# Patient Record
Sex: MALE | Race: WHITE | NOT HISPANIC OR LATINO | URBAN - METROPOLITAN AREA
[De-identification: names, ages, dates, MRNs, and addresses within clinical notes are randomized per-mention and may not be internally consistent; named-entity substitution may affect disease eponyms.]

---

## 2017-07-26 ENCOUNTER — OUTPATIENT (OUTPATIENT)
Dept: OUTPATIENT SERVICES | Facility: HOSPITAL | Age: 77
LOS: 1 days | Discharge: HOME | End: 2017-07-26
Payer: COMMERCIAL

## 2017-07-26 VITALS
DIASTOLIC BLOOD PRESSURE: 72 MMHG | HEIGHT: 67 IN | HEART RATE: 91 BPM | TEMPERATURE: 98 F | WEIGHT: 169.98 LBS | OXYGEN SATURATION: 100 % | SYSTOLIC BLOOD PRESSURE: 145 MMHG | RESPIRATION RATE: 14 BRPM

## 2017-07-26 DIAGNOSIS — Z96.7 PRESENCE OF OTHER BONE AND TENDON IMPLANTS: Chronic | ICD-10-CM

## 2017-07-26 DIAGNOSIS — Z90.89 ACQUIRED ABSENCE OF OTHER ORGANS: Chronic | ICD-10-CM

## 2017-07-26 DIAGNOSIS — Z98.890 OTHER SPECIFIED POSTPROCEDURAL STATES: Chronic | ICD-10-CM

## 2017-07-26 DIAGNOSIS — Z12.11 ENCOUNTER FOR SCREENING FOR MALIGNANT NEOPLASM OF COLON: ICD-10-CM

## 2017-07-26 DIAGNOSIS — Z98.49 CATARACT EXTRACTION STATUS, UNSPECIFIED EYE: Chronic | ICD-10-CM

## 2017-07-26 LAB
ANION GAP SERPL CALC-SCNC: 8 MMOL/L — SIGNIFICANT CHANGE UP (ref 5–17)
APTT BLD: 28.8 SEC — SIGNIFICANT CHANGE UP (ref 27.5–37.4)
BASOPHILS # BLD AUTO: 0.1 K/UL — SIGNIFICANT CHANGE UP (ref 0–0.2)
BASOPHILS NFR BLD AUTO: 1.1 % — SIGNIFICANT CHANGE UP (ref 0–2)
BUN SERPL-MCNC: 11 MG/DL — SIGNIFICANT CHANGE UP (ref 7–23)
CALCIUM SERPL-MCNC: 9.4 MG/DL — SIGNIFICANT CHANGE UP (ref 8.5–10.1)
CHLORIDE SERPL-SCNC: 103 MMOL/L — SIGNIFICANT CHANGE UP (ref 96–108)
CO2 SERPL-SCNC: 27 MMOL/L — SIGNIFICANT CHANGE UP (ref 22–31)
CREAT SERPL-MCNC: 0.8 MG/DL — SIGNIFICANT CHANGE UP (ref 0.5–1.3)
EOSINOPHIL # BLD AUTO: 0.2 K/UL — SIGNIFICANT CHANGE UP (ref 0–0.5)
EOSINOPHIL NFR BLD AUTO: 4.4 % — SIGNIFICANT CHANGE UP (ref 0–6)
GLUCOSE SERPL-MCNC: 114 MG/DL — HIGH (ref 70–99)
HCT VFR BLD CALC: 43.5 % — SIGNIFICANT CHANGE UP (ref 39–50)
HGB BLD-MCNC: 15 G/DL — SIGNIFICANT CHANGE UP (ref 13–17)
INR BLD: 1.18 RATIO — HIGH (ref 0.88–1.16)
LYMPHOCYTES # BLD AUTO: 1.6 K/UL — SIGNIFICANT CHANGE UP (ref 1–3.3)
LYMPHOCYTES # BLD AUTO: 28.1 % — SIGNIFICANT CHANGE UP (ref 13–44)
MCHC RBC-ENTMCNC: 34.4 PG — HIGH (ref 27–34)
MCHC RBC-ENTMCNC: 34.6 GM/DL — SIGNIFICANT CHANGE UP (ref 32–36)
MCV RBC AUTO: 99.4 FL — SIGNIFICANT CHANGE UP (ref 80–100)
MONOCYTES # BLD AUTO: 0.6 K/UL — SIGNIFICANT CHANGE UP (ref 0–0.9)
MONOCYTES NFR BLD AUTO: 10.8 % — SIGNIFICANT CHANGE UP (ref 2–14)
NEUTROPHILS # BLD AUTO: 3.1 K/UL — SIGNIFICANT CHANGE UP (ref 1.8–7.4)
NEUTROPHILS NFR BLD AUTO: 55.5 % — SIGNIFICANT CHANGE UP (ref 43–77)
PLATELET # BLD AUTO: 293 K/UL — SIGNIFICANT CHANGE UP (ref 150–400)
POTASSIUM SERPL-MCNC: 4.2 MMOL/L — SIGNIFICANT CHANGE UP (ref 3.5–5.3)
POTASSIUM SERPL-SCNC: 4.2 MMOL/L — SIGNIFICANT CHANGE UP (ref 3.5–5.3)
PROTHROM AB SERPL-ACNC: 12.8 SEC — HIGH (ref 9.8–12.7)
RBC # BLD: 4.37 M/UL — SIGNIFICANT CHANGE UP (ref 4.2–5.8)
RBC # FLD: 11.7 % — SIGNIFICANT CHANGE UP (ref 10.3–14.5)
SODIUM SERPL-SCNC: 138 MMOL/L — SIGNIFICANT CHANGE UP (ref 135–145)
WBC # BLD: 5.6 K/UL — SIGNIFICANT CHANGE UP (ref 3.8–10.5)
WBC # FLD AUTO: 5.6 K/UL — SIGNIFICANT CHANGE UP (ref 3.8–10.5)

## 2017-07-26 PROCEDURE — 93010 ELECTROCARDIOGRAM REPORT: CPT

## 2017-07-26 NOTE — H&P PST ADULT - PMH
Arrhythmia    Diverticulosis    Essential hypertension    Inverted T wave    Pure hypercholesterolemia

## 2017-07-26 NOTE — H&P PST ADULT - PSH
History of flexible sigmoidoscopy    History of right inguinal hernia repair    S/P cataract surgery  b/l 2010  S/P ORIF (open reduction internal fixation) fracture  left 1995  S/P tonsillectomy  as a child

## 2017-07-26 NOTE — H&P PST ADULT - ASSESSMENT
75 yo male scheduled for colonoscopy with Dr. Salvador on 8/2/17    1. Labs as per surgeon  2. EKG  3. Pre-procedure instructions as per surgeon

## 2017-07-26 NOTE — H&P PST ADULT - HISTORY OF PRESENT ILLNESS
75 yo male presents to [facility] prior to proposed procedure. Reports h/o diverticulosis.  Had sigmoidoscopy 3 years ago & is coming in for routine colonoscopy screening. Denies abddominal pain or blood in stool.  Now for said procedure. 75 yo male presents to [facility] prior to proposed procedure. Reports h/o diverticulosis.  Had sigmoidoscopy 3 years ago & is coming in for routine colonoscopy screening. Denies abdominal pain or blood in stool.  Now for said procedure.

## 2017-08-02 ENCOUNTER — RESULT REVIEW (OUTPATIENT)
Age: 77
End: 2017-08-02

## 2017-08-02 ENCOUNTER — OUTPATIENT (OUTPATIENT)
Dept: OUTPATIENT SERVICES | Facility: HOSPITAL | Age: 77
LOS: 1 days | Discharge: HOME | End: 2017-08-02
Payer: COMMERCIAL

## 2017-08-02 VITALS
WEIGHT: 169.98 LBS | OXYGEN SATURATION: 100 % | HEART RATE: 91 BPM | RESPIRATION RATE: 29 BRPM | HEIGHT: 67 IN | DIASTOLIC BLOOD PRESSURE: 87 MMHG | TEMPERATURE: 99 F | SYSTOLIC BLOOD PRESSURE: 168 MMHG

## 2017-08-02 DIAGNOSIS — Z98.890 OTHER SPECIFIED POSTPROCEDURAL STATES: Chronic | ICD-10-CM

## 2017-08-02 DIAGNOSIS — Z98.49 CATARACT EXTRACTION STATUS, UNSPECIFIED EYE: Chronic | ICD-10-CM

## 2017-08-02 DIAGNOSIS — Z96.7 PRESENCE OF OTHER BONE AND TENDON IMPLANTS: Chronic | ICD-10-CM

## 2017-08-02 DIAGNOSIS — Z90.89 ACQUIRED ABSENCE OF OTHER ORGANS: Chronic | ICD-10-CM

## 2017-08-02 PROCEDURE — 88305 TISSUE EXAM BY PATHOLOGIST: CPT | Mod: 26

## 2017-08-02 RX ORDER — SODIUM CHLORIDE 9 MG/ML
1000 INJECTION INTRAMUSCULAR; INTRAVENOUS; SUBCUTANEOUS
Qty: 0 | Refills: 0 | Status: DISCONTINUED | OUTPATIENT
Start: 2017-08-02 | End: 2017-08-17

## 2017-08-02 RX ADMIN — SODIUM CHLORIDE 75 MILLILITER(S): 9 INJECTION INTRAMUSCULAR; INTRAVENOUS; SUBCUTANEOUS at 08:37

## 2017-08-03 LAB — SURGICAL PATHOLOGY FINAL REPORT - CH: SIGNIFICANT CHANGE UP

## 2017-08-06 DIAGNOSIS — E78.5 HYPERLIPIDEMIA, UNSPECIFIED: ICD-10-CM

## 2017-08-06 DIAGNOSIS — Z87.891 PERSONAL HISTORY OF NICOTINE DEPENDENCE: ICD-10-CM

## 2017-08-06 DIAGNOSIS — Z12.11 ENCOUNTER FOR SCREENING FOR MALIGNANT NEOPLASM OF COLON: ICD-10-CM

## 2017-08-06 DIAGNOSIS — K57.30 DIVERTICULOSIS OF LARGE INTESTINE WITHOUT PERFORATION OR ABSCESS WITHOUT BLEEDING: ICD-10-CM

## 2017-08-06 DIAGNOSIS — I10 ESSENTIAL (PRIMARY) HYPERTENSION: ICD-10-CM

## 2017-08-06 DIAGNOSIS — K64.8 OTHER HEMORRHOIDS: ICD-10-CM

## 2017-08-06 DIAGNOSIS — D12.0 BENIGN NEOPLASM OF CECUM: ICD-10-CM

## 2019-11-19 ENCOUNTER — APPOINTMENT (OUTPATIENT)
Dept: ULTRASOUND IMAGING | Facility: CLINIC | Age: 79
End: 2019-11-19
Payer: MEDICARE

## 2019-11-19 ENCOUNTER — TRANSCRIPTION ENCOUNTER (OUTPATIENT)
Age: 79
End: 2019-11-19

## 2019-11-19 ENCOUNTER — OUTPATIENT (OUTPATIENT)
Dept: OUTPATIENT SERVICES | Facility: HOSPITAL | Age: 79
LOS: 1 days | End: 2019-11-19
Payer: MEDICARE

## 2019-11-19 DIAGNOSIS — Z98.890 OTHER SPECIFIED POSTPROCEDURAL STATES: Chronic | ICD-10-CM

## 2019-11-19 DIAGNOSIS — Z90.89 ACQUIRED ABSENCE OF OTHER ORGANS: Chronic | ICD-10-CM

## 2019-11-19 DIAGNOSIS — Z98.49 CATARACT EXTRACTION STATUS, UNSPECIFIED EYE: Chronic | ICD-10-CM

## 2019-11-19 DIAGNOSIS — Z00.8 ENCOUNTER FOR OTHER GENERAL EXAMINATION: ICD-10-CM

## 2019-11-19 DIAGNOSIS — Z96.7 PRESENCE OF OTHER BONE AND TENDON IMPLANTS: Chronic | ICD-10-CM

## 2019-11-19 PROBLEM — I49.9 CARDIAC ARRHYTHMIA, UNSPECIFIED: Chronic | Status: ACTIVE | Noted: 2017-07-26

## 2019-11-19 PROBLEM — I10 ESSENTIAL (PRIMARY) HYPERTENSION: Chronic | Status: ACTIVE | Noted: 2017-07-26

## 2019-11-19 PROBLEM — R94.31 ABNORMAL ELECTROCARDIOGRAM [ECG] [EKG]: Chronic | Status: ACTIVE | Noted: 2017-07-26

## 2019-11-19 PROBLEM — E78.00 PURE HYPERCHOLESTEROLEMIA, UNSPECIFIED: Chronic | Status: ACTIVE | Noted: 2017-07-26

## 2019-11-19 PROBLEM — K57.90 DIVERTICULOSIS OF INTESTINE, PART UNSPECIFIED, WITHOUT PERFORATION OR ABSCESS WITHOUT BLEEDING: Chronic | Status: ACTIVE | Noted: 2017-07-26

## 2019-11-19 PROCEDURE — 93880 EXTRACRANIAL BILAT STUDY: CPT

## 2019-11-19 PROCEDURE — 93880 EXTRACRANIAL BILAT STUDY: CPT | Mod: 26

## 2020-02-27 ENCOUNTER — OUTPATIENT (OUTPATIENT)
Dept: OUTPATIENT SERVICES | Facility: HOSPITAL | Age: 80
LOS: 1 days | End: 2020-02-27
Payer: MEDICARE

## 2020-02-27 ENCOUNTER — APPOINTMENT (OUTPATIENT)
Dept: RADIOLOGY | Facility: CLINIC | Age: 80
End: 2020-02-27

## 2020-02-27 DIAGNOSIS — Z98.890 OTHER SPECIFIED POSTPROCEDURAL STATES: Chronic | ICD-10-CM

## 2020-02-27 DIAGNOSIS — Z96.7 PRESENCE OF OTHER BONE AND TENDON IMPLANTS: Chronic | ICD-10-CM

## 2020-02-27 DIAGNOSIS — Z00.8 ENCOUNTER FOR OTHER GENERAL EXAMINATION: ICD-10-CM

## 2020-02-27 DIAGNOSIS — Z98.49 CATARACT EXTRACTION STATUS, UNSPECIFIED EYE: Chronic | ICD-10-CM

## 2020-02-27 DIAGNOSIS — Z90.89 ACQUIRED ABSENCE OF OTHER ORGANS: Chronic | ICD-10-CM

## 2020-02-27 PROCEDURE — 73030 X-RAY EXAM OF SHOULDER: CPT | Mod: 26,RT

## 2020-02-27 PROCEDURE — 73030 X-RAY EXAM OF SHOULDER: CPT

## 2020-03-17 ENCOUNTER — OUTPATIENT (OUTPATIENT)
Dept: OUTPATIENT SERVICES | Facility: HOSPITAL | Age: 80
LOS: 1 days | End: 2020-03-17
Payer: MEDICARE

## 2020-03-17 ENCOUNTER — APPOINTMENT (OUTPATIENT)
Dept: MRI IMAGING | Facility: CLINIC | Age: 80
End: 2020-03-17
Payer: MEDICARE

## 2020-03-17 DIAGNOSIS — Z98.49 CATARACT EXTRACTION STATUS, UNSPECIFIED EYE: Chronic | ICD-10-CM

## 2020-03-17 DIAGNOSIS — Z96.7 PRESENCE OF OTHER BONE AND TENDON IMPLANTS: Chronic | ICD-10-CM

## 2020-03-17 DIAGNOSIS — Z98.890 OTHER SPECIFIED POSTPROCEDURAL STATES: Chronic | ICD-10-CM

## 2020-03-17 DIAGNOSIS — Z00.8 ENCOUNTER FOR OTHER GENERAL EXAMINATION: ICD-10-CM

## 2020-03-17 DIAGNOSIS — Z90.89 ACQUIRED ABSENCE OF OTHER ORGANS: Chronic | ICD-10-CM

## 2020-03-17 PROCEDURE — 73221 MRI JOINT UPR EXTREM W/O DYE: CPT

## 2020-03-17 PROCEDURE — 73221 MRI JOINT UPR EXTREM W/O DYE: CPT | Mod: 26,RT

## 2020-03-23 ENCOUNTER — APPOINTMENT (OUTPATIENT)
Dept: ORTHOPEDIC SURGERY | Facility: CLINIC | Age: 80
End: 2020-03-23

## 2020-04-15 ENCOUNTER — APPOINTMENT (OUTPATIENT)
Dept: ORTHOPEDIC SURGERY | Facility: CLINIC | Age: 80
End: 2020-04-15
Payer: MEDICARE

## 2020-04-15 DIAGNOSIS — M25.511 PAIN IN RIGHT SHOULDER: ICD-10-CM

## 2020-04-15 DIAGNOSIS — M75.101 UNSPECIFIED ROTATOR CUFF TEAR OR RUPTURE OF RIGHT SHOULDER, NOT SPECIFIED AS TRAUMATIC: ICD-10-CM

## 2020-04-15 DIAGNOSIS — G89.29 PAIN IN RIGHT SHOULDER: ICD-10-CM

## 2020-04-15 PROCEDURE — 99204 OFFICE O/P NEW MOD 45 MIN: CPT | Mod: 25

## 2020-04-15 PROCEDURE — 20610 DRAIN/INJ JOINT/BURSA W/O US: CPT | Mod: RT

## 2020-04-15 NOTE — PROCEDURE
[de-identified] : Laterality: Right shoulder Glenohumeral Joint Injection:\par \par The risks and benefits of the injection were reviewed with the patient today in office and all their questions were answered.  The injection site was the posterolateral corner of the right shoulder.  Prior to giving the injection the injection site was prepped with Chloraprep and a sterile field was created.  Sterile technique was carried out throughout the procedure.  After verbal consent from the patient we went ahead and injected the Right shoulder today placed in the Glenohumeral Joint with 1 ml 40 mg Depo-Medrol, 5 ml 1% lidocaine and 4 ml of .50% Bupivacaine for a total of 10 ml with a 22 lucia 1.5" needle.  The medication was placed into the shoulder joint without complication.  Post injection the patient reported no pain and normal motion of the shoulder.  The patient denied numbness and tingling going down their arm.  There were no complications during the procedure.\par \par

## 2020-04-15 NOTE — PHYSICAL EXAM
[de-identified] : Laterality: Right shoulder\par \par General: Alert and oriented x3.  In no acute distress.  Pleasant in nature with a normal affect.  No apparent respiratory distress. \par Erythema, Warmth, Rubor: Negative\par Swelling: Negative\par \par ROM:\par FF: 150° degrees\par ER: 70° degrees\par Abduction: 120° degrees\par IR: Normal\par IR behind back: L5\par \par Special Test:\par Empty Can Sign: Positive\par [place]'s Sign: Positive\par Mederos/Neer Sign: Negative\par Speed's Sign: Positive\par Yergason's Sign: Negative\par Apprehension/Relocation: Negative\par Sulcus Sign: Negative\par Cross Arm Adduction/Pain over AC-J: Negative\par Belly Press/Lift Off Behind Back: Negative\par \par Neurovascularly intact motor and sensory\par  [de-identified] : MRI of the right shoulder taken on 3/17/2020 and reviewed with the patient: Full thickness full with retracted supraspinatus tear mild supraspinatus muscle atrophy.  Severe tendinosis with partial thickness interstitial tearing of the anterior fibers of infraspinatus. Partial tearing of the cranial fibers of subscapularis. Partial thickness tearing of the intra-articular biceps tendon. Mild glenohumeral arthrosis. Small moderate glenoid humeral joint effusion decompressing into the subacromial/subdeltoid bursa. Nonspecific moderate teres minor atrophy.

## 2020-04-15 NOTE — DISCUSSION/SUMMARY
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 Continue with home exercise program/stretching program he does shoulder motion intact.\par #2. Avoid heavy lifting overhead.\par #3. Meloxicam 15 mg prescribed. Use as directed with food for pain.\par #4. All of his questions were answered. Surgical intervention was discussed briefly. At this point in time we are going to continue with conservative management and injection that was given today should help him.\par #5. Followup in 2-3 months as needed.

## 2020-04-15 NOTE — HISTORY OF PRESENT ILLNESS
[de-identified] : Branden is a 79-year-old male who presents with chronic right shoulder pain. He does have MRI which does show pathology of the rotator cuff as well as arthritis. His pain scale in office is a 4/10. He does have pain with trying to use the shoulder as well as night pain. He denies numbness and tingling going down his right upper cavity. He has no other complaints in office.

## 2020-05-13 ENCOUNTER — APPOINTMENT (OUTPATIENT)
Dept: ORTHOPEDIC SURGERY | Facility: CLINIC | Age: 80
End: 2020-05-13
Payer: MEDICARE

## 2020-05-13 DIAGNOSIS — M12.811 OTHER SPECIFIC ARTHROPATHIES, NOT ELSEWHERE CLASSIFIED, RIGHT SHOULDER: ICD-10-CM

## 2020-05-13 PROCEDURE — 99212 OFFICE O/P EST SF 10 MIN: CPT

## 2020-05-13 NOTE — PHYSICAL EXAM
[de-identified] : Laterality: Right shoulder\par \par General: Alert and oriented x3.  In no acute distress.  Pleasant in nature with a normal affect.  No apparent respiratory distress. \par Erythema, Warmth, Rubor: Negative\par Swelling: Negative\par \par ROM:\par FF: 150° degrees\par ER: 70° degrees\par Abduction: 120° degrees\par IR: Normal\par IR behind back: L5\par \par Special Test:\par Empty Can Sign: Positive\par [place]'s Sign: Positive\par Mederos/Neer Sign: Negative\par Speed's Sign: Positive\par Yergason's Sign: Negative\par Apprehension/Relocation: Negative\par Sulcus Sign: Negative\par Cross Arm Adduction/Pain over AC-J: Negative\par Belly Press/Lift Off Behind Back: Negative\par \par Neurovascularly intact motor and sensory\par

## 2020-05-13 NOTE — HISTORY OF PRESENT ILLNESS
[de-identified] : 04/15/2020: Branden is a 79-year-old male who presents with chronic right shoulder pain. He does have MRI which does show pathology of the rotator cuff as well as arthritis. His pain scale in office is a 4/10. He does have pain with trying to use the shoulder as well as night pain. He denies numbness and tingling going down his right upper cavity. He has no other complaints in office.\par \par 5/13/20: patient returns today for followup of his right shoulder pain and weakness.He did have some improvement with the cortisone injection and currentlyhis pain is tolerable. His main complaint is activity related weakness and dysfunction of the armspecifically with internal rotation.

## 2020-05-13 NOTE — REASON FOR VISIT
[Follow-Up Visit] : a follow-up visit for [Initial Visit] : an initial visit for [FreeTextEntry2] : Chronic right shoulder pain

## 2020-05-13 NOTE — ASSESSMENT
[FreeTextEntry1] : 79-year-old male with right shoulderpain and weakness secondary to rotator cuff arthropathy.Overall he is functioning well and has minimal pain. I recommend continued physical therapy for focused strengthening exercises and activity modification. He will followup as needed for a cortisone injection if his pain increases.

## 2020-06-08 ENCOUNTER — RX RENEWAL (OUTPATIENT)
Age: 80
End: 2020-06-08

## 2020-12-08 ENCOUNTER — TRANSCRIPTION ENCOUNTER (OUTPATIENT)
Age: 80
End: 2020-12-08

## 2021-04-10 ENCOUNTER — TRANSCRIPTION ENCOUNTER (OUTPATIENT)
Age: 81
End: 2021-04-10

## 2021-05-19 ENCOUNTER — RX RENEWAL (OUTPATIENT)
Age: 81
End: 2021-05-19

## 2021-05-20 DIAGNOSIS — R94.31 ABNORMAL ELECTROCARDIOGRAM [ECG] [EKG]: ICD-10-CM

## 2021-05-20 DIAGNOSIS — Z86.19 PERSONAL HISTORY OF OTHER INFECTIOUS AND PARASITIC DISEASES: ICD-10-CM

## 2021-05-20 DIAGNOSIS — Z80.9 FAMILY HISTORY OF MALIGNANT NEOPLASM, UNSPECIFIED: ICD-10-CM

## 2021-05-20 DIAGNOSIS — Z12.11 ENCOUNTER FOR SCREENING FOR MALIGNANT NEOPLASM OF COLON: ICD-10-CM

## 2021-05-20 DIAGNOSIS — Z78.9 OTHER SPECIFIED HEALTH STATUS: ICD-10-CM

## 2021-05-20 DIAGNOSIS — I49.9 CARDIAC ARRHYTHMIA, UNSPECIFIED: ICD-10-CM

## 2021-05-24 ENCOUNTER — NON-APPOINTMENT (OUTPATIENT)
Age: 81
End: 2021-05-24

## 2021-05-24 DIAGNOSIS — F51.09 OTHER INSOMNIA NOT DUE TO A SUBSTANCE OR KNOWN PHYSIOLOGICAL CONDITION: ICD-10-CM

## 2021-05-24 DIAGNOSIS — R73.01 IMPAIRED FASTING GLUCOSE: ICD-10-CM

## 2021-05-24 DIAGNOSIS — Z86.79 PERSONAL HISTORY OF OTHER DISEASES OF THE CIRCULATORY SYSTEM: ICD-10-CM

## 2021-05-24 DIAGNOSIS — Z87.898 PERSONAL HISTORY OF OTHER SPECIFIED CONDITIONS: ICD-10-CM

## 2021-07-08 ENCOUNTER — APPOINTMENT (OUTPATIENT)
Dept: FAMILY MEDICINE | Facility: CLINIC | Age: 81
End: 2021-07-08

## 2021-08-16 ENCOUNTER — RX RENEWAL (OUTPATIENT)
Age: 81
End: 2021-08-16

## 2021-09-01 ENCOUNTER — RX RENEWAL (OUTPATIENT)
Age: 81
End: 2021-09-01

## 2021-09-24 ENCOUNTER — RX RENEWAL (OUTPATIENT)
Age: 81
End: 2021-09-24

## 2021-12-23 ENCOUNTER — RX RENEWAL (OUTPATIENT)
Age: 81
End: 2021-12-23

## 2022-01-07 PROBLEM — Z86.39 HISTORY OF HYPERLIPIDEMIA: Status: RESOLVED | Noted: 2021-05-20 | Resolved: 2022-01-07

## 2022-01-11 ENCOUNTER — APPOINTMENT (OUTPATIENT)
Dept: FAMILY MEDICINE | Facility: CLINIC | Age: 82
End: 2022-01-11
Payer: MEDICARE

## 2022-01-11 ENCOUNTER — NON-APPOINTMENT (OUTPATIENT)
Age: 82
End: 2022-01-11

## 2022-01-11 VITALS
HEIGHT: 67 IN | OXYGEN SATURATION: 97 % | HEART RATE: 73 BPM | SYSTOLIC BLOOD PRESSURE: 142 MMHG | WEIGHT: 177 LBS | BODY MASS INDEX: 27.78 KG/M2 | DIASTOLIC BLOOD PRESSURE: 70 MMHG | TEMPERATURE: 96 F

## 2022-01-11 DIAGNOSIS — Z23 ENCOUNTER FOR IMMUNIZATION: ICD-10-CM

## 2022-01-11 DIAGNOSIS — Z86.39 PERSONAL HISTORY OF OTHER ENDOCRINE, NUTRITIONAL AND METABOLIC DISEASE: ICD-10-CM

## 2022-01-11 PROCEDURE — G0008: CPT

## 2022-01-11 PROCEDURE — 90686 IIV4 VACC NO PRSV 0.5 ML IM: CPT

## 2022-01-11 PROCEDURE — G0438: CPT

## 2022-01-11 PROCEDURE — 93000 ELECTROCARDIOGRAM COMPLETE: CPT

## 2022-01-11 PROCEDURE — 36415 COLL VENOUS BLD VENIPUNCTURE: CPT

## 2022-01-11 NOTE — HISTORY OF PRESENT ILLNESS
[FreeTextEntry1] : OSKAR IBARRA is a 81 year old male here for a physical exam.  [de-identified] : His last PE was 1/2021\par His last tetanus shot was 3/2015\par He has had Prevnar and Pneumovax \par He has had Shingrix \par He has had the COVID vaccine\par His last dentist visit was less than 6 months ago \par His last eye doctor appointment was less than one year ago \par His last dermatologist visit was a few years ago\par His diet is healthy overall\par Exercise: walking and weights\par His last colonoscopy was 8/2/17

## 2022-01-11 NOTE — PLAN
[FreeTextEntry1] : Reviewed age-appropriate preventive screening tests with patient. He is due for a flu shot. Preventive testing is no longer indicated based upon his age. \par \par Discussed clean eating (e.g. Mediterranean style diet) and recommendations for regular exercise/staying as physically active as possible.\par \par Reviewed importance of good self care (e.g. meditation, yoga, adequate rest, regular exercise, magnesium, clean eating, etc.).

## 2022-01-11 NOTE — ASSESSMENT
[FreeTextEntry1] : OSKAR IBARRA is a 81 year old male here for a physical exam. He is here to follow up on the above listed conditions. He has been compliant with medications, diet, and exercise. He is here today to repeat his labs. He requests a HgbA1c done with his labs.\par \par His insomnia has been worse recently. He will try increasing his Trazodone dose from 150 to 200 mg. If this is effective he will call for a new prescription. \par \par Today's EKG is unchanged from previous.

## 2022-01-12 ENCOUNTER — NON-APPOINTMENT (OUTPATIENT)
Age: 82
End: 2022-01-12

## 2022-01-12 LAB
ALBUMIN SERPL ELPH-MCNC: 4.4 G/DL
ALP BLD-CCNC: 37 U/L
ALT SERPL-CCNC: 22 U/L
ANION GAP SERPL CALC-SCNC: 12 MMOL/L
AST SERPL-CCNC: 19 U/L
BILIRUB SERPL-MCNC: 0.7 MG/DL
BUN SERPL-MCNC: 16 MG/DL
CALCIUM SERPL-MCNC: 9.6 MG/DL
CHLORIDE SERPL-SCNC: 104 MMOL/L
CHOLEST SERPL-MCNC: 147 MG/DL
CO2 SERPL-SCNC: 23 MMOL/L
CREAT SERPL-MCNC: 0.89 MG/DL
ESTIMATED AVERAGE GLUCOSE: 108 MG/DL
GLUCOSE SERPL-MCNC: 99 MG/DL
HBA1C MFR BLD HPLC: 5.4 %
HDLC SERPL-MCNC: 67 MG/DL
LDLC SERPL CALC-MCNC: 68 MG/DL
NONHDLC SERPL-MCNC: 81 MG/DL
POTASSIUM SERPL-SCNC: 4.5 MMOL/L
PROT SERPL-MCNC: 6.7 G/DL
PSA SERPL-MCNC: 3.87 NG/ML
SODIUM SERPL-SCNC: 139 MMOL/L
TRIGL SERPL-MCNC: 66 MG/DL

## 2022-05-27 ENCOUNTER — RX RENEWAL (OUTPATIENT)
Age: 82
End: 2022-05-27

## 2022-08-30 ENCOUNTER — APPOINTMENT (OUTPATIENT)
Dept: DERMATOLOGY | Facility: CLINIC | Age: 82
End: 2022-08-30

## 2022-08-30 DIAGNOSIS — D22.9 MELANOCYTIC NEVI, UNSPECIFIED: ICD-10-CM

## 2022-08-30 PROCEDURE — 99203 OFFICE O/P NEW LOW 30 MIN: CPT | Mod: 25

## 2022-08-30 PROCEDURE — 17110 DESTRUCTION B9 LES UP TO 14: CPT

## 2022-08-30 RX ORDER — TRIAMCINOLONE ACETONIDE 1 MG/G
0.1 OINTMENT TOPICAL TWICE DAILY
Qty: 1 | Refills: 1 | Status: ACTIVE | COMMUNITY
Start: 2022-08-30 | End: 1900-01-01

## 2022-08-30 NOTE — ASSESSMENT
[FreeTextEntry1] : 1) benign findings as above- education\par \par 2) rash/ISK\par TAC BID PRn; SED\par The specified lesions were treated with liquid nitrogen cryotherapy.  Discussed risks including pain, blistering, crusting, discoloration, recurrence.\par

## 2022-08-30 NOTE — PHYSICAL EXAM
[FreeTextEntry3] : AAOx3, pleasant, NAD, no visual lymphadenopathy\par hair, scalp, face, nose, eyelids, ears, lips, oropharynx, neck, chest, abdomen, back, right arm, left arm, nails, and hands examined with all normal findings,\par pertinent findings include:\par \par red plaques on left lower leg x2\par + inflamed, waxy, keratotic papule x2

## 2022-08-30 NOTE — HISTORY OF PRESENT ILLNESS
[FreeTextEntry1] : rash and growths [de-identified] : 81 year old male here with rash and growths. itchy on left lower leg.\par

## 2023-01-10 ENCOUNTER — APPOINTMENT (OUTPATIENT)
Dept: FAMILY MEDICINE | Facility: CLINIC | Age: 83
End: 2023-01-10
Payer: MEDICARE

## 2023-01-10 ENCOUNTER — NON-APPOINTMENT (OUTPATIENT)
Age: 83
End: 2023-01-10

## 2023-01-10 ENCOUNTER — APPOINTMENT (OUTPATIENT)
Dept: DERMATOLOGY | Facility: CLINIC | Age: 83
End: 2023-01-10
Payer: MEDICARE

## 2023-01-10 VITALS — SYSTOLIC BLOOD PRESSURE: 138 MMHG | DIASTOLIC BLOOD PRESSURE: 78 MMHG

## 2023-01-10 VITALS
OXYGEN SATURATION: 98 % | BODY MASS INDEX: 27.78 KG/M2 | HEART RATE: 82 BPM | DIASTOLIC BLOOD PRESSURE: 84 MMHG | SYSTOLIC BLOOD PRESSURE: 162 MMHG | HEIGHT: 67 IN | TEMPERATURE: 97 F | WEIGHT: 177 LBS

## 2023-01-10 DIAGNOSIS — R21 RASH AND OTHER NONSPECIFIC SKIN ERUPTION: ICD-10-CM

## 2023-01-10 DIAGNOSIS — L81.4 OTHER MELANIN HYPERPIGMENTATION: ICD-10-CM

## 2023-01-10 DIAGNOSIS — L82.0 INFLAMED SEBORRHEIC KERATOSIS: ICD-10-CM

## 2023-01-10 PROCEDURE — 99213 OFFICE O/P EST LOW 20 MIN: CPT | Mod: 25

## 2023-01-10 PROCEDURE — 17110 DESTRUCTION B9 LES UP TO 14: CPT

## 2023-01-10 PROCEDURE — 93000 ELECTROCARDIOGRAM COMPLETE: CPT

## 2023-01-10 PROCEDURE — G0439: CPT

## 2023-01-10 RX ORDER — MELOXICAM 15 MG/1
15 TABLET ORAL DAILY
Qty: 30 | Refills: 1 | Status: DISCONTINUED | COMMUNITY
Start: 2020-04-15 | End: 2023-01-10

## 2023-01-10 RX ORDER — HYDROCHLOROTHIAZIDE 12.5 MG/1
12.5 CAPSULE ORAL
Qty: 90 | Refills: 0 | Status: DISCONTINUED | COMMUNITY
Start: 2019-11-19 | End: 2023-01-10

## 2023-01-10 NOTE — PLAN
[FreeTextEntry1] : Continue all medications as prescribed. Check labs as above. Will adjust any medications based upon lab results. \par \par Reviewed age-appropriate preventive screening tests with patient. \par \par Discussed clean eating (e.g. Mediterranean style diet) and recommendations for regular exercise/staying as physically active as possible. \par \par Reviewed importance of good self care (e.g. meditation, yoga, adequate rest, regular exercise, magnesium, clean eating, etc.). \par \par Follow up for next physical in one year.\par

## 2023-01-10 NOTE — PHYSICAL EXAM
[FreeTextEntry3] : AAOx3, pleasant, NAD, no visual lymphadenopathy\par hair, scalp, face, nose, eyelids, ears, lips, oropharynx, neck, chest, abdomen, back, right arm, left arm, nails, and hands examined with all normal findings,\par pertinent findings include:\par \par red plaques on left lower leg x1\par + inflamed, waxy, keratotic papule x1

## 2023-01-10 NOTE — HISTORY OF PRESENT ILLNESS
[FreeTextEntry1] : rash and growths [de-identified] : 81 year old male here with rash and growths. itchy on left lower leg. used TAC. some response to LN2 last visit.\par

## 2023-01-10 NOTE — HISTORY OF PRESENT ILLNESS
[FreeTextEntry1] : OSKAR IBARRA is a 82 year old male here for a physical exam.  [de-identified] : His last physical exam was last year \par \par Vaccines: \par Tetanus is up to date; last 3/2015\par Pneumococcal vaccination is up to date \par Shingrix is up to date \par COVID vaccine is up to date \par \par His last dentist visit was less than one year ago \par His last eye doctor appointment was less than one year ago \par His last dermatologist visit was less than one year ago (Dr. Simon)\par \par Colon cancer screening is no longer indicated for his age; last colonoscopy 8/2/2017\par \par His diet is healthy overall \par Exercise: walking and weights

## 2023-01-10 NOTE — HEALTH RISK ASSESSMENT
[No falls in past year] : Patient reported no falls in the past year [0] : 2) Feeling down, depressed, or hopeless: Not at all (0) [PHQ-2 Negative - No further assessment needed] : PHQ-2 Negative - No further assessment needed [[id number]] : 0

## 2023-01-10 NOTE — ASSESSMENT
[FreeTextEntry1] : OSKAR IBARRA is a 82 year old male here for a physical exam.\par \par Patient has a history of hypertension, hypercholesterolemia, and insomnia.\par \par His EKG is abnormal, showing nonspecific ST depression and negative T waves. He had a similar EKG last year but this year is slightly different. I recommended he follow up with cardiology for further evaluation.\par \par He has a rash on his left leg, diagnosed as an inflamed seborrheic keratosis by Dr. Simon. This is still bothering him so he plans to return to Dermatology.\par

## 2023-01-11 ENCOUNTER — TRANSCRIPTION ENCOUNTER (OUTPATIENT)
Age: 83
End: 2023-01-11

## 2023-01-11 LAB
ALBUMIN SERPL ELPH-MCNC: 4.4 G/DL
ALP BLD-CCNC: 43 U/L
ALT SERPL-CCNC: 25 U/L
ANION GAP SERPL CALC-SCNC: 12 MMOL/L
AST SERPL-CCNC: 18 U/L
BILIRUB SERPL-MCNC: 0.5 MG/DL
BUN SERPL-MCNC: 16 MG/DL
CALCIUM SERPL-MCNC: 9.5 MG/DL
CHLORIDE SERPL-SCNC: 104 MMOL/L
CHOLEST SERPL-MCNC: 146 MG/DL
CO2 SERPL-SCNC: 25 MMOL/L
CREAT SERPL-MCNC: 0.79 MG/DL
EGFR: 89 ML/MIN/1.73M2
GLUCOSE SERPL-MCNC: 102 MG/DL
HDLC SERPL-MCNC: 65 MG/DL
LDLC SERPL CALC-MCNC: 67 MG/DL
NONHDLC SERPL-MCNC: 81 MG/DL
POTASSIUM SERPL-SCNC: 4.7 MMOL/L
PROT SERPL-MCNC: 6.8 G/DL
PSA SERPL-MCNC: 3.53 NG/ML
SODIUM SERPL-SCNC: 140 MMOL/L
TRIGL SERPL-MCNC: 70 MG/DL

## 2023-01-25 ENCOUNTER — APPOINTMENT (OUTPATIENT)
Dept: CARDIOLOGY | Facility: CLINIC | Age: 83
End: 2023-01-25
Payer: MEDICARE

## 2023-01-25 ENCOUNTER — NON-APPOINTMENT (OUTPATIENT)
Age: 83
End: 2023-01-25

## 2023-01-25 VITALS
WEIGHT: 186 LBS | SYSTOLIC BLOOD PRESSURE: 138 MMHG | BODY MASS INDEX: 29.19 KG/M2 | HEART RATE: 75 BPM | OXYGEN SATURATION: 98 % | DIASTOLIC BLOOD PRESSURE: 68 MMHG | HEIGHT: 67 IN

## 2023-01-25 DIAGNOSIS — R94.31 ABNORMAL ELECTROCARDIOGRAM [ECG] [EKG]: ICD-10-CM

## 2023-01-25 PROCEDURE — 99205 OFFICE O/P NEW HI 60 MIN: CPT | Mod: 25

## 2023-01-25 PROCEDURE — 93000 ELECTROCARDIOGRAM COMPLETE: CPT

## 2023-02-03 ENCOUNTER — APPOINTMENT (OUTPATIENT)
Dept: CARDIOLOGY | Facility: CLINIC | Age: 83
End: 2023-02-03
Payer: MEDICARE

## 2023-02-03 PROCEDURE — 93306 TTE W/DOPPLER COMPLETE: CPT

## 2023-02-03 NOTE — ASSESSMENT
[FreeTextEntry1] : A/P:\par \par *abnormal ECG\par -exercise nuclear stress test\par -echo\par \par Return after testing to review results.

## 2023-02-03 NOTE — HISTORY OF PRESENT ILLNESS
[FreeTextEntry1] : Referred for T wave inversions on ECG\par slightly worse than prior ECG but pt has been\par told he had T wave inversion 40 yrs.\par Stress test 35-40 yrs ago not sure.\par Reports h/o arrhythmia, no treatment for this.\par \par no cardiac syptoms\par 1 ppd x 12 yrs, ETOH casual 1 0.5 bottle wine w/ dinner, no DUIs,\par  [facility]\par Exercise - treadmill for 20 minutes brisk walking, not jogs.\par no longer uses weights.\par \par *ECG NSR, rsR' pattern T wave invresions V3-V6

## 2023-02-09 ENCOUNTER — RESULT REVIEW (OUTPATIENT)
Age: 83
End: 2023-02-09

## 2023-02-09 ENCOUNTER — OUTPATIENT (OUTPATIENT)
Dept: OUTPATIENT SERVICES | Facility: HOSPITAL | Age: 83
LOS: 1 days | End: 2023-02-09
Payer: MEDICARE

## 2023-02-09 DIAGNOSIS — Z98.890 OTHER SPECIFIED POSTPROCEDURAL STATES: Chronic | ICD-10-CM

## 2023-02-09 DIAGNOSIS — Z98.49 CATARACT EXTRACTION STATUS, UNSPECIFIED EYE: Chronic | ICD-10-CM

## 2023-02-09 DIAGNOSIS — Z96.7 PRESENCE OF OTHER BONE AND TENDON IMPLANTS: Chronic | ICD-10-CM

## 2023-02-09 DIAGNOSIS — R94.31 ABNORMAL ELECTROCARDIOGRAM [ECG] [EKG]: ICD-10-CM

## 2023-02-09 DIAGNOSIS — Z90.89 ACQUIRED ABSENCE OF OTHER ORGANS: Chronic | ICD-10-CM

## 2023-02-09 PROCEDURE — 93017 CV STRESS TEST TRACING ONLY: CPT

## 2023-02-09 PROCEDURE — 78452 HT MUSCLE IMAGE SPECT MULT: CPT | Mod: 26

## 2023-02-09 PROCEDURE — A9500: CPT

## 2023-02-09 PROCEDURE — 93016 CV STRESS TEST SUPVJ ONLY: CPT

## 2023-02-09 PROCEDURE — 93018 CV STRESS TEST I&R ONLY: CPT

## 2023-02-09 PROCEDURE — 78452 HT MUSCLE IMAGE SPECT MULT: CPT

## 2023-02-09 RX ORDER — LISINOPRIL 2.5 MG/1
1 TABLET ORAL
Qty: 0 | Refills: 0 | DISCHARGE

## 2023-02-09 NOTE — CHART NOTE - NSCHARTNOTEFT_GEN_A_CORE
NUCLEAR EXERCISE STRESS TEST REPORT    OSKAR IBARRA 82yM  MRN: [id number]  : [birth year]  Admit: 23  EKG: baseline EKG with TWI in inferolateral leads.  Exercise stress test completed as pt became fatigued and achieved  target HR during stage 4. Radiopharmeceutical injected by technologist  A 12 lead ECG was recorded & BP was recorded throughout the test.    CONCLUSION:  1 mm vertical depression in lead II and V6 in recovery   See myocardial perfusion scan report.

## 2023-02-10 DIAGNOSIS — R94.31 ABNORMAL ELECTROCARDIOGRAM [ECG] [EKG]: ICD-10-CM

## 2023-12-28 DIAGNOSIS — Z87.891 PERSONAL HISTORY OF NICOTINE DEPENDENCE: ICD-10-CM

## 2023-12-29 ENCOUNTER — APPOINTMENT (OUTPATIENT)
Dept: FAMILY MEDICINE | Facility: CLINIC | Age: 83
End: 2023-12-29
Payer: MEDICARE

## 2023-12-29 VITALS
DIASTOLIC BLOOD PRESSURE: 70 MMHG | OXYGEN SATURATION: 96 % | WEIGHT: 185 LBS | TEMPERATURE: 97 F | SYSTOLIC BLOOD PRESSURE: 138 MMHG | BODY MASS INDEX: 29.03 KG/M2 | HEART RATE: 88 BPM | HEIGHT: 67 IN

## 2023-12-29 DIAGNOSIS — J06.9 ACUTE UPPER RESPIRATORY INFECTION, UNSPECIFIED: ICD-10-CM

## 2023-12-29 PROCEDURE — 99213 OFFICE O/P EST LOW 20 MIN: CPT

## 2023-12-29 NOTE — PLAN
[FreeTextEntry1] : The following OTC medications are recommended to treat URI/cold symptoms. Patient may use any or all of the following, as clinically indicated by symptoms, as long as not contraindicated by allergy or other medical conditions.  Neti-pot/nasal saline spray-  tsp salt dissolved in warm water in Neti Pot [place] (pelargonium)- treats cough/cold symptoms Sambucol (black elderberry syrup)- boosts immune system Nasal Steroid Spray (e.g. Fluticasone)- decreases nasal/sinus congestion; okay to use for weeks or months at a time Nasal decongestant spray (e.g. Afrin)- decreases nasal/sinus congestion but can only use short term (ie a few days at most) or else have side effects Decongestant (e.g. Sudafed)- decreases nasal/sinus congestion Antihistamine (e.g. Benadryl, Claritin etc.)- decreases runny nose and post nasal drip- NOT for use during acute sinus infections Mucolytic (e.g. Mucinex)- thins mucous to help it drain more easily Tylenol- for fever, pain, headache, aches etc. Ibuprofen (e.g. Advil, Motrin) or Naproxen (e.g. Aleve)- for fever, pain, headache, aches Fluids- thins mucous, keeps you hydrated Zinc- reduces duration and severity of cold symptoms Honey- 1 tsp. to 1 Tbsp. straight off spoon or mixed with tea or hot water- soothes sore throat and quiets cough  BP goal is <=135/85 on average on home checks. Advised to let us know if BPs are above goal on home checks.   Lifestyle measures to optimize BP reviewed, including regular exercise, adequate sleep, Mediterranean or DASH style clean eating, mindfulness/meditation, magnesium 100-400 mg supplementation, avoid NSAIDS, stress management techniques etc.

## 2023-12-29 NOTE — HEALTH RISK ASSESSMENT
[No falls in past year] : Patient reported no falls in the past year [0] : 2) Feeling down, depressed, or hopeless: Not at all (0) [PHQ-2 Negative - No further assessment needed] : PHQ-2 Negative - No further assessment needed [Former] : Former [5-9] : 5-9 [> 15 Years] : > 15 Years [[id number]] : 0

## 2023-12-29 NOTE — ASSESSMENT
[FreeTextEntry1] : He has a history of hypertension, hypercholesterolemia, and insomnia. He presents with URI symptoms. His exam is unremarkable. He admits that he is feeling better today. His blood pressure and pulse are still borderline high so he is not 100% better but he does not need any medication for his URI.  He is already scheduled for his annual physical in 2 weeks.

## 2023-12-29 NOTE — HISTORY OF PRESENT ILLNESS
[FreeTextEntry8] : Patient presents with elevated blood pressure and chest congestion. He also complains of a cough which started last weekend. His blood pressure has been in the 140s-160s systolic. His pulse has been elevated as well. It was in the 90s-100s but it back to the 70s. He had a flu shot, RSV, and COVID booster. He tested negative for COVID 3 days ago. He has been exposed to COVID from family members. He denies a fever. He felt worse yesterday and feels a little better today. He is taking Robitussin for his cough.

## 2024-01-05 ENCOUNTER — RESULT CHARGE (OUTPATIENT)
Age: 84
End: 2024-01-05

## 2024-01-11 ENCOUNTER — APPOINTMENT (OUTPATIENT)
Dept: FAMILY MEDICINE | Facility: CLINIC | Age: 84
End: 2024-01-11
Payer: MEDICARE

## 2024-01-11 ENCOUNTER — NON-APPOINTMENT (OUTPATIENT)
Age: 84
End: 2024-01-11

## 2024-01-11 VITALS
HEIGHT: 67 IN | TEMPERATURE: 97 F | OXYGEN SATURATION: 95 % | HEART RATE: 79 BPM | WEIGHT: 186 LBS | SYSTOLIC BLOOD PRESSURE: 128 MMHG | DIASTOLIC BLOOD PRESSURE: 72 MMHG | BODY MASS INDEX: 29.19 KG/M2

## 2024-01-11 DIAGNOSIS — Z00.00 ENCOUNTER FOR GENERAL ADULT MEDICAL EXAMINATION W/OUT ABNORMAL FINDINGS: ICD-10-CM

## 2024-01-11 PROCEDURE — 36415 COLL VENOUS BLD VENIPUNCTURE: CPT

## 2024-01-11 PROCEDURE — G0439: CPT

## 2024-01-11 PROCEDURE — 93000 ELECTROCARDIOGRAM COMPLETE: CPT

## 2024-01-11 RX ORDER — TRAZODONE HYDROCHLORIDE 100 MG/1
100 TABLET ORAL
Qty: 180 | Refills: 3 | Status: ACTIVE | COMMUNITY
Start: 2020-02-27 | End: 1900-01-01

## 2024-01-11 RX ORDER — LISINOPRIL 40 MG/1
40 TABLET ORAL
Qty: 90 | Refills: 3 | Status: ACTIVE | COMMUNITY
Start: 2019-10-11 | End: 1900-01-01

## 2024-01-11 RX ORDER — ATORVASTATIN CALCIUM 20 MG/1
20 TABLET, FILM COATED ORAL
Qty: 90 | Refills: 3 | Status: ACTIVE | COMMUNITY
Start: 2019-07-19 | End: 1900-01-01

## 2024-01-11 RX ORDER — METOPROLOL SUCCINATE 25 MG/1
25 TABLET, EXTENDED RELEASE ORAL
Qty: 90 | Refills: 3 | Status: ACTIVE | COMMUNITY
Start: 2020-02-27 | End: 1900-01-01

## 2024-01-11 NOTE — HISTORY OF PRESENT ILLNESS
[FreeTextEntry1] : OSKAR IBARRA is a 83 year old male here for a physical exam. [de-identified] : His last physical exam was last year  Vaccines: Tetanus is up to date; last 3/2015 Pneumococcal vaccination is up to date Shingrix is up to date  His last dentist visit was less than one year ago His last eye doctor appointment was less than one year ago His last dermatologist visit was less than one year ago, Dr. Simon  Colon cancer screening is no longer indicated for his age; last colonoscopy 8/2/2017  His diet is healthy overall Exercise: walking

## 2024-01-11 NOTE — PHYSICAL EXAM
[No Acute Distress] : no acute distress [Well Nourished] : well nourished [Well Developed] : well developed [Well-Appearing] : well-appearing [Normal Sclera/Conjunctiva] : normal sclera/conjunctiva [PERRL] : pupils equal round and reactive to light [EOMI] : extraocular movements intact [Normal Outer Ear/Nose] : the outer ears and nose were normal in appearance [Normal Oropharynx] : the oropharynx was normal [No JVD] : no jugular venous distention [No Lymphadenopathy] : no lymphadenopathy [Supple] : supple [Thyroid Normal, No Nodules] : the thyroid was normal and there were no nodules present [No Respiratory Distress] : no respiratory distress  [No Accessory Muscle Use] : no accessory muscle use [Clear to Auscultation] : lungs were clear to auscultation bilaterally [Normal Rate] : normal rate  [Regular Rhythm] : with a regular rhythm [Normal S1, S2] : normal S1 and S2 [No Murmur] : no murmur heard [No Carotid Bruits] : no carotid bruits [No Abdominal Bruit] : a ~M bruit was not heard ~T in the abdomen [No Varicosities] : no varicosities [Pedal Pulses Present] : the pedal pulses are present [No Edema] : there was no peripheral edema [No Palpable Aorta] : no palpable aorta [No Extremity Clubbing/Cyanosis] : no extremity clubbing/cyanosis [Soft] : abdomen soft [Non Tender] : non-tender [Non-distended] : non-distended [No Masses] : no abdominal mass palpated [No HSM] : no HSM [Normal Bowel Sounds] : normal bowel sounds [No CVA Tenderness] : no CVA  tenderness [No Spinal Tenderness] : no spinal tenderness [Grossly Normal Strength/Tone] : grossly normal strength/tone [No Joint Swelling] : no joint swelling [No Rash] : no rash [Coordination Grossly Intact] : coordination grossly intact [No Focal Deficits] : no focal deficits [Normal Gait] : normal gait [Deep Tendon Reflexes (DTR)] : deep tendon reflexes were 2+ and symmetric [Normal Affect] : the affect was normal [Normal Insight/Judgement] : insight and judgment were intact

## 2024-01-11 NOTE — ASSESSMENT
[FreeTextEntry1] : OSKAR IBARRA is a 83 year old male here for a physical exam.  He has a history of hypertension, hypercholesterolemia, and insomnia.  He had a visit last week for elevated blood pressure and URI symptoms. His blood pressure was borderline high but no changes were made to his medication regimen. His BP is improved today.  His EKG is abnormal but unchanged from previous. He went to cardiology last year and had an Echo and a nuclear stress test which were both normal.  He is taking Trazodone for insomnia but would like to try Ambien again. He states he would not use the regularly and requests only 30 pills. He understands the risks of this medication in the elderly.

## 2024-01-11 NOTE — PLAN
[FreeTextEntry1] : Continue all medications as prescribed. Check labs as above. Will adjust any medications based upon lab results.  Reviewed age-appropriate preventive screening tests with patient.  Discussed clean eating (eg Mediterranean style eating plan) and regular exercise/staying as physically active as possible.  Include balance exercises and strength training and core strengthening exercises for bone health and to decrease risk for falls.  Reviewed importance of good self care (e.g. meditation, yoga, adequate rest, regular exercise, magnesium, clean eating, etc.).  Follow up for next physical in one year.

## 2024-01-11 NOTE — HEALTH RISK ASSESSMENT
[No falls in past year] : Patient reported no falls in the past year [0] : 2) Feeling down, depressed, or hopeless: Not at all (0) [PHQ-2 Negative - No further assessment needed] : PHQ-2 Negative - No further assessment needed [Former] : Former [5-9] : 5-9 [> 15 Years] : > 15 Years [[id number]] : 0

## 2024-01-12 ENCOUNTER — TRANSCRIPTION ENCOUNTER (OUTPATIENT)
Age: 84
End: 2024-01-12

## 2024-01-12 LAB
ALBUMIN SERPL ELPH-MCNC: 4.3 G/DL
ALP BLD-CCNC: 64 U/L
ALT SERPL-CCNC: 27 U/L
ANION GAP SERPL CALC-SCNC: 15 MMOL/L
AST SERPL-CCNC: 26 U/L
BASOPHILS # BLD AUTO: 0.04 K/UL
BASOPHILS NFR BLD AUTO: 0.6 %
BILIRUB SERPL-MCNC: 0.3 MG/DL
BUN SERPL-MCNC: 33 MG/DL
CALCIUM SERPL-MCNC: 8.7 MG/DL
CHLORIDE SERPL-SCNC: 103 MMOL/L
CHOLEST SERPL-MCNC: 157 MG/DL
CO2 SERPL-SCNC: 21 MMOL/L
CREAT SERPL-MCNC: 1.28 MG/DL
EGFR: 56 ML/MIN/1.73M2
EOSINOPHIL # BLD AUTO: 0.55 K/UL
EOSINOPHIL NFR BLD AUTO: 8.8 %
GLUCOSE SERPL-MCNC: 83 MG/DL
HCT VFR BLD CALC: 40.3 %
HDLC SERPL-MCNC: 82 MG/DL
HGB BLD-MCNC: 13.3 G/DL
IMM GRANULOCYTES NFR BLD AUTO: 0.3 %
LDLC SERPL CALC-MCNC: 63 MG/DL
LYMPHOCYTES # BLD AUTO: 2.53 K/UL
LYMPHOCYTES NFR BLD AUTO: 40.6 %
MAN DIFF?: NORMAL
MCHC RBC-ENTMCNC: 33 GM/DL
MCHC RBC-ENTMCNC: 35.8 PG
MCV RBC AUTO: 108.6 FL
MONOCYTES # BLD AUTO: 0.6 K/UL
MONOCYTES NFR BLD AUTO: 9.6 %
NEUTROPHILS # BLD AUTO: 2.49 K/UL
NEUTROPHILS NFR BLD AUTO: 40.1 %
NONHDLC SERPL-MCNC: 75 MG/DL
PLATELET # BLD AUTO: 256 K/UL
POTASSIUM SERPL-SCNC: 4.6 MMOL/L
PROT SERPL-MCNC: 6.6 G/DL
PSA SERPL-MCNC: 4.34 NG/ML
RBC # BLD: 3.71 M/UL
RBC # FLD: 13.6 %
SODIUM SERPL-SCNC: 139 MMOL/L
TRIGL SERPL-MCNC: 59 MG/DL
WBC # FLD AUTO: 6.23 K/UL

## 2024-01-15 ENCOUNTER — TRANSCRIPTION ENCOUNTER (OUTPATIENT)
Age: 84
End: 2024-01-15

## 2024-02-07 ENCOUNTER — INPATIENT (INPATIENT)
Facility: HOSPITAL | Age: 84
LOS: 13 days | Discharge: NURSING, REHAB OR LONG-TERM CARE | DRG: 205 | End: 2024-02-21
Attending: STUDENT IN AN ORGANIZED HEALTH CARE EDUCATION/TRAINING PROGRAM | Admitting: INTERNAL MEDICINE
Payer: MEDICARE

## 2024-02-07 VITALS
TEMPERATURE: 98 F | HEART RATE: 86 BPM | DIASTOLIC BLOOD PRESSURE: 76 MMHG | OXYGEN SATURATION: 96 % | SYSTOLIC BLOOD PRESSURE: 161 MMHG | RESPIRATION RATE: 20 BRPM
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
## 2024-02-07 DIAGNOSIS — Z98.49 CATARACT EXTRACTION STATUS, UNSPECIFIED EYE: Chronic | ICD-10-CM

## 2024-02-07 DIAGNOSIS — J18.9 PNEUMONIA, UNSPECIFIED ORGANISM: ICD-10-CM
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
## 2024-02-07 DIAGNOSIS — J20.9 ACUTE BRONCHITIS, UNSPECIFIED: ICD-10-CM

## 2024-02-07 DIAGNOSIS — Z96.7 PRESENCE OF OTHER BONE AND TENDON IMPLANTS: Chronic | ICD-10-CM

## 2024-02-07 DIAGNOSIS — Z90.89 ACQUIRED ABSENCE OF OTHER ORGANS: Chronic | ICD-10-CM

## 2024-02-07 DIAGNOSIS — F10.131 ALCOHOL ABUSE WITH WITHDRAWAL DELIRIUM: ICD-10-CM

## 2024-02-07 DIAGNOSIS — I10 ESSENTIAL (PRIMARY) HYPERTENSION: ICD-10-CM

## 2024-02-07 DIAGNOSIS — G93.41 METABOLIC ENCEPHALOPATHY: ICD-10-CM

## 2024-02-07 DIAGNOSIS — S22.32XA FRACTURE OF ONE RIB, LEFT SIDE, INITIAL ENCOUNTER FOR CLOSED FRACTURE: ICD-10-CM

## 2024-02-07 DIAGNOSIS — J96.01 ACUTE RESPIRATORY FAILURE WITH HYPOXIA: ICD-10-CM

## 2024-02-07 DIAGNOSIS — Z98.890 OTHER SPECIFIED POSTPROCEDURAL STATES: Chronic | ICD-10-CM

## 2024-02-07 DIAGNOSIS — E78.5 HYPERLIPIDEMIA, UNSPECIFIED: ICD-10-CM

## 2024-02-07 DIAGNOSIS — J81.0 ACUTE PULMONARY EDEMA: ICD-10-CM

## 2024-02-07 LAB
ALBUMIN SERPL ELPH-MCNC: 3.7 G/DL — SIGNIFICANT CHANGE UP (ref 3.3–5)
ALP SERPL-CCNC: 64 U/L — SIGNIFICANT CHANGE UP (ref 40–120)
ALT FLD-CCNC: 77 U/L — SIGNIFICANT CHANGE UP (ref 12–78)
ANION GAP SERPL CALC-SCNC: 8 MMOL/L — SIGNIFICANT CHANGE UP (ref 5–17)
AST SERPL-CCNC: 47 U/L — HIGH (ref 15–37)
BASOPHILS # BLD AUTO: 0.04 K/UL — SIGNIFICANT CHANGE UP (ref 0–0.2)
BASOPHILS NFR BLD AUTO: 0.5 % — SIGNIFICANT CHANGE UP (ref 0–2)
BILIRUB SERPL-MCNC: 0.5 MG/DL — SIGNIFICANT CHANGE UP (ref 0.2–1.2)
BUN SERPL-MCNC: 35 MG/DL — HIGH (ref 7–23)
CALCIUM SERPL-MCNC: 8.7 MG/DL — SIGNIFICANT CHANGE UP (ref 8.5–10.1)
CHLORIDE SERPL-SCNC: 108 MMOL/L — SIGNIFICANT CHANGE UP (ref 96–108)
CO2 SERPL-SCNC: 23 MMOL/L — SIGNIFICANT CHANGE UP (ref 22–31)
CREAT SERPL-MCNC: 1.05 MG/DL — SIGNIFICANT CHANGE UP (ref 0.5–1.3)
EGFR: 71 ML/MIN/1.73M2 — SIGNIFICANT CHANGE UP
EOSINOPHIL # BLD AUTO: 0.1 K/UL — SIGNIFICANT CHANGE UP (ref 0–0.5)
EOSINOPHIL NFR BLD AUTO: 1.2 % — SIGNIFICANT CHANGE UP (ref 0–6)
FLUAV AG NPH QL: SIGNIFICANT CHANGE UP
FLUBV AG NPH QL: SIGNIFICANT CHANGE UP
GLUCOSE SERPL-MCNC: 107 MG/DL — HIGH (ref 70–99)
HCT VFR BLD CALC: 36 % — LOW (ref 39–50)
HGB BLD-MCNC: 12.6 G/DL — LOW (ref 13–17)
IMM GRANULOCYTES NFR BLD AUTO: 0.6 % — SIGNIFICANT CHANGE UP (ref 0–0.9)
LYMPHOCYTES # BLD AUTO: 1.05 K/UL — SIGNIFICANT CHANGE UP (ref 1–3.3)
LYMPHOCYTES # BLD AUTO: 13 % — SIGNIFICANT CHANGE UP (ref 13–44)
MACROCYTES BLD QL: SLIGHT — SIGNIFICANT CHANGE UP
MANUAL SMEAR VERIFICATION: SIGNIFICANT CHANGE UP
MCHC RBC-ENTMCNC: 35 GM/DL — SIGNIFICANT CHANGE UP (ref 32–36)
MCHC RBC-ENTMCNC: 37 PG — HIGH (ref 27–34)
MCV RBC AUTO: 105.6 FL — HIGH (ref 80–100)
MONOCYTES # BLD AUTO: 0.74 K/UL — SIGNIFICANT CHANGE UP (ref 0–0.9)
MONOCYTES NFR BLD AUTO: 9.2 % — SIGNIFICANT CHANGE UP (ref 2–14)
NEUTROPHILS # BLD AUTO: 6.1 K/UL — SIGNIFICANT CHANGE UP (ref 1.8–7.4)
NEUTROPHILS NFR BLD AUTO: 75.5 % — SIGNIFICANT CHANGE UP (ref 43–77)
PLAT MORPH BLD: NORMAL — SIGNIFICANT CHANGE UP
PLATELET # BLD AUTO: 193 K/UL — SIGNIFICANT CHANGE UP (ref 150–400)
POTASSIUM SERPL-MCNC: 4.5 MMOL/L — SIGNIFICANT CHANGE UP (ref 3.5–5.3)
POTASSIUM SERPL-SCNC: 4.5 MMOL/L — SIGNIFICANT CHANGE UP (ref 3.5–5.3)
PROT SERPL-MCNC: 7.4 GM/DL — SIGNIFICANT CHANGE UP (ref 6–8.3)
RBC # BLD: 3.41 M/UL — LOW (ref 4.2–5.8)
RBC # FLD: 13.6 % — SIGNIFICANT CHANGE UP (ref 10.3–14.5)
RBC BLD AUTO: ABNORMAL
RSV RNA NPH QL NAA+NON-PROBE: SIGNIFICANT CHANGE UP
SARS-COV-2 RNA SPEC QL NAA+PROBE: SIGNIFICANT CHANGE UP
SODIUM SERPL-SCNC: 139 MMOL/L — SIGNIFICANT CHANGE UP (ref 135–145)
TROPONIN I, HIGH SENSITIVITY RESULT: 7.36 NG/L — SIGNIFICANT CHANGE UP
WBC # BLD: 8.08 K/UL — SIGNIFICANT CHANGE UP (ref 3.8–10.5)
WBC # FLD AUTO: 8.08 K/UL — SIGNIFICANT CHANGE UP (ref 3.8–10.5)

## 2024-02-07 PROCEDURE — 93010 ELECTROCARDIOGRAM REPORT: CPT

## 2024-02-07 PROCEDURE — 71045 X-RAY EXAM CHEST 1 VIEW: CPT | Mod: 26

## 2024-02-07 PROCEDURE — 70450 CT HEAD/BRAIN W/O DYE: CPT | Mod: 26,MG

## 2024-02-07 PROCEDURE — 74177 CT ABD & PELVIS W/CONTRAST: CPT | Mod: 26,MG

## 2024-02-07 PROCEDURE — G1004: CPT

## 2024-02-07 PROCEDURE — 99285 EMERGENCY DEPT VISIT HI MDM: CPT

## 2024-02-07 PROCEDURE — 72125 CT NECK SPINE W/O DYE: CPT | Mod: 26,MG

## 2024-02-07 PROCEDURE — 71260 CT THORAX DX C+: CPT | Mod: 26,MG

## 2024-02-07 RX ORDER — SODIUM CHLORIDE 9 MG/ML
500 INJECTION INTRAMUSCULAR; INTRAVENOUS; SUBCUTANEOUS ONCE
Refills: 0 | Status: COMPLETED | OUTPATIENT
Start: 2024-02-07 | End: 2024-02-07

## 2024-02-07 RX ADMIN — Medication 100 MILLIGRAM(S): at 22:34

## 2024-02-07 RX ADMIN — SODIUM CHLORIDE 500 MILLILITER(S): 9 INJECTION INTRAMUSCULAR; INTRAVENOUS; SUBCUTANEOUS at 23:07

## 2024-02-07 NOTE — ED ADULT NURSE REASSESSMENT NOTE - NS ED NURSE REASSESS COMMENT FT1
Pt coughing and O2 sat on room air to 85%. Pt placed on 2L nasal cannula, respirations returned to even and unlabored, O2 sat 95%, in NAD.

## 2024-02-07 NOTE — ED PROVIDER NOTE - NSICDXFAMILYHX_GEN_ALL_CORE_FT
FAMILY HISTORY:  Mother  Still living? No  Family history of cancer in mother, Age at diagnosis: Age Unknown

## 2024-02-07 NOTE — ED PROVIDER NOTE - ENMT, MLM
Airway patent, Nasal mucosa clear. Mouth with fairly moist mucosa. Throat has no vesicles, no oropharyngeal exudates and uvula is midline. No battles, no clinical evidence of facial injury

## 2024-02-07 NOTE — ED PROVIDER NOTE - CONSTITUTIONAL, MLM
normal... Older white M, ill appearing, awake, alert, oriented to person, place, time/situation and in mild respiratory distress, disheveled

## 2024-02-07 NOTE — ED ADULT NURSE NOTE - NSFALLHARMRISKINTERV_ED_ALL_ED
Assistance OOB with selected safe patient handling equipment if applicable/Communicate risk of Fall with Harm to all staff, patient, and family/Monitor gait and stability/Provide patient with walking aids/Provide visual cue: red socks, yellow wristband, yellow gown, etc/Reinforce activity limits and safety measures with patient and family/Bed in lowest position, wheels locked, appropriate side rails in place/Call bell, personal items and telephone in reach/Instruct patient to call for assistance before getting out of bed/chair/stretcher/Non-slip footwear applied when patient is off stretcher/[place] to call system/Physically safe environment - no spills, clutter or unnecessary equipment/Purposeful Proactive Rounding/Room/bathroom lighting operational, light cord in reach

## 2024-02-07 NOTE — ED PROVIDER NOTE - NSICDXPASTMEDICALHX_GEN_ALL_CORE_FT
PAST MEDICAL HISTORY:  Arrhythmia     Diverticulosis     Essential hypertension     Inverted T wave     Pure hypercholesterolemia

## 2024-02-07 NOTE — ED PROVIDER NOTE - NEUROLOGICAL, MLM
Alert and oriented, no focal deficits, no motor or sensory deficits. Alert and oriented, no focal deficits, no motor or sensory deficits.  CNs intact, normal speech, no tremors or other evidence of acute EtOH withdrawal

## 2024-02-07 NOTE — ED PROVIDER NOTE - MUSCULOSKELETAL, MLM
Spine appears normal, range of motion is not limited, no muscle or joint tenderness, + tender lateral aspect L mid to lower ribs Spine appears normal, range of motion is not limited, no muscle or joint tenderness, + tender lateral aspect L mid to lower ribs w/o bruising nor chest wall deformity

## 2024-02-07 NOTE — ED PROVIDER NOTE - GASTROINTESTINAL, MLM
Abdomen soft, no obvious tenderness, no guarding. BS hypoactive, normal pitch, protuberant, questionable distended

## 2024-02-07 NOTE — ED ADULT NURSE NOTE - OBJECTIVE STATEMENT
83y male, A&Ox3, ambulatory w/ walker from home presents to ED c/o productive cough, DOMINGO, and b/l rib pain for 1x month. PMH HTN. Pt reports cough as first symptom. Pt reports colorless sputum, and decreased PO intake as of today. Denies sick contacts, HA, vomiting, diarrhea, or urinary symptoms. At rest pts respirations are even and unlabored, O2 sat 95% on room air. 83y male, A&Ox3, ambulatory w/ walker from home presents to ED c/o productive cough, DOMINGO, and b/l rib pain for 1x month. PMH HTN. Pt reports cough as first symptom. Pt reports colorless sputum, and decreased PO intake as of today. Pt reports when he coughs he has b/l rib pain, at rest there is no pain. Denies sick contacts, HA, vomiting, diarrhea, or urinary symptoms. At rest pts respirations are even and unlabored, O2 sat 94% on room air, in NAD. 83y male, A&Ox3, ambulatory w/ walker from home presents to ED c/o productive cough, DOMINGO, and b/l rib pain for 1x month. PMH HTN. Pt reports cough as first symptom. Pt reports colorless sputum, and decreased PO intake as of today. Pt reports when he coughs he has b/l rib pain, at rest there is no pain. Denies sick contacts, HA, vomiting, diarrhea, or urinary symptoms. EKG complete, pt placed in room on cardiac monitor in normal sinus rhythm. At rest pts respirations are even and unlabored, O2 sat 94% on room air, in NAD.

## 2024-02-07 NOTE — ED PROVIDER NOTE - SKIN, MLM
Skin normal color for race, warm, dry and intact. No evidence of rash. no external signs of trauma, no obvious tactile warmth

## 2024-02-07 NOTE — ED PROVIDER NOTE - CLINICAL SUMMARY MEDICAL DECISION MAKING FREE TEXT BOX
82 y/o M with PMHx of arrythmia, diverticulosis, HLD, HTN presents to the ED c/o chest pain, cough, and SOB x1 month. Pt reports that the cough is getting worse and is in a lot of pain now. Pain pleuritic and also exacerbated by torso movement. Positive EtOH abuse. Pt denies recent fall, LOC.   Plan on pan scan, CT, CXR, labs including blood culture, lactate,  troponin, flu/covid swab, EKG, cautious IV fluid, IV Tessalon, monitor, observe, reassess. Pt does not appear hypoxic. 84 y/o M with PMHx of arrythmia, diverticulosis, HLD, HTN presents to the ED c/o chest pain, cough, and SOB x1 month. Pt reports that the cough is getting worse and is in a lot of pain now. Pain pleuritic and also exacerbated by torso movement. Positive EtOH abuse. Pt denies recent fall, LOC.   Plan: pan scan CT, CXR, labs including blood culture, lactate,  troponin, flu/covid swab, EKG, cautious IV fluid, IV Tessalon, monitor, observe, reassess. Pt does not appear hypoxic.    00:35, C MD Ruma:  CXR report MARK.  CT studies + single l rib fx.  Labs + pre-renal, otherwise unremarkable.  Informed by ED rN that pt too unsteady on his feet, + failed ambulation trial, though no exertional desaturation noted.  Case d/w admit hospitalist DR. Rios who accepts Med admit.  Pt will need alcohol withdrawal regimen. 82 y/o M with PMHx of arrythmia, diverticulosis, HLD, HTN presents to the ED c/o chest pain, cough, and SOB x1 month. Pt reports that the cough is getting worse and is in a lot of pain now. Pain pleuritic and also exacerbated by torso movement. Positive EtOH abuse. Pt denies recent fall, LOC.   Plan: pan scan CT, CXR, labs including blood culture, lactate,  troponin, flu/covid swab, EKG, cautious IV fluid, IV Tessalon, monitor, observe, reassess. Pt does not appear hypoxic.    00:35, C MD Ruma:  CXR report MARK.  CT studies + single L rib fx.  Labs + pre-renal, otherwise unremarkable.  Informed by ED rN that pt too unsteady on his feet, + failed ambulation trial, though no exertional desaturation noted.  Case d/w admit hospitalist DR. Rios who accepts Med admit.  Pt will need alcohol withdrawal regimen. 82 y/o M with PMHx of arrythmia, diverticulosis, HLD, HTN presents to the ED c/o chest pain, cough, and SOB x1 month. Pt reports that the cough is getting worse and is in a lot of pain now. Pain pleuritic and also exacerbated by torso movement. Positive EtOH abuse. Pt denies recent fall, LOC.   Plan: pan scan CT, CXR, labs including blood culture, lactate,  troponin, flu/covid swab, EKG, cautious IV fluid, IV Tessalon, monitor, observe, reassess. Pt does not appear hypoxic.    00:35, PRICILA Hendrix MD:  CXR report MARK.  CT studies + single L rib fx.  Labs + pre-renal, otherwise unremarkable.  IV morphine administered for pt's pain. Informed by ED rN that pt too unsteady on his feet, + failed ambulation trial, though no exertional desaturation noted.  Case d/w admit hospitalist Dr. Rios who accepts Med admit.  Pt will need alcohol withdrawal regimen, IV Thiamine/folate ordered as well as prn Ativan orders to start CIWA protocol.

## 2024-02-07 NOTE — ED PROVIDER NOTE - OBJECTIVE STATEMENT
82 y/o M with PMHx of arrythmia, diverticulosis, HLD, HTN presents to the ED c/o chest pain, cough, and SOB x1 month. Pt reports that the cough is getting worse and is in a lot of pain now. Torso movement and deep breathing exacerbates pain. Pt suspects he hurt something during coughing fit. Denies vomiting, but endorses nausea. Pt not on o2. Pt has also been drinking EtOH. Pt drinks EtOH every day. Pt is a smoker. No recent falls or trauma. NO AC/ASA. NKDA. PCP [facility]. Pt on tumeric, b12 folic acid, Metoprolol 25mg, atorvastatin 20mg , Lisinopril, Trazodone 100mg. 82 y/o M with PMHx of arrythmia, diverticulosis, HLD, HTN, BIB pvt car to the ED c/o chest pain, cough, and SOB x1 month. Pt reports that the cough is getting worse and is in a lot of pain now. Torso movement and deep breathing exacerbates pain. Pt suspects he hurt something during coughing fit. Denies vomiting, but endorses nausea. Pt not on o2 at baseline. Pt has also been drinking EtOH. Pt drinks EtOH every day. Pt is a smoker. No recent falls or trauma. NO AC/ASA. NKDA. PCP [facility]. Pt on tumeric, b12 folic acid, Metoprolol 25mg, atorvastatin 20mg , Lisinopril, Trazodone 100mg.

## 2024-02-07 NOTE — ED PROVIDER NOTE - CARE PLAN
Principal Discharge DX:	Closed fracture of one rib of left side  Secondary Diagnosis:	Unsteady gait  Secondary Diagnosis:	H/O alcohol abuse  Secondary Diagnosis:	Coughing   1

## 2024-02-08 DIAGNOSIS — S22.32XA FRACTURE OF ONE RIB, LEFT SIDE, INITIAL ENCOUNTER FOR CLOSED FRACTURE: ICD-10-CM

## 2024-02-08 DIAGNOSIS — R07.9 CHEST PAIN, UNSPECIFIED: ICD-10-CM

## 2024-02-08 DIAGNOSIS — I10 ESSENTIAL (PRIMARY) HYPERTENSION: ICD-10-CM

## 2024-02-08 DIAGNOSIS — R94.31 ABNORMAL ELECTROCARDIOGRAM [ECG] [EKG]: ICD-10-CM

## 2024-02-08 LAB
ANION GAP SERPL CALC-SCNC: 6 MMOL/L — SIGNIFICANT CHANGE UP (ref 5–17)
BUN SERPL-MCNC: 35 MG/DL — HIGH (ref 7–23)
CALCIUM SERPL-MCNC: 8.3 MG/DL — LOW (ref 8.5–10.1)
CHLORIDE SERPL-SCNC: 108 MMOL/L — SIGNIFICANT CHANGE UP (ref 96–108)
CO2 SERPL-SCNC: 22 MMOL/L — SIGNIFICANT CHANGE UP (ref 22–31)
CREAT SERPL-MCNC: 0.86 MG/DL — SIGNIFICANT CHANGE UP (ref 0.5–1.3)
EGFR: 86 ML/MIN/1.73M2 — SIGNIFICANT CHANGE UP
GLUCOSE SERPL-MCNC: 122 MG/DL — HIGH (ref 70–99)
LACTATE SERPL-SCNC: 2 MMOL/L — SIGNIFICANT CHANGE UP (ref 0.7–2)
POTASSIUM SERPL-MCNC: 4.3 MMOL/L — SIGNIFICANT CHANGE UP (ref 3.5–5.3)
POTASSIUM SERPL-SCNC: 4.3 MMOL/L — SIGNIFICANT CHANGE UP (ref 3.5–5.3)
SODIUM SERPL-SCNC: 136 MMOL/L — SIGNIFICANT CHANGE UP (ref 135–145)

## 2024-02-08 PROCEDURE — 84443 ASSAY THYROID STIM HORMONE: CPT

## 2024-02-08 PROCEDURE — 70450 CT HEAD/BRAIN W/O DYE: CPT

## 2024-02-08 PROCEDURE — 82746 ASSAY OF FOLIC ACID SERUM: CPT

## 2024-02-08 PROCEDURE — 82962 GLUCOSE BLOOD TEST: CPT

## 2024-02-08 PROCEDURE — 80307 DRUG TEST PRSMV CHEM ANLYZR: CPT

## 2024-02-08 PROCEDURE — 84145 PROCALCITONIN (PCT): CPT

## 2024-02-08 PROCEDURE — 83880 ASSAY OF NATRIURETIC PEPTIDE: CPT

## 2024-02-08 PROCEDURE — 87635 SARS-COV-2 COVID-19 AMP PRB: CPT

## 2024-02-08 PROCEDURE — 99497 ADVNCD CARE PLAN 30 MIN: CPT | Mod: 25

## 2024-02-08 PROCEDURE — 85025 COMPLETE CBC W/AUTO DIFF WBC: CPT

## 2024-02-08 PROCEDURE — 80053 COMPREHEN METABOLIC PANEL: CPT

## 2024-02-08 PROCEDURE — 97530 THERAPEUTIC ACTIVITIES: CPT | Mod: GP

## 2024-02-08 PROCEDURE — 97116 GAIT TRAINING THERAPY: CPT | Mod: GP

## 2024-02-08 PROCEDURE — 80048 BASIC METABOLIC PNL TOTAL CA: CPT

## 2024-02-08 PROCEDURE — 93306 TTE W/DOPPLER COMPLETE: CPT

## 2024-02-08 PROCEDURE — 94640 AIRWAY INHALATION TREATMENT: CPT

## 2024-02-08 PROCEDURE — 93306 TTE W/DOPPLER COMPLETE: CPT | Mod: 26

## 2024-02-08 PROCEDURE — 97110 THERAPEUTIC EXERCISES: CPT | Mod: GP

## 2024-02-08 PROCEDURE — 83735 ASSAY OF MAGNESIUM: CPT

## 2024-02-08 PROCEDURE — 94760 N-INVAS EAR/PLS OXIMETRY 1: CPT

## 2024-02-08 PROCEDURE — 93005 ELECTROCARDIOGRAM TRACING: CPT

## 2024-02-08 PROCEDURE — 85027 COMPLETE CBC AUTOMATED: CPT

## 2024-02-08 PROCEDURE — 93010 ELECTROCARDIOGRAM REPORT: CPT

## 2024-02-08 PROCEDURE — 99222 1ST HOSP IP/OBS MODERATE 55: CPT

## 2024-02-08 PROCEDURE — 82607 VITAMIN B-12: CPT

## 2024-02-08 PROCEDURE — 82140 ASSAY OF AMMONIA: CPT

## 2024-02-08 PROCEDURE — 71045 X-RAY EXAM CHEST 1 VIEW: CPT

## 2024-02-08 PROCEDURE — 97162 PT EVAL MOD COMPLEX 30 MIN: CPT | Mod: GP

## 2024-02-08 PROCEDURE — 87040 BLOOD CULTURE FOR BACTERIA: CPT

## 2024-02-08 PROCEDURE — 36415 COLL VENOUS BLD VENIPUNCTURE: CPT

## 2024-02-08 PROCEDURE — 95816 EEG AWAKE AND DROWSY: CPT

## 2024-02-08 PROCEDURE — 84100 ASSAY OF PHOSPHORUS: CPT

## 2024-02-08 RX ORDER — LISINOPRIL 2.5 MG/1
40 TABLET ORAL DAILY
Refills: 0 | Status: DISCONTINUED | OUTPATIENT
Start: 2024-02-08 | End: 2024-02-09

## 2024-02-08 RX ORDER — MILK THISTLE 150 MG
1 CAPSULE ORAL
Qty: 0 | Refills: 0 | DISCHARGE

## 2024-02-08 RX ORDER — FOLIC ACID 0.8 MG
1 TABLET ORAL ONCE
Refills: 0 | Status: COMPLETED | OUTPATIENT
Start: 2024-02-08 | End: 2024-02-08

## 2024-02-08 RX ORDER — ONDANSETRON 8 MG/1
4 TABLET, FILM COATED ORAL ONCE
Refills: 0 | Status: DISCONTINUED | OUTPATIENT
Start: 2024-02-08 | End: 2024-02-21

## 2024-02-08 RX ORDER — PREGABALIN 225 MG/1
1000 CAPSULE ORAL DAILY
Refills: 0 | Status: DISCONTINUED | OUTPATIENT
Start: 2024-02-08 | End: 2024-02-09

## 2024-02-08 RX ORDER — BUDESONIDE AND FORMOTEROL FUMARATE DIHYDRATE 160; 4.5 UG/1; UG/1
2 AEROSOL RESPIRATORY (INHALATION)
Refills: 0 | Status: DISCONTINUED | OUTPATIENT
Start: 2024-02-08 | End: 2024-02-21

## 2024-02-08 RX ORDER — LISINOPRIL 2.5 MG/1
1 TABLET ORAL
Refills: 0 | DISCHARGE

## 2024-02-08 RX ORDER — POLYETHYLENE GLYCOL 3350 17 G/17G
17 POWDER, FOR SOLUTION ORAL EVERY 12 HOURS
Refills: 0 | Status: DISCONTINUED | OUTPATIENT
Start: 2024-02-08 | End: 2024-02-12

## 2024-02-08 RX ORDER — MORPHINE SULFATE 50 MG/1
2 CAPSULE, EXTENDED RELEASE ORAL EVERY 6 HOURS
Refills: 0 | Status: DISCONTINUED | OUTPATIENT
Start: 2024-02-08 | End: 2024-02-09

## 2024-02-08 RX ORDER — OXYCODONE HYDROCHLORIDE 5 MG/1
10 TABLET ORAL EVERY 6 HOURS
Refills: 0 | Status: DISCONTINUED | OUTPATIENT
Start: 2024-02-08 | End: 2024-02-09

## 2024-02-08 RX ORDER — TRAMADOL HYDROCHLORIDE 50 MG/1
50 TABLET ORAL ONCE
Refills: 0 | Status: DISCONTINUED | OUTPATIENT
Start: 2024-02-08 | End: 2024-02-08

## 2024-02-08 RX ORDER — FOLIC ACID 0.8 MG
1 TABLET ORAL
Refills: 0 | DISCHARGE

## 2024-02-08 RX ORDER — LISINOPRIL 2.5 MG/1
2 TABLET ORAL
Qty: 0 | Refills: 0 | DISCHARGE

## 2024-02-08 RX ORDER — FOLIC ACID 0.8 MG
1 TABLET ORAL DAILY
Refills: 0 | Status: DISCONTINUED | OUTPATIENT
Start: 2024-02-08 | End: 2024-02-09

## 2024-02-08 RX ORDER — MORPHINE SULFATE 50 MG/1
2 CAPSULE, EXTENDED RELEASE ORAL ONCE
Refills: 0 | Status: DISCONTINUED | OUTPATIENT
Start: 2024-02-08 | End: 2024-02-08

## 2024-02-08 RX ORDER — LANOLIN ALCOHOL/MO/W.PET/CERES
3 CREAM (GRAM) TOPICAL AT BEDTIME
Refills: 0 | Status: DISCONTINUED | OUTPATIENT
Start: 2024-02-08 | End: 2024-02-09

## 2024-02-08 RX ORDER — SODIUM CHLORIDE 9 MG/ML
500 INJECTION INTRAMUSCULAR; INTRAVENOUS; SUBCUTANEOUS
Refills: 0 | Status: DISCONTINUED | OUTPATIENT
Start: 2024-02-08 | End: 2024-02-09

## 2024-02-08 RX ORDER — TRAZODONE HCL 50 MG
1 TABLET ORAL
Refills: 0 | DISCHARGE

## 2024-02-08 RX ORDER — METOPROLOL TARTRATE 50 MG
1 TABLET ORAL
Qty: 0 | Refills: 0 | DISCHARGE

## 2024-02-08 RX ORDER — ATORVASTATIN CALCIUM 80 MG/1
20 TABLET, FILM COATED ORAL AT BEDTIME
Refills: 0 | Status: DISCONTINUED | OUTPATIENT
Start: 2024-02-08 | End: 2024-02-09

## 2024-02-08 RX ORDER — ATORVASTATIN CALCIUM 80 MG/1
1 TABLET, FILM COATED ORAL
Qty: 0 | Refills: 0 | DISCHARGE

## 2024-02-08 RX ORDER — METOPROLOL TARTRATE 50 MG
25 TABLET ORAL DAILY
Refills: 0 | Status: DISCONTINUED | OUTPATIENT
Start: 2024-02-08 | End: 2024-02-09

## 2024-02-08 RX ORDER — THIAMINE MONONITRATE (VIT B1) 100 MG
100 TABLET ORAL ONCE
Refills: 0 | Status: COMPLETED | OUTPATIENT
Start: 2024-02-08 | End: 2024-02-08

## 2024-02-08 RX ORDER — PREGABALIN 225 MG/1
1 CAPSULE ORAL
Refills: 0 | DISCHARGE

## 2024-02-08 RX ORDER — TRAZODONE HCL 50 MG
100 TABLET ORAL AT BEDTIME
Refills: 0 | Status: DISCONTINUED | OUTPATIENT
Start: 2024-02-08 | End: 2024-02-09

## 2024-02-08 RX ORDER — ALBUTEROL 90 UG/1
2 AEROSOL, METERED ORAL EVERY 6 HOURS
Refills: 0 | Status: DISCONTINUED | OUTPATIENT
Start: 2024-02-08 | End: 2024-02-21

## 2024-02-08 RX ORDER — ACETAMINOPHEN 500 MG
650 TABLET ORAL EVERY 6 HOURS
Refills: 0 | Status: DISCONTINUED | OUTPATIENT
Start: 2024-02-08 | End: 2024-02-21

## 2024-02-08 RX ORDER — SENNA PLUS 8.6 MG/1
2 TABLET ORAL AT BEDTIME
Refills: 0 | Status: DISCONTINUED | OUTPATIENT
Start: 2024-02-08 | End: 2024-02-09

## 2024-02-08 RX ORDER — AZITHROMYCIN 500 MG/1
500 TABLET, FILM COATED ORAL DAILY
Refills: 0 | Status: COMPLETED | OUTPATIENT
Start: 2024-02-08 | End: 2024-02-12

## 2024-02-08 RX ADMIN — Medication 100 MILLIGRAM(S): at 21:10

## 2024-02-08 RX ADMIN — ALBUTEROL 2 PUFF(S): 90 AEROSOL, METERED ORAL at 21:17

## 2024-02-08 RX ADMIN — Medication 1 MILLIGRAM(S): at 03:33

## 2024-02-08 RX ADMIN — ATORVASTATIN CALCIUM 20 MILLIGRAM(S): 80 TABLET, FILM COATED ORAL at 21:10

## 2024-02-08 RX ADMIN — Medication 600 MILLIGRAM(S): at 21:13

## 2024-02-08 RX ADMIN — MORPHINE SULFATE 2 MILLIGRAM(S): 50 CAPSULE, EXTENDED RELEASE ORAL at 03:04

## 2024-02-08 RX ADMIN — Medication 100 MILLIGRAM(S): at 01:19

## 2024-02-08 RX ADMIN — Medication 25 MILLIGRAM(S): at 09:49

## 2024-02-08 RX ADMIN — LISINOPRIL 40 MILLIGRAM(S): 2.5 TABLET ORAL at 09:50

## 2024-02-08 RX ADMIN — POLYETHYLENE GLYCOL 3350 17 GRAM(S): 17 POWDER, FOR SOLUTION ORAL at 09:51

## 2024-02-08 RX ADMIN — AZITHROMYCIN 500 MILLIGRAM(S): 500 TABLET, FILM COATED ORAL at 09:50

## 2024-02-08 RX ADMIN — MORPHINE SULFATE 2 MILLIGRAM(S): 50 CAPSULE, EXTENDED RELEASE ORAL at 02:34

## 2024-02-08 RX ADMIN — OXYCODONE HYDROCHLORIDE 10 MILLIGRAM(S): 5 TABLET ORAL at 10:19

## 2024-02-08 RX ADMIN — OXYCODONE HYDROCHLORIDE 10 MILLIGRAM(S): 5 TABLET ORAL at 09:49

## 2024-02-08 RX ADMIN — BUDESONIDE AND FORMOTEROL FUMARATE DIHYDRATE 2 PUFF(S): 160; 4.5 AEROSOL RESPIRATORY (INHALATION) at 21:14

## 2024-02-08 RX ADMIN — Medication 1 MILLIGRAM(S): at 09:49

## 2024-02-08 RX ADMIN — PREGABALIN 1000 MICROGRAM(S): 225 CAPSULE ORAL at 09:49

## 2024-02-08 RX ADMIN — SODIUM CHLORIDE 100 MILLILITER(S): 9 INJECTION INTRAMUSCULAR; INTRAVENOUS; SUBCUTANEOUS at 02:23

## 2024-02-08 RX ADMIN — Medication 600 MILLIGRAM(S): at 09:49

## 2024-02-08 RX ADMIN — OXYCODONE HYDROCHLORIDE 10 MILLIGRAM(S): 5 TABLET ORAL at 21:10

## 2024-02-08 RX ADMIN — OXYCODONE HYDROCHLORIDE 10 MILLIGRAM(S): 5 TABLET ORAL at 21:40

## 2024-02-08 RX ADMIN — Medication 3 MILLIGRAM(S): at 21:10

## 2024-02-08 NOTE — SBIRT NOTE ADULT - NSSBIRTUNABLESCR_GEN_A_CORE
Pt reports to drink a glass of wine with dinner and does not endorse any concerns regarding his alcohol consumption./Patient refused

## 2024-02-08 NOTE — H&P ADULT - NSICDXPASTSURGICALHX_GEN_ALL_CORE_FT
PAST SURGICAL HISTORY:  History of flexible sigmoidoscopy     History of right inguinal hernia repair     S/P cataract surgery b/l 2010    S/P ORIF (open reduction internal fixation) fracture left 1995    S/P tonsillectomy as a child

## 2024-02-08 NOTE — PATIENT PROFILE ADULT - FALL HARM RISK - HARM RISK INTERVENTIONS
Assistance with ambulation/Assistance OOB with selected safe patient handling equipment/Communicate Risk of Fall with Harm to all staff/Discuss with provider need for PT consult/Monitor for mental status changes/Monitor gait and stability/Provide patient with walking aids - walker, cane, crutches/Reinforce activity limits and safety measures with patient and family/Tailored Fall Risk Interventions/Toileting schedule using arm’s reach rule for commode and bathroom/Use of alarms - bed, chair and/or voice tab/Visual Cue: Yellow wristband and red socks/Bed in lowest position, wheels locked, appropriate side rails in place/Call bell, personal items and telephone in reach/Instruct patient to call for assistance before getting out of bed or chair/Non-slip footwear when patient is out of bed/[place] to call system/Physically safe environment - no spills, clutter or unnecessary equipment/Purposeful Proactive Rounding/Room/bathroom lighting operational, light cord in reach

## 2024-02-08 NOTE — CONSULT NOTE ADULT - PROBLEM SELECTOR RECOMMENDATION 9
Chronically abnormal ECG -- there is no significant change when compared to a prior ECG in his chart from 7/26/2017.  He had an outpatient work-up with Dr. Cartwright, including a nuclear stress test -- normal perfusion, normal LV function. No further work-up planned.

## 2024-02-08 NOTE — H&P ADULT - NSHPLABSRESULTS_GEN_ALL_CORE
Lab Results:  CBC  CBC Full  -  ( 07 Feb 2024 19:35 )  WBC Count : 8.08 K/uL  RBC Count : 3.41 M/uL  Hemoglobin : 12.6 g/dL  Hematocrit : 36.0 %  Platelet Count - Automated : 193 K/uL  Mean Cell Volume : 105.6 fl  Mean Cell Hemoglobin : 37.0 pg  Mean Cell Hemoglobin Concentration : 35.0 gm/dL  Auto Neutrophil # : 6.10 K/uL  Auto Lymphocyte # : 1.05 K/uL  Auto Monocyte # : 0.74 K/uL  Auto Eosinophil # : 0.10 K/uL  Auto Basophil # : 0.04 K/uL  Auto Neutrophil % : 75.5 %  Auto Lymphocyte % : 13.0 %  Auto Monocyte % : 9.2 %  Auto Eosinophil % : 1.2 %  Auto Basophil % : 0.5 %    .		Differential:	[] Automated		[] Manual  Chemistry                        12.6   8.08  )-----------( 193      ( 07 Feb 2024 19:35 )             36.0     02-08    136  |  108  |  35<H>  ----------------------------<  122<H>  4.3   |  22  |  0.86    Ca    8.3<L>      08 Feb 2024 01:17    TPro  7.4  /  Alb  3.7  /  TBili  0.5  /  DBili  x   /  AST  47<H>  /  ALT  77  /  AlkPhos  64  02-07    LIVER FUNCTIONS - ( 07 Feb 2024 19:35 )  Alb: 3.7 g/dL / Pro: 7.4 gm/dL / ALK PHOS: 64 U/L / ALT: 77 U/L / AST: 47 U/L / GGT: x             Urinalysis Basic - ( 08 Feb 2024 01:17 )    Color: x / Appearance: x / SG: x / pH: x  Gluc: 122 mg/dL / Ketone: x  / Bili: x / Urobili: x   Blood: x / Protein: x / Nitrite: x   Leuk Esterase: x / RBC: x / WBC x   Sq Epi: x / Non Sq Epi: x / Bacteria: x      RADIOLOGY RESULTS:    < from: CT Abdomen and Pelvis w/ IV Cont (02.07.24 @ 22:55) >      IMPRESSION:  Indeterminate age nondisplaced left posterior 11th rib fracture.   Otherwise, no evidence of acute traumatic injury to the chest, abdomen,   or pelvis.      < end of copied text > Lab Results:  CBC  CBC Full  -  ( 07 Feb 2024 19:35 )  WBC Count : 8.08 K/uL  RBC Count : 3.41 M/uL  Hemoglobin : 12.6 g/dL  Hematocrit : 36.0 %  Platelet Count - Automated : 193 K/uL  Mean Cell Volume : 105.6 fl  Mean Cell Hemoglobin : 37.0 pg  Mean Cell Hemoglobin Concentration : 35.0 gm/dL  Auto Neutrophil # : 6.10 K/uL  Auto Lymphocyte # : 1.05 K/uL  Auto Monocyte # : 0.74 K/uL  Auto Eosinophil # : 0.10 K/uL  Auto Basophil # : 0.04 K/uL  Auto Neutrophil % : 75.5 %  Auto Lymphocyte % : 13.0 %  Auto Monocyte % : 9.2 %  Auto Eosinophil % : 1.2 %  Auto Basophil % : 0.5 %    .		Differential:	[] Automated		[] Manual  Chemistry                        12.6   8.08  )-----------( 193      ( 07 Feb 2024 19:35 )             36.0     02-08    136  |  108  |  35<H>  ----------------------------<  122<H>  4.3   |  22  |  0.86    Ca    8.3<L>      08 Feb 2024 01:17    TPro  7.4  /  Alb  3.7  /  TBili  0.5  /  DBili  x   /  AST  47<H>  /  ALT  77  /  AlkPhos  64  02-07    LIVER FUNCTIONS - ( 07 Feb 2024 19:35 )  Alb: 3.7 g/dL / Pro: 7.4 gm/dL / ALK PHOS: 64 U/L / ALT: 77 U/L / AST: 47 U/L / GGT: x             Urinalysis Basic - ( 08 Feb 2024 01:17 )    Color: x / Appearance: x / SG: x / pH: x  Gluc: 122 mg/dL / Ketone: x  / Bili: x / Urobili: x   Blood: x / Protein: x / Nitrite: x   Leuk Esterase: x / RBC: x / WBC x   Sq Epi: x / Non Sq Epi: x / Bacteria: x      RADIOLOGY RESULTS:    < from: CT Head No Cont (02.07.24 @ 22:54) >      IMPRESSION:  CT Head:  No CT evidence of acute intracranial pathology.    CT Cervical Spine: No CT evidence of cervical spine fracture, traumatic   malalignment or suspicious osseous lesion.      < end of copied text >    < from: Xray Chest 1 View- PORTABLE-Urgent (02.07.24 @ 19:30) >    IMPRESSION:    No radiographic evidence of active chest disease..    < end of copied text >        < from: CT Abdomen and Pelvis w/ IV Cont (02.07.24 @ 22:55) >      IMPRESSION:  Indeterminate age nondisplaced left posterior 11th rib fracture.   Otherwise, no evidence of acute traumatic injury to the chest, abdomen,   or pelvis.      < end of copied text >

## 2024-02-08 NOTE — CONSULT NOTE ADULT - PROBLEM SELECTOR RECOMMENDATION 3
BP elevated - may be related to pain (he appeared uncomfortable while in L lateral recumbent position and having TTE; continue lisinopril -- outpatient titration of Rx if persistent elevation.

## 2024-02-08 NOTE — H&P ADULT - HISTORY OF PRESENT ILLNESS
84 y/o M with PMHx of arrythmia, diverticulosis, HLD, HTN presents to the ED c/o chest pain/Rib Pain and  cough x1 week. Pt reports that the cough is getting worse and is now having a lot of pain on left side of his rib. Torso movement and deep breathing exacerbates pain. Pt suspects he hurt something during coughing fit. Denies vomiting, but endorses nausea. Pt not on o2. Denies any trauma, palpitations, syncope or dyspnea.     In the ER, patient received IVF and Morphine   82 y/o M with PMHx of arrythmia, diverticulosis, HLD, HTN presents to the ED c/o chest pain/Rib Pain and  cough x1 week. Pt reports that the cough is getting worse and is now having a lot of pain on left side of his rib. Torso movement and deep breathing exacerbates pain. Pt suspects he hurt something during coughing fit. Denies vomiting, but endorses nausea. Pt not on o2. Denies any trauma, palpitations, syncop, abd pain or dyspnea.     In the ER, patient received IVF and Morphine

## 2024-02-08 NOTE — CONSULT NOTE ADULT - SUBJECTIVE AND OBJECTIVE BOX
CHIEF COMPLAINT: Patient is a 83y old  Male who presents with a chief complaint of Left sided rib pain and cough (08 Feb 2024 09:23)    HPI:  84 y/o man with a history of HTN, HLD, diverticulosis, abnormal ECG, who presented to the ED on 2/7/24 for the evaluation of chest pain and shortness of breath.  He tells me that he had a recent fall (however, notes in his chart indicate that he previously denied any type of trauma/fall).  Chest pain is sharp and exacerbated by palpation of the chest wall, deep breathing, and movements of the torso.  He reports no history of heart disease but describes a chronically abnormal ECG.  He saw Dr Adrian Cartwright in the office for cardiology consultation and nuclear stress test revealed normal perfusion.  He denies angina.      PAST MEDICAL & SURGICAL HISTORY:  Essential hypertension  Pure hypercholesterolemia  Diverticulosis  Arrhythmia  Inverted T wave  S/P ORIF (open reduction internal fixation) fracture left 1995  S/P tonsillectomy as a child  History of flexible sigmoidoscopy  History of right inguinal hernia repair  S/P cataract surgery b/l 2010    SOCIAL HISTORY:   Alcohol: Yes  Smoking: Past smoker    FAMILY HISTORY: cancer mother    MEDICATIONS  (STANDING):  atorvastatin 20 milliGRAM(s) Oral at bedtime  azithromycin   Tablet 500 milliGRAM(s) Oral daily  budesonide 160 MICROgram(s)/formoterol 4.5 MICROgram(s) Inhaler 2 Puff(s) Inhalation two times a day  cyanocobalamin 1000 MICROGram(s) Oral daily  folic acid 1 milliGRAM(s) Oral daily  guaiFENesin  milliGRAM(s) Oral every 12 hours  lisinopril 40 milliGRAM(s) Oral daily  metoprolol succinate ER 25 milliGRAM(s) Oral daily  polyethylene glycol 3350 17 Gram(s) Oral every 12 hours  senna 2 Tablet(s) Oral at bedtime  sodium chloride 0.9%. 500 milliLiter(s) (100 mL/Hr) IV Continuous <Continuous>  traZODone 100 milliGRAM(s) Oral at bedtime    MEDICATIONS  (PRN):  acetaminophen     Tablet .. 650 milliGRAM(s) Oral every 6 hours PRN Temp greater or equal to 38C (100.4F), Mild Pain (1 - 3)  albuterol    90 MICROgram(s) HFA Inhaler 2 Puff(s) Inhalation every 6 hours PRN Shortness of Breath and/or Wheezing  aluminum hydroxide/magnesium hydroxide/simethicone Suspension 30 milliLiter(s) Oral every 4 hours PRN Dyspepsia  bisacodyl Suppository 10 milliGRAM(s) Rectal daily PRN Constipation  LORazepam     Tablet 1 milliGRAM(s) Oral every 2 hours PRN CIWA-Ar score increase by 2 points and a total score of 7 or less  LORazepam   Injectable 1 milliGRAM(s) IV Push every 1 hour PRN CIWA-Ar score 8 or greater  melatonin 3 milliGRAM(s) Oral at bedtime PRN Insomnia  morphine  - Injectable 2 milliGRAM(s) IV Push every 6 hours PRN Severe Pain (7 - 10)  ondansetron Injectable 4 milliGRAM(s) IV Push once PRN Nausea and/or Vomiting  oxyCODONE    IR 10 milliGRAM(s) Oral every 6 hours PRN Moderate Pain (4 - 6)    Allergies: No Known Allergies    REVIEW OF SYSTEMS:  CONSTITUTIONAL: No fevers or chills  Eyes: No visual changes  NECK: No pain or stiffness  RESPIRATORY: + wheezing  CARDIOVASCULAR: No angina; +_ pleuritic chest pain  GASTROINTESTINAL:  No nausea, vomiting  GENITOURINARY: No dysuria, frequency or hematuria  NEUROLOGICAL: No numbness.  SKIN: No itching or rash  All other review of systems is negative unless indicated above    VITAL SIGNS:   Vital Signs Last 24 Hrs  T(C): 36.9 (08 Feb 2024 07:24), Max: 37.3 (08 Feb 2024 00:34)  T(F): 98.4 (08 Feb 2024 07:24), Max: 99.1 (08 Feb 2024 00:34)  HR: 70 (08 Feb 2024 07:24) (60 - 86)  BP: 149/62 (08 Feb 2024 07:24) (149/62 - 166/78)  BP(mean): 99 (07 Feb 2024 18:38) (99 - 99)  RR: 19 (08 Feb 2024 07:24) (16 - 22)  SpO2: 100% (08 Feb 2024 07:24) (95% - 100%)  Patient On (Oxygen Delivery Method): nasal cannula  O2 Flow (L/min): 2    PHYSICAL EXAM:  Constitutional: NAD, awake and alert  HEENT:  EOMI,  Pupils round, No oral cyanosis.  Pulmonary: Non-labored, diffuse wheezing  Cardiovascular: S1 and S2, regular, chest tender  Gastrointestinal: Bowel Sounds present, soft, nontender.   Lymph: No edema. No cervical lymphadenopathy.  Neurological: Alert, no focal deficits  Skin: No rashes.  Psych:  Mood & affect appropriate    LABS:                        12.6   8.08  )-----------( 193      ( 07 Feb 2024 19:35 )             36.0     136    |  108    |  35     ----------------------------<  122    4.3     |  22     |  0.86     TroponinI hsT: 7.36    12 Lead ECG (02.08.24 @ 09:30):  Normal sinus rhythm  Cannot rule out Inferior infarct , age undetermined  ST & T wave abnormality, consider anterolateral ischemia  Abnormal ECG    CT Chest w/ IV Cont (02.07.24 @ 22:55): Indeterminate age nondisplaced left posterior 11th rib fracture.     NM Nuclear Stress Multiple (02.09.23 @ 10:50):  Normal SPECT Myocardial Perfusion Imaging.  No evidence of reversible or fixed perfusion defects.  Normal left ventricular contractility with an ejection fraction of 71%   No regional wall motion abnormalities.  The left ventricle is normal in size.  The transient ischemic dilation (TID) ratio was normal.

## 2024-02-08 NOTE — PHYSICAL THERAPY INITIAL EVALUATION ADULT - ADDITIONAL COMMENTS
Wife drives to MD appointments   Wife is currently traveling out of the country, but will return on 02/10/2024  3 DARIUS home with handrails, 1 flight of stairs

## 2024-02-08 NOTE — H&P ADULT - ASSESSMENT
#Left Sided Rib Pain 2ndry to nondisplaced left posterior 11th rib fracture.   -Admit to med surg  -Morphine and Oxycodone for pain  -incentive spirometry   -FU EKG  -PT eval    #Cough 2ndry ?Bronchitis  -RVP neg  -CXR clear  -start Zithromax, Mucinex, symbicort, Albuterol PRN    #Constipation  -last bowel movmeent almost a week ago  -start miralax BID, senna, dulcolax supp PRN, enema PRN  -FU Bladder scan as abd slighlty distended , no abd pain     #HTN/HLD  -continue Home meds    #VTE ppx   -SCDS    #FULL CODE  #Left Sided Rib Pain 2ndry to nondisplaced left posterior 11th rib fracture.   -Admit to med surg  -Morphine and Oxycodone for pain  -incentive spirometry   -PT eval    #Abnormal EKG  -No old EKG to compare  -cardio consult   -FU TTE     #Cough 2ndry ?Bronchitis  -RVP neg  -CXR clear  -start Zithromax, Mucinex, symbicort, Albuterol PRN    #Constipation  -last bowel movmeent almost a week ago  -start miralax BID, senna, dulcolax supp PRN, enema PRN  -FU Bladder scan as abd slighlty distended , no abd pain     #HTN/HLD  -continue Home meds    #VTE ppx   -SCDS    #FULL CODE

## 2024-02-08 NOTE — H&P ADULT - NSHPSOCIALHISTORY_GEN_ALL_CORE
Patient lives at home; No tobacco or illicit drug use; Drinks 2-3 glasses of wine daily. no hx of alcohol withdrawal, seizures or temors Patient lives at home; No tobacco or illicit drug use; Drinks 2-3 glasses of wine daily. no hx of alcohol withdrawal, seizures or tremors

## 2024-02-08 NOTE — H&P ADULT - CONVERSATION DETAILS
Advanced Care Planning 47 minutes.  Discussion with patient regarding advance directives. We discussed diagnosis, prognosis and goals of care. At this timehe wishes to be fully resuscitated and mechanically ventilated if need arises. There are no limitations of any sort in his medical care and management. he is FULL CODE.

## 2024-02-09 LAB
ALBUMIN SERPL ELPH-MCNC: 3.2 G/DL — LOW (ref 3.3–5)
ALP SERPL-CCNC: 59 U/L — SIGNIFICANT CHANGE UP (ref 40–120)
ALT FLD-CCNC: 56 U/L — SIGNIFICANT CHANGE UP (ref 12–78)
ANION GAP SERPL CALC-SCNC: 5 MMOL/L — SIGNIFICANT CHANGE UP (ref 5–17)
ANION GAP SERPL CALC-SCNC: 6 MMOL/L — SIGNIFICANT CHANGE UP (ref 5–17)
AST SERPL-CCNC: 34 U/L — SIGNIFICANT CHANGE UP (ref 15–37)
BILIRUB SERPL-MCNC: 1.2 MG/DL — SIGNIFICANT CHANGE UP (ref 0.2–1.2)
BUN SERPL-MCNC: 19 MG/DL — SIGNIFICANT CHANGE UP (ref 7–23)
BUN SERPL-MCNC: 23 MG/DL — SIGNIFICANT CHANGE UP (ref 7–23)
CALCIUM SERPL-MCNC: 8.5 MG/DL — SIGNIFICANT CHANGE UP (ref 8.5–10.1)
CALCIUM SERPL-MCNC: 8.6 MG/DL — SIGNIFICANT CHANGE UP (ref 8.5–10.1)
CHLORIDE SERPL-SCNC: 100 MMOL/L — SIGNIFICANT CHANGE UP (ref 96–108)
CHLORIDE SERPL-SCNC: 104 MMOL/L — SIGNIFICANT CHANGE UP (ref 96–108)
CO2 SERPL-SCNC: 25 MMOL/L — SIGNIFICANT CHANGE UP (ref 22–31)
CO2 SERPL-SCNC: 28 MMOL/L — SIGNIFICANT CHANGE UP (ref 22–31)
CREAT SERPL-MCNC: 0.79 MG/DL — SIGNIFICANT CHANGE UP (ref 0.5–1.3)
CREAT SERPL-MCNC: 0.87 MG/DL — SIGNIFICANT CHANGE UP (ref 0.5–1.3)
EGFR: 86 ML/MIN/1.73M2 — SIGNIFICANT CHANGE UP
EGFR: 89 ML/MIN/1.73M2 — SIGNIFICANT CHANGE UP
ETHANOL SERPL-MCNC: <10 MG/DL — SIGNIFICANT CHANGE UP (ref 0–10)
GLUCOSE BLDC GLUCOMTR-MCNC: 128 MG/DL — HIGH (ref 70–99)
GLUCOSE SERPL-MCNC: 119 MG/DL — HIGH (ref 70–99)
GLUCOSE SERPL-MCNC: 211 MG/DL — HIGH (ref 70–99)
HCT VFR BLD CALC: 34.4 % — LOW (ref 39–50)
HCT VFR BLD CALC: 35 % — LOW (ref 39–50)
HGB BLD-MCNC: 11.7 G/DL — LOW (ref 13–17)
HGB BLD-MCNC: 12.3 G/DL — LOW (ref 13–17)
MAGNESIUM SERPL-MCNC: 2 MG/DL — SIGNIFICANT CHANGE UP (ref 1.6–2.6)
MAGNESIUM SERPL-MCNC: 2 MG/DL — SIGNIFICANT CHANGE UP (ref 1.6–2.6)
MCHC RBC-ENTMCNC: 34 GM/DL — SIGNIFICANT CHANGE UP (ref 32–36)
MCHC RBC-ENTMCNC: 35.1 GM/DL — SIGNIFICANT CHANGE UP (ref 32–36)
MCHC RBC-ENTMCNC: 36.5 PG — HIGH (ref 27–34)
MCHC RBC-ENTMCNC: 36.6 PG — HIGH (ref 27–34)
MCV RBC AUTO: 103.9 FL — HIGH (ref 80–100)
MCV RBC AUTO: 107.5 FL — HIGH (ref 80–100)
NT-PROBNP SERPL-SCNC: 737 PG/ML — HIGH (ref 0–450)
PHOSPHATE SERPL-MCNC: 2.6 MG/DL — SIGNIFICANT CHANGE UP (ref 2.5–4.5)
PHOSPHATE SERPL-MCNC: 3.9 MG/DL — SIGNIFICANT CHANGE UP (ref 2.5–4.5)
PLATELET # BLD AUTO: 164 K/UL — SIGNIFICANT CHANGE UP (ref 150–400)
PLATELET # BLD AUTO: 177 K/UL — SIGNIFICANT CHANGE UP (ref 150–400)
POTASSIUM SERPL-MCNC: 3.6 MMOL/L — SIGNIFICANT CHANGE UP (ref 3.5–5.3)
POTASSIUM SERPL-MCNC: 4.1 MMOL/L — SIGNIFICANT CHANGE UP (ref 3.5–5.3)
POTASSIUM SERPL-SCNC: 3.6 MMOL/L — SIGNIFICANT CHANGE UP (ref 3.5–5.3)
POTASSIUM SERPL-SCNC: 4.1 MMOL/L — SIGNIFICANT CHANGE UP (ref 3.5–5.3)
PROT SERPL-MCNC: 6.7 GM/DL — SIGNIFICANT CHANGE UP (ref 6–8.3)
RBC # BLD: 3.2 M/UL — LOW (ref 4.2–5.8)
RBC # BLD: 3.37 M/UL — LOW (ref 4.2–5.8)
RBC # FLD: 13.2 % — SIGNIFICANT CHANGE UP (ref 10.3–14.5)
RBC # FLD: 13.3 % — SIGNIFICANT CHANGE UP (ref 10.3–14.5)
SODIUM SERPL-SCNC: 133 MMOL/L — LOW (ref 135–145)
SODIUM SERPL-SCNC: 135 MMOL/L — SIGNIFICANT CHANGE UP (ref 135–145)
WBC # BLD: 6.51 K/UL — SIGNIFICANT CHANGE UP (ref 3.8–10.5)
WBC # BLD: 7.34 K/UL — SIGNIFICANT CHANGE UP (ref 3.8–10.5)
WBC # FLD AUTO: 6.51 K/UL — SIGNIFICANT CHANGE UP (ref 3.8–10.5)
WBC # FLD AUTO: 7.34 K/UL — SIGNIFICANT CHANGE UP (ref 3.8–10.5)

## 2024-02-09 PROCEDURE — 99291 CRITICAL CARE FIRST HOUR: CPT

## 2024-02-09 PROCEDURE — 99232 SBSQ HOSP IP/OBS MODERATE 35: CPT | Mod: 25

## 2024-02-09 PROCEDURE — 71045 X-RAY EXAM CHEST 1 VIEW: CPT | Mod: 26

## 2024-02-09 RX ORDER — IPRATROPIUM BROMIDE 0.2 MG/ML
500 SOLUTION, NON-ORAL INHALATION ONCE
Refills: 0 | Status: DISCONTINUED | OUTPATIENT
Start: 2024-02-09 | End: 2024-02-09

## 2024-02-09 RX ORDER — IPRATROPIUM/ALBUTEROL SULFATE 18-103MCG
3 AEROSOL WITH ADAPTER (GRAM) INHALATION ONCE
Refills: 0 | Status: COMPLETED | OUTPATIENT
Start: 2024-02-09 | End: 2024-02-09

## 2024-02-09 RX ORDER — THIAMINE MONONITRATE (VIT B1) 100 MG
100 TABLET ORAL DAILY
Refills: 0 | Status: DISCONTINUED | OUTPATIENT
Start: 2024-02-09 | End: 2024-02-14

## 2024-02-09 RX ORDER — FENTANYL CITRATE 50 UG/ML
100 INJECTION INTRAVENOUS ONCE
Refills: 0 | Status: DISCONTINUED | OUTPATIENT
Start: 2024-02-09 | End: 2024-02-09

## 2024-02-09 RX ORDER — ENOXAPARIN SODIUM 100 MG/ML
40 INJECTION SUBCUTANEOUS EVERY 24 HOURS
Refills: 0 | Status: DISCONTINUED | OUTPATIENT
Start: 2024-02-09 | End: 2024-02-21

## 2024-02-09 RX ORDER — DEXMEDETOMIDINE HYDROCHLORIDE IN 0.9% SODIUM CHLORIDE 4 UG/ML
0.2 INJECTION INTRAVENOUS
Qty: 200 | Refills: 0 | Status: DISCONTINUED | OUTPATIENT
Start: 2024-02-09 | End: 2024-02-09

## 2024-02-09 RX ORDER — FUROSEMIDE 40 MG
20 TABLET ORAL ONCE
Refills: 0 | Status: DISCONTINUED | OUTPATIENT
Start: 2024-02-09 | End: 2024-02-09

## 2024-02-09 RX ORDER — FUROSEMIDE 40 MG
80 TABLET ORAL ONCE
Refills: 0 | Status: COMPLETED | OUTPATIENT
Start: 2024-02-09 | End: 2024-02-09

## 2024-02-09 RX ORDER — HYDRALAZINE HCL 50 MG
10 TABLET ORAL ONCE
Refills: 0 | Status: COMPLETED | OUTPATIENT
Start: 2024-02-09 | End: 2024-02-09

## 2024-02-09 RX ORDER — FOLIC ACID 0.8 MG
1 TABLET ORAL DAILY
Refills: 0 | Status: DISCONTINUED | OUTPATIENT
Start: 2024-02-09 | End: 2024-02-14

## 2024-02-09 RX ORDER — DEXMEDETOMIDINE HYDROCHLORIDE IN 0.9% SODIUM CHLORIDE 4 UG/ML
0.2 INJECTION INTRAVENOUS
Qty: 400 | Refills: 0 | Status: DISCONTINUED | OUTPATIENT
Start: 2024-02-09 | End: 2024-02-11

## 2024-02-09 RX ORDER — METOPROLOL TARTRATE 50 MG
2.5 TABLET ORAL EVERY 6 HOURS
Refills: 0 | Status: DISCONTINUED | OUTPATIENT
Start: 2024-02-09 | End: 2024-02-10

## 2024-02-09 RX ORDER — FENTANYL CITRATE 50 UG/ML
1 INJECTION INTRAVENOUS
Qty: 2500 | Refills: 0 | Status: DISCONTINUED | OUTPATIENT
Start: 2024-02-09 | End: 2024-02-09

## 2024-02-09 RX ORDER — NOREPINEPHRINE BITARTRATE/D5W 8 MG/250ML
0.05 PLASTIC BAG, INJECTION (ML) INTRAVENOUS
Qty: 8 | Refills: 0 | Status: DISCONTINUED | OUTPATIENT
Start: 2024-02-09 | End: 2024-02-09

## 2024-02-09 RX ORDER — HYDRALAZINE HCL 50 MG
10 TABLET ORAL ONCE
Refills: 0 | Status: DISCONTINUED | OUTPATIENT
Start: 2024-02-09 | End: 2024-02-09

## 2024-02-09 RX ORDER — PIPERACILLIN AND TAZOBACTAM 4; .5 G/20ML; G/20ML
3.38 INJECTION, POWDER, LYOPHILIZED, FOR SOLUTION INTRAVENOUS ONCE
Refills: 0 | Status: COMPLETED | OUTPATIENT
Start: 2024-02-09 | End: 2024-02-09

## 2024-02-09 RX ORDER — THIAMINE MONONITRATE (VIT B1) 100 MG
100 TABLET ORAL DAILY
Refills: 0 | Status: DISCONTINUED | OUTPATIENT
Start: 2024-02-09 | End: 2024-02-09

## 2024-02-09 RX ORDER — PIPERACILLIN AND TAZOBACTAM 4; .5 G/20ML; G/20ML
3.38 INJECTION, POWDER, LYOPHILIZED, FOR SOLUTION INTRAVENOUS EVERY 8 HOURS
Refills: 0 | Status: DISCONTINUED | OUTPATIENT
Start: 2024-02-10 | End: 2024-02-12

## 2024-02-09 RX ADMIN — OXYCODONE HYDROCHLORIDE 10 MILLIGRAM(S): 5 TABLET ORAL at 13:42

## 2024-02-09 RX ADMIN — BUDESONIDE AND FORMOTEROL FUMARATE DIHYDRATE 2 PUFF(S): 160; 4.5 AEROSOL RESPIRATORY (INHALATION) at 08:21

## 2024-02-09 RX ADMIN — LISINOPRIL 40 MILLIGRAM(S): 2.5 TABLET ORAL at 07:50

## 2024-02-09 RX ADMIN — OXYCODONE HYDROCHLORIDE 10 MILLIGRAM(S): 5 TABLET ORAL at 07:51

## 2024-02-09 RX ADMIN — OXYCODONE HYDROCHLORIDE 10 MILLIGRAM(S): 5 TABLET ORAL at 08:21

## 2024-02-09 RX ADMIN — Medication 2 MILLIGRAM(S): at 13:48

## 2024-02-09 RX ADMIN — DEXMEDETOMIDINE HYDROCHLORIDE IN 0.9% SODIUM CHLORIDE 4.29 MICROGRAM(S)/KG/HR: 4 INJECTION INTRAVENOUS at 20:51

## 2024-02-09 RX ADMIN — Medication 2.5 MILLIGRAM(S): at 18:06

## 2024-02-09 RX ADMIN — Medication 10 MILLIGRAM(S): at 22:17

## 2024-02-09 RX ADMIN — Medication 2 MILLIGRAM(S): at 14:47

## 2024-02-09 RX ADMIN — Medication 600 MILLIGRAM(S): at 09:23

## 2024-02-09 RX ADMIN — Medication 2 MILLIGRAM(S): at 13:47

## 2024-02-09 RX ADMIN — AZITHROMYCIN 500 MILLIGRAM(S): 500 TABLET, FILM COATED ORAL at 09:23

## 2024-02-09 RX ADMIN — Medication 3 MILLILITER(S): at 13:45

## 2024-02-09 RX ADMIN — DEXMEDETOMIDINE HYDROCHLORIDE IN 0.9% SODIUM CHLORIDE 4.29 MICROGRAM(S)/KG/HR: 4 INJECTION INTRAVENOUS at 18:07

## 2024-02-09 RX ADMIN — Medication 3 MILLILITER(S): at 13:38

## 2024-02-09 RX ADMIN — Medication 80 MILLIGRAM(S): at 14:00

## 2024-02-09 RX ADMIN — POLYETHYLENE GLYCOL 3350 17 GRAM(S): 17 POWDER, FOR SOLUTION ORAL at 09:24

## 2024-02-09 RX ADMIN — Medication 2 MILLIGRAM(S): at 12:04

## 2024-02-09 RX ADMIN — Medication 3 MILLILITER(S): at 13:30

## 2024-02-09 RX ADMIN — Medication 125 MILLIGRAM(S): at 13:43

## 2024-02-09 RX ADMIN — PREGABALIN 1000 MICROGRAM(S): 225 CAPSULE ORAL at 09:23

## 2024-02-09 RX ADMIN — Medication 25 MILLIGRAM(S): at 07:51

## 2024-02-09 RX ADMIN — PIPERACILLIN AND TAZOBACTAM 200 GRAM(S): 4; .5 INJECTION, POWDER, LYOPHILIZED, FOR SOLUTION INTRAVENOUS at 20:30

## 2024-02-09 RX ADMIN — Medication 1 MILLIGRAM(S): at 09:23

## 2024-02-09 RX ADMIN — DEXMEDETOMIDINE HYDROCHLORIDE IN 0.9% SODIUM CHLORIDE 4.29 MICROGRAM(S)/KG/HR: 4 INJECTION INTRAVENOUS at 16:09

## 2024-02-09 RX ADMIN — Medication 100 MILLIGRAM(S): at 22:14

## 2024-02-09 RX ADMIN — Medication 1 MILLIGRAM(S): at 22:13

## 2024-02-09 RX ADMIN — OXYCODONE HYDROCHLORIDE 10 MILLIGRAM(S): 5 TABLET ORAL at 13:12

## 2024-02-09 RX ADMIN — Medication 1 MILLIGRAM(S): at 11:38

## 2024-02-09 RX ADMIN — PIPERACILLIN AND TAZOBACTAM 25 GRAM(S): 4; .5 INJECTION, POWDER, LYOPHILIZED, FOR SOLUTION INTRAVENOUS at 23:38

## 2024-02-09 RX ADMIN — ENOXAPARIN SODIUM 40 MILLIGRAM(S): 100 INJECTION SUBCUTANEOUS at 18:07

## 2024-02-09 NOTE — PROGRESS NOTE ADULT - ASSESSMENT
82 y/o M with PMHx of arrythmia, diverticulosis, HLD, HTN presents to the ED c/o chest pain/Rib Pain and  cough x1 week. Pt reports that the cough is getting worse and is now having a lot of pain on left side of his rib. Torso movement and deep breathing exacerbates pain. Pt suspects he hurt something during coughing fit. Denies vomiting, but endorses nausea. Pt not on o2. Denies any trauma, palpitations, syncop, abd pain or dyspnea.       # acute 11th rib fractue 2/2 to mechanical fall   - non displaced, no ptx   - pain control PRN   - incentive spirometer, encourage cough / deep breathing   - on room air  - started on azithro for likely bronchitis.   - antitussive bid     # cough possible atypical pna     # etoh abuse / etoh withdrawal   - [facility] protocol   - supplement b12, folate and thaimine   - per family multiple empty bottles of hard liquior and wine around the house they believe he at least drinks 1 bottle a day     # vte ppx   - scds   - high risk for falls

## 2024-02-09 NOTE — PROGRESS NOTE ADULT - ASSESSMENT
82 y/o M with PMHx of arrythmia, diverticulosis, HLD, HTN presents to the ED c/o chest pain/Rib Pain and  cough x1 week with  #ETOH withdrawal   #Acute pulmonary edema  #COPD vs Asthma     Neuro:  - Given 6mg ativan and started on precedex ggt for increased agitation and work of breathing    - Avoid Neuro Deliriogenic / sedative medications  - Ativan Taper for CIWA protocol with 2 mg Ativan every 6 hours for CIWA >8   - Valium- Q15 min till therapeutic effect: 10 mg x2, 20 mg x2, 40mg x4***  - Phenobarb 10 mg/kg bolus IVPB over 30 min, 130 mg IVP q30 min prn  for anxiety/agitation for RASS > 0.  - 24 hr accumultive dose target of 15-20 mg/kg ideal body weight, serum equivelent roughly >25ug/ml  - Thiamine 100 mg IV daily  - Possible Wernickes give 500 mg Q8H  - Folic Acid 1 mg IV daily  - End tidal CO2 monitoring  - Precedex gtt if needed    CV:  - ****Pressors  - Avoiding fluid overload   - 2 pressor shock actively titrating Levo to maintain MAP >65 with Vaso as Adjunct therapy    Pulm:  - continue Decadron for enhanced anti-inflammatory effect  - Low TV lung protective strategies 4-6mL/kg,  - will utilize PEEP for alveolar recruitment is pt desats  - Actively titrating ventilator settings to maintain SpO2 > 92%,  - Incentive spirometry  - Vent Bundle in SITU  - End Tidal CO2 monitoring                  GI:  - Cont IV Protonix 40mg IVP Daily,   - Enteral feeds as tolerated to avoid Stress ulceration and optimize nutritional state as pt is increased risk of refeeding syndrome.    Renal:  - Even to net negative fluid balance as tolerated by hemodynamics and renal fx.    - Preserved Renal Function Continue to monitor Bun/Cr and UOP  - Replacing electrolytes as needed with Goal K> 4, PO> 3, Mg> 2               - Strict I&O's  - Avoid Nephro toxic medication  - Renally dose meds    Heme:  - SCDs for DVT/PE ppx   - *** AC    ID:  - WBC **,  remains afebrile with no antipyretics administered   - Continue ABX  ****  - Past Cultures negative, no new Cx pending *****  - Microbiology and Radiology reviewed   - trend CBC with diff, CMP  and fever curve  - Avoiding antibiotics unless there is a concern for a bacterial/fungal infection    Endo:  - ISS for aggressive glycemic control to limit FS glucose to < 180mg/dl.  - Keep Euthyroid    Critical Care Time:  **minutes were spent assessing the patient's presenting problems of acute illness that pose a high probability of life threatening  deterioration or end organ damage / dysfunction.  Medical desicion making includes initiation / continuation of plan or care review data/ labwork/ radiographic study, direct patient care bedside ,  discussions with  consultants regarding care,  evaluation and interpretation of vital signs, any necessary ventilator management,   NIV or BIPAP changes  or initiation,    discussions with multidisipliary team,  am or pm rounds, discussions of goals of care with patient and family all non-inclusive of procedures.    Plan discussed with  **** 82 y/o M with PMHx of arrythmia, diverticulosis, HLD, HTN presents to the ED c/o chest pain/Rib Pain and  cough x1 week with  #ETOH withdrawal   #Acute pulmonary edema  #COPD vs Asthma     Patient found to have increased work of breathing in setting of alcohol withdrawal and pulmonary edema was provided Duoneb, given ativan 4mg and brought to CCU for risk of further decompensation due to agitation and possible aspiration.        Neuro:  - Given additional 2 mg ativan and started on precedex ggt for increased agitation and work of breathing    - Avoid Neuro Deliriogenic / sedative medications  - Ativan Taper for CIWA protocol with 2 mg Ativan every 6 hours for CIWA >8   - 24 hr accumultive dose target of 15-20 mg/kg ideal body weight, serum equivelent roughly >25ug/ml  - Thiamine 100 mg IV daily  - Folic Acid 1 mg IV daily  - End tidal CO2 monitoring      CV:  - Patient with recent echo showing moderate MVR and normal EF. Hemodynamically stable and CXR pulmonary congestion.    - Avoiding fluid overload   - 2 pressor shock actively titrating Levo to maintain MAP >65 with Vaso as Adjunct therapy    Pulm:  - continue Decadron for enhanced anti-inflammatory effect  - Low TV lung protective strategies 4-6mL/kg,  - will utilize PEEP for alveolar recruitment is pt desats  - Actively titrating ventilator settings to maintain SpO2 > 92%,  - Incentive spirometry  - Vent Bundle in SITU  - End Tidal CO2 monitoring                  GI:  - Cont IV Protonix 40mg IVP Daily,   - Enteral feeds as tolerated to avoid Stress ulceration and optimize nutritional state as pt is increased risk of refeeding syndrome.    Renal:  - Even to net negative fluid balance as tolerated by hemodynamics and renal fx.    - Preserved Renal Function Continue to monitor Bun/Cr and UOP  - Replacing electrolytes as needed with Goal K> 4, PO> 3, Mg> 2               - Strict I&O's  - Avoid Nephro toxic medication  - Renally dose meds    Heme:  - SCDs for DVT/PE ppx   - *** AC    ID:  - WBC **,  remains afebrile with no antipyretics administered   - Continue ABX  ****  - Past Cultures negative, no new Cx pending *****  - Microbiology and Radiology reviewed   - trend CBC with diff, CMP  and fever curve  - Avoiding antibiotics unless there is a concern for a bacterial/fungal infection    Endo:  - ISS for aggressive glycemic control to limit FS glucose to < 180mg/dl.  - Keep Euthyroid    Critical Care Time:  **minutes were spent assessing the patient's presenting problems of acute illness that pose a high probability of life threatening  deterioration or end organ damage / dysfunction.  Medical desicion making includes initiation / continuation of plan or care review data/ labwork/ radiographic study, direct patient care bedside ,  discussions with  consultants regarding care,  evaluation and interpretation of vital signs, any necessary ventilator management,   NIV or BIPAP changes  or initiation,    discussions with multidisipliary team,  am or pm rounds, discussions of goals of care with patient and family all non-inclusive of procedures.    Plan discussed with  **** 84 y/o M with PMHx of arrythmia, diverticulosis, HLD, HTN presents to the ED c/o chest pain/Rib Pain and  cough x1 week with  #ETOH withdrawal   #Acute pulmonary edema  #COPD vs Asthma     Patient found to have increased work of breathing in setting of alcohol withdrawal and pulmonary edema was provided Duoneb, given ativan 4mg and brought to CCU for risk of further decompensation due to agitation and possible aspiration.        Neuro:  - Given additional 2 mg ativan and started on precedex ggt for increased agitation and work of breathing    - Avoid Neuro Deliriogenic / sedative medications  - Ativan Taper for CIWA protocol with 2 mg Ativan every 6 hours for CIWA >8   - 24 hr accumultive dose target of 15-20 mg/kg ideal body weight, serum equivelent roughly >25ug/ml  - Thiamine 100 mg IV daily  - Folic Acid 1 mg IV daily  - End tidal CO2 monitoring      CV:  - Patient with recent echo showing moderate MVR and normal EF and is hemodynamically stable.  - Continue on home antihypertensives     - Avoiding fluid overload    Pulm:  - Patient with possible COPD/Asthma continue solumedrol and Duoneb.   - Flash pulmonary edema given lasix 80mg, good UOP decreased distress.   - Incentive spirometry  - End Tidal CO2 monitoring                  GI:  - Cont IV Protonix 40mg IVP Daily,   - Enteral feeds as tolerated to avoid Stress ulceration and optimize nutritional state as pt is increased risk of refeeding syndrome.    Renal:  - Even to net negative fluid balance as tolerated by hemodynamics and renal fx.    - Preserved Renal Function Continue to monitor Bun/Cr and UOP  - Replacing electrolytes as needed with Goal K> 4, PO> 3, Mg> 2               - Strict I&O's  - Avoid Nephro toxic medication  - Renally dose meds    Heme:  - SCDs for DVT/PE ppx   - Start Lovenox for DVT ppx    ID:  - WBC 7.34,  remains afebrile with no antipyretics administered    - Patient at risk for aspiration empirically started on zosyn   - blood cultures ordered and pending   - Microbiology and Radiology reviewed   - trend CBC with diff, CMP  and fever curve    Endo:  - ISS for aggressive glycemic control to limit FS glucose to < 180mg/dl.  - Keep Euthyroid    Critical Care Time:  60 minutes were spent assessing the patient's presenting problems of acute illness that pose a high probability of life threatening  deterioration or end organ damage / dysfunction.  Medical desicion making includes initiation / continuation of plan or care review data/ labwork/ radiographic study, direct patient care bedside ,  discussions with  consultants regarding care,  evaluation and interpretation of vital signs, any necessary ventilator management,   NIV or BIPAP changes  or initiation,    discussions with multidisipliary team,  am or pm rounds, discussions of goals of care with patient and family all non-inclusive of procedures.    Plan discussed with Dr ****

## 2024-02-09 NOTE — PROGRESS NOTE ADULT - SUBJECTIVE AND OBJECTIVE BOX
Patient is a 83y old  Male who presents with a chief complaint of Left sided rib pain and cough (09 Feb 2024 11:51)      BRIEF HOSPITAL COURSE:  84 y/o M with PMHx of arrythmia, diverticulosis, HLD, HTN presents to the ED c/o chest pain/Rib Pain and  cough x1 week. Pt reports that the cough is getting worse and is now having a lot of pain on left side of his rib. Torso movement and deep breathing exacerbates pain. Pt suspects he hurt something during coughing fit. Denies vomiting, but endorses nausea. Pt not on o2. Denies any trauma, palpitations, syncop, abd pain or dyspnea.     Events last 24 hours:  2/9: RRT called on floor patient having SOB, increased work of breathing, and increased agitation. Patient is chronic alcoholic and is having DT's was started on ativan today, patient noted to have significantly worsened agitation and becoming hypoxic, started on Duoneb and         PAST MEDICAL & SURGICAL HISTORY:  Essential hypertension      Pure hypercholesterolemia      Diverticulosis      Arrhythmia      Inverted T wave      S/P ORIF (open reduction internal fixation) fracture  left 1995      S/P tonsillectomy  as a child      History of flexible sigmoidoscopy      History of right inguinal hernia repair      S/P cataract surgery  b/l 2010          Review of Systems:  CONSTITUTIONAL: No fever, chills, or fatigue  EYES: No eye pain, visual disturbances, or discharge  ENMT:  No difficulty hearing, tinnitus, vertigo; No sinus or throat pain  NECK: No pain or stiffness  RESPIRATORY: No cough, wheezing, chills or hemoptysis; No shortness of breath  CARDIOVASCULAR: No chest pain, palpitations, dizziness, or leg swelling  GASTROINTESTINAL: No abdominal or epigastric pain. No nausea, vomiting, or hematemesis; No diarrhea or constipation. No melena or hematochezia.  GENITOURINARY: No dysuria, frequency, hematuria, or incontinence  NEUROLOGICAL: No headaches, memory loss, loss of strength, numbness, or tremors  SKIN: No itching, burning, rashes, or lesions   MUSCULOSKELETAL: No joint pain or swelling; No muscle, back, or extremity pain  PSYCHIATRIC: No depression, anxiety, mood swings, or difficulty sleeping      Medications:  azithromycin   Tablet 500 milliGRAM(s) Oral daily    lisinopril 40 milliGRAM(s) Oral daily  metoprolol succinate ER 25 milliGRAM(s) Oral daily    albuterol    90 MICROgram(s) HFA Inhaler 2 Puff(s) Inhalation every 6 hours PRN  albuterol/ipratropium for Nebulization 3 milliLiter(s) Nebulizer once  albuterol/ipratropium for Nebulization. 3 milliLiter(s) Nebulizer once  albuterol/ipratropium for Nebulization. 3 milliLiter(s) Nebulizer once  budesonide 160 MICROgram(s)/formoterol 4.5 MICROgram(s) Inhaler 2 Puff(s) Inhalation two times a day  guaiFENesin  milliGRAM(s) Oral every 12 hours    acetaminophen     Tablet .. 650 milliGRAM(s) Oral every 6 hours PRN  dexMEDEtomidine Infusion 0.2 MICROgram(s)/kG/Hr IV Continuous <Continuous>  LORazepam   Injectable 2 milliGRAM(s) IV Push every 4 hours  LORazepam   Injectable   IV Push   melatonin 3 milliGRAM(s) Oral at bedtime PRN  morphine  - Injectable 2 milliGRAM(s) IV Push every 6 hours PRN  ondansetron Injectable 4 milliGRAM(s) IV Push once PRN  oxyCODONE    IR 10 milliGRAM(s) Oral every 6 hours PRN  traZODone 100 milliGRAM(s) Oral at bedtime      enoxaparin Injectable 40 milliGRAM(s) SubCutaneous every 24 hours    aluminum hydroxide/magnesium hydroxide/simethicone Suspension 30 milliLiter(s) Oral every 4 hours PRN  bisacodyl Suppository 10 milliGRAM(s) Rectal daily PRN  polyethylene glycol 3350 17 Gram(s) Oral every 12 hours  senna 2 Tablet(s) Oral at bedtime      atorvastatin 20 milliGRAM(s) Oral at bedtime    cyanocobalamin 1000 MICROGram(s) Oral daily  folic acid 1 milliGRAM(s) Oral daily  thiamine 100 milliGRAM(s) Oral daily                ICU Vital Signs Last 24 Hrs  T(C): 36.8 (09 Feb 2024 12:00), Max: 37.5 (08 Feb 2024 20:00)  T(F): 98.3 (09 Feb 2024 12:00), Max: 99.5 (08 Feb 2024 20:00)  HR: 92 (09 Feb 2024 14:50) (71 - 92)  BP: 132/70 (09 Feb 2024 12:00) (117/62 - 172/90)  BP(mean): 93 (08 Feb 2024 16:00) (93 - 93)  ABP: --  ABP(mean): --  RR: 20 (09 Feb 2024 14:50) (16 - 20)  SpO2: 93% (09 Feb 2024 14:50) (93% - 98%)    O2 Parameters below as of 09 Feb 2024 12:00  Patient On (Oxygen Delivery Method): room air                I&O's Detail    08 Feb 2024 07:01  -  09 Feb 2024 07:00  --------------------------------------------------------  IN:    Oral Fluid: 250 mL  Total IN: 250 mL    OUT:    Voided (mL): 700 mL  Total OUT: 700 mL    Total NET: -450 mL            LABS:                        11.7   7.34  )-----------( 177      ( 09 Feb 2024 07:10 )             34.4     02-09    135  |  104  |  23  ----------------------------<  119<H>  4.1   |  25  |  0.87    Ca    8.6      09 Feb 2024 07:10  Phos  2.6     02-09  Mg     2.0     02-09    TPro  6.7  /  Alb  3.2<L>  /  TBili  1.2  /  DBili  x   /  AST  34  /  ALT  56  /  AlkPhos  59  02-09          CAPILLARY BLOOD GLUCOSE      POCT Blood Glucose.: 128 mg/dL (09 Feb 2024 14:01)      Urinalysis Basic - ( 09 Feb 2024 07:10 )    Color: x / Appearance: x / SG: x / pH: x  Gluc: 119 mg/dL / Ketone: x  / Bili: x / Urobili: x   Blood: x / Protein: x / Nitrite: x   Leuk Esterase: x / RBC: x / WBC x   Sq Epi: x / Non Sq Epi: x / Bacteria: x      CULTURES:  Culture Results:   No growth at 24 hours (02-07-24 @ 23:15)  Culture Results:   No growth at 24 hours (02-07-24 @ 23:15)        Physical Examination:    General: No acute distress.  Alert, oriented, interactive, nonfocal    HEENT: Pupils equal, reactive to light.  Symmetric.    PULM: Clear to auscultation bilaterally, no significant sputum production    CVS: Regular rate and rhythm, no murmurs, rubs, or gallops    ABD: Soft, nondistended, nontender, normoactive bowel sounds, no masses    EXT: No edema, nontender    SKIN: Warm and well perfused, no rashes noted.    NEURO: A&Ox3, strength 5/5 all extremities, cranial nerves grossly intact, no focal deficits        RADIOLOGY: ***        CRITICAL CARE TIME SPENT: ***  Time spent evaluating/treating patient with medical issues that pose a high risk for life threatening deterioration and/or end-organ damage, reviewing data/labs/imaging, discussing case with multidisciplinary team, discussing plan/goals of care with patient/family. Non-inclusive of procedure time.   Patient is a 83y old  Male who presents with a chief complaint of Left sided rib pain and cough (09 Feb 2024 11:51)      BRIEF HOSPITAL COURSE:  82 y/o M with PMHx of arrythmia, diverticulosis, HLD, HTN presents to the ED c/o chest pain/Rib Pain and  cough x1 week. Pt reports that the cough is getting worse and is now having a lot of pain on left side of his rib. Torso movement and deep breathing exacerbates pain. Pt suspects he hurt something during coughing fit. Denies vomiting, but endorses nausea. Pt not on o2. Denies any trauma, palpitations, syncop, abd pain or dyspnea.     Events last 24 hours:  2/9: RRT called on floor patient having SOB, increased work of breathing, and increased agitation. Patient is chronic alcoholic and is having DT's was started on ativan today, patient noted to have significantly worsened agitation and becoming hypoxic, started on Duoneb for suspected COPD/Asthma exacerbation. Patient was agitated pulling off venti mask and increased work of breathing sating 93%.          PAST MEDICAL & SURGICAL HISTORY:  Essential hypertension      Pure hypercholesterolemia      Diverticulosis      Arrhythmia      Inverted T wave      S/P ORIF (open reduction internal fixation) fracture  left 1995      S/P tonsillectomy  as a child      History of flexible sigmoidoscopy      History of right inguinal hernia repair      S/P cataract surgery  b/l 2010          Review of Systems:  CONSTITUTIONAL: No fever, chills, or fatigue  EYES: No eye pain, visual disturbances, or discharge  ENMT:  No difficulty hearing, tinnitus, vertigo; No sinus or throat pain  NECK: No pain or stiffness  RESPIRATORY: No cough, wheezing, chills or hemoptysis; No shortness of breath  CARDIOVASCULAR: No chest pain, palpitations, dizziness, or leg swelling  GASTROINTESTINAL: No abdominal or epigastric pain. No nausea, vomiting, or hematemesis; No diarrhea or constipation. No melena or hematochezia.  GENITOURINARY: No dysuria, frequency, hematuria, or incontinence  NEUROLOGICAL: No headaches, memory loss, loss of strength, numbness, or tremors  SKIN: No itching, burning, rashes, or lesions   MUSCULOSKELETAL: No joint pain or swelling; No muscle, back, or extremity pain  PSYCHIATRIC: No depression, anxiety, mood swings, or difficulty sleeping      Medications:  azithromycin   Tablet 500 milliGRAM(s) Oral daily    lisinopril 40 milliGRAM(s) Oral daily  metoprolol succinate ER 25 milliGRAM(s) Oral daily    albuterol    90 MICROgram(s) HFA Inhaler 2 Puff(s) Inhalation every 6 hours PRN  albuterol/ipratropium for Nebulization 3 milliLiter(s) Nebulizer once  albuterol/ipratropium for Nebulization. 3 milliLiter(s) Nebulizer once  albuterol/ipratropium for Nebulization. 3 milliLiter(s) Nebulizer once  budesonide 160 MICROgram(s)/formoterol 4.5 MICROgram(s) Inhaler 2 Puff(s) Inhalation two times a day  guaiFENesin  milliGRAM(s) Oral every 12 hours    acetaminophen     Tablet .. 650 milliGRAM(s) Oral every 6 hours PRN  dexMEDEtomidine Infusion 0.2 MICROgram(s)/kG/Hr IV Continuous <Continuous>  LORazepam   Injectable 2 milliGRAM(s) IV Push every 4 hours  LORazepam   Injectable   IV Push   melatonin 3 milliGRAM(s) Oral at bedtime PRN  morphine  - Injectable 2 milliGRAM(s) IV Push every 6 hours PRN  ondansetron Injectable 4 milliGRAM(s) IV Push once PRN  oxyCODONE    IR 10 milliGRAM(s) Oral every 6 hours PRN  traZODone 100 milliGRAM(s) Oral at bedtime      enoxaparin Injectable 40 milliGRAM(s) SubCutaneous every 24 hours    aluminum hydroxide/magnesium hydroxide/simethicone Suspension 30 milliLiter(s) Oral every 4 hours PRN  bisacodyl Suppository 10 milliGRAM(s) Rectal daily PRN  polyethylene glycol 3350 17 Gram(s) Oral every 12 hours  senna 2 Tablet(s) Oral at bedtime      atorvastatin 20 milliGRAM(s) Oral at bedtime    cyanocobalamin 1000 MICROGram(s) Oral daily  folic acid 1 milliGRAM(s) Oral daily  thiamine 100 milliGRAM(s) Oral daily                ICU Vital Signs Last 24 Hrs  T(C): 36.8 (09 Feb 2024 12:00), Max: 37.5 (08 Feb 2024 20:00)  T(F): 98.3 (09 Feb 2024 12:00), Max: 99.5 (08 Feb 2024 20:00)  HR: 92 (09 Feb 2024 14:50) (71 - 92)  BP: 132/70 (09 Feb 2024 12:00) (117/62 - 172/90)  BP(mean): 93 (08 Feb 2024 16:00) (93 - 93)  ABP: --  ABP(mean): --  RR: 20 (09 Feb 2024 14:50) (16 - 20)  SpO2: 93% (09 Feb 2024 14:50) (93% - 98%)    O2 Parameters below as of 09 Feb 2024 12:00  Patient On (Oxygen Delivery Method): room air                I&O's Detail    08 Feb 2024 07:01  -  09 Feb 2024 07:00  --------------------------------------------------------  IN:    Oral Fluid: 250 mL  Total IN: 250 mL    OUT:    Voided (mL): 700 mL  Total OUT: 700 mL    Total NET: -450 mL            LABS:                        11.7   7.34  )-----------( 177      ( 09 Feb 2024 07:10 )             34.4     02-09    135  |  104  |  23  ----------------------------<  119<H>  4.1   |  25  |  0.87    Ca    8.6      09 Feb 2024 07:10  Phos  2.6     02-09  Mg     2.0     02-09    TPro  6.7  /  Alb  3.2<L>  /  TBili  1.2  /  DBili  x   /  AST  34  /  ALT  56  /  AlkPhos  59  02-09          CAPILLARY BLOOD GLUCOSE      POCT Blood Glucose.: 128 mg/dL (09 Feb 2024 14:01)      Urinalysis Basic - ( 09 Feb 2024 07:10 )    Color: x / Appearance: x / SG: x / pH: x  Gluc: 119 mg/dL / Ketone: x  / Bili: x / Urobili: x   Blood: x / Protein: x / Nitrite: x   Leuk Esterase: x / RBC: x / WBC x   Sq Epi: x / Non Sq Epi: x / Bacteria: x      CULTURES:  Culture Results:   No growth at 24 hours (02-07-24 @ 23:15)  Culture Results:   No growth at 24 hours (02-07-24 @ 23:15)        Physical Examination:    General: No acute distress.  Alert, oriented, interactive, nonfocal    HEENT: Pupils equal, reactive to light.  Symmetric.    PULM: Clear to auscultation bilaterally, no significant sputum production    CVS: Regular rate and rhythm, no murmurs, rubs, or gallops    ABD: Soft, nondistended, nontender, normoactive bowel sounds, no masses    EXT: No edema, nontender    SKIN: Warm and well perfused, no rashes noted.    NEURO: A&Ox3, strength 5/5 all extremities, cranial nerves grossly intact, no focal deficits        RADIOLOGY: ***        CRITICAL CARE TIME SPENT: ***  Time spent evaluating/treating patient with medical issues that pose a high risk for life threatening deterioration and/or end-organ damage, reviewing data/labs/imaging, discussing case with multidisciplinary team, discussing plan/goals of care with patient/family. Non-inclusive of procedure time.   Patient is a 83y old  Male who presents with a chief complaint of Left sided rib pain and cough (09 Feb 2024 11:51)      BRIEF HOSPITAL COURSE:  82 y/o M with PMHx of arrythmia, diverticulosis, HLD, HTN presents to the ED c/o chest pain/Rib Pain and  cough x1 week. Pt reports that the cough is getting worse and is now having a lot of pain on left side of his rib. Torso movement and deep breathing exacerbates pain. Pt suspects he hurt something during coughing fit. Denies vomiting, but endorses nausea. Pt not on o2. Denies any trauma, palpitations, syncop, abd pain or dyspnea.     Events last 24 hours:  2/9: RRT called on floor patient having SOB, increased work of breathing, and increased agitation. Patient is chronic alcoholic and is having DT's According to family patient has chronic binge drinking and found large quantity of empty Alchohol bottles at pt's home. Patient started on ativan and CIWA protocol today. Patient noted to have significantly worsened agitation and becoming hypoxic, started on Duoneb for suspected COPD/Asthma exacerbation. Patient was agitated pulling off venti mask and increased work of breathing sating 93% patient. CXR show pulmonary edema Admitted to CCU for monitoring and potential airway compromise.         PAST MEDICAL & SURGICAL HISTORY:  Essential hypertension      Pure hypercholesterolemia      Diverticulosis      Arrhythmia      Inverted T wave      S/P ORIF (open reduction internal fixation) fracture  left 1995      S/P tonsillectomy  as a child      History of flexible sigmoidoscopy      History of right inguinal hernia repair      S/P cataract surgery  b/l 2010          Review of Systems:  Unable to assess due to patients increased agitation.     Medications:  azithromycin   Tablet 500 milliGRAM(s) Oral daily  lisinopril 40 milliGRAM(s) Oral daily  metoprolol succinate ER 25 milliGRAM(s) Oral daily  albuterol    90 MICROgram(s) HFA Inhaler 2 Puff(s) Inhalation every 6 hours PRN  albuterol/ipratropium for Nebulization 3 milliLiter(s) Nebulizer once  albuterol/ipratropium for Nebulization. 3 milliLiter(s) Nebulizer once  albuterol/ipratropium for Nebulization. 3 milliLiter(s) Nebulizer once  budesonide 160 MICROgram(s)/formoterol 4.5 MICROgram(s) Inhaler 2 Puff(s) Inhalation two times a day  guaiFENesin  milliGRAM(s) Oral every 12 hours  acetaminophen     Tablet .. 650 milliGRAM(s) Oral every 6 hours PRN  dexMEDEtomidine Infusion 0.2 MICROgram(s)/kG/Hr IV Continuous <Continuous>  LORazepam   Injectable 2 milliGRAM(s) IV Push every 4 hours  LORazepam   Injectable   IV Push   melatonin 3 milliGRAM(s) Oral at bedtime PRN  morphine  - Injectable 2 milliGRAM(s) IV Push every 6 hours PRN  ondansetron Injectable 4 milliGRAM(s) IV Push once PRN  oxyCODONE    IR 10 milliGRAM(s) Oral every 6 hours PRN  traZODone 100 milliGRAM(s) Oral at bedtime  enoxaparin Injectable 40 milliGRAM(s) SubCutaneous every 24 hours  aluminum hydroxide/magnesium hydroxide/simethicone Suspension 30 milliLiter(s) Oral every 4 hours PRN  bisacodyl Suppository 10 milliGRAM(s) Rectal daily PRN  polyethylene glycol 3350 17 Gram(s) Oral every 12 hours  senna 2 Tablet(s) Oral at bedtime  atorvastatin 20 milliGRAM(s) Oral at bedtime  cyanocobalamin 1000 MICROGram(s) Oral daily  folic acid 1 milliGRAM(s) Oral daily  thiamine 100 milliGRAM(s) Oral daily    ICU Vital Signs Last 24 Hrs  T(C): 36.8 (09 Feb 2024 12:00), Max: 37.5 (08 Feb 2024 20:00)  T(F): 98.3 (09 Feb 2024 12:00), Max: 99.5 (08 Feb 2024 20:00)  HR: 92 (09 Feb 2024 14:50) (71 - 92)  BP: 132/70 (09 Feb 2024 12:00) (117/62 - 172/90)  BP(mean): 93 (08 Feb 2024 16:00) (93 - 93)  ABP: --  ABP(mean): --  RR: 20 (09 Feb 2024 14:50) (16 - 20)  SpO2: 93% (09 Feb 2024 14:50) (93% - 98%)    O2 Parameters below as of 09 Feb 2024 12:00  Patient On (Oxygen Delivery Method): room air      I&O's Detail    08 Feb 2024 07:01  -  09 Feb 2024 07:00  --------------------------------------------------------  IN:    Oral Fluid: 250 mL  Total IN: 250 mL    OUT:    Voided (mL): 700 mL  Total OUT: 700 mL    Total NET: -450 mL    LABS:                        11.7   7.34  )-----------( 177      ( 09 Feb 2024 07:10 )             34.4     02-09    135  |  104  |  23  ----------------------------<  119<H>  4.1   |  25  |  0.87    Ca    8.6      09 Feb 2024 07:10  Phos  2.6     02-09  Mg     2.0     02-09    TPro  6.7  /  Alb  3.2<L>  /  TBili  1.2  /  DBili  x   /  AST  34  /  ALT  56  /  AlkPhos  59  02-09          CAPILLARY BLOOD GLUCOSE      POCT Blood Glucose.: 128 mg/dL (09 Feb 2024 14:01)      Urinalysis Basic - ( 09 Feb 2024 07:10 )    Color: x / Appearance: x / SG: x / pH: x  Gluc: 119 mg/dL / Ketone: x  / Bili: x / Urobili: x   Blood: x / Protein: x / Nitrite: x   Leuk Esterase: x / RBC: x / WBC x   Sq Epi: x / Non Sq Epi: x / Bacteria: x      CULTURES:  Culture Results:   No growth at 24 hours (02-07-24 @ 23:15)  Culture Results:   No growth at 24 hours (02-07-24 @ 23:15)        Physical Examination:    General: Acute respiratory distress, diaphoretic, afebrile      HEENT: Pupils equal, reactive to light.  Symmetric.    PULM: Clear to auscultation bilaterally, no significant sputum production    CVS: Regular rate and rhythm, no murmurs, rubs, or gallops    ABD: Soft, distended abdomen, patient agitated and difficult to assess on exam     EXT: No edema, nontender    SKIN: Warm and well perfused, no rashes noted.    NEURO: AOx1, agitated and combative, significant tremors, CIWA unable to fully assess due to agitation         RADIOLOGY:    < from: Xray Chest 1 View- PORTABLE-Urgent (Xray Chest 1 View- PORTABLE-Urgent .) (02.09.24 @ 13:58) >  IMPRESSION:  Stable heart mediastinum. Mild vascular congestion. No focal   consolidation .mild bibasilar atelectatic changes. No pleural collection.   No pneumothorax.    --- End of Report ---    < end of copied text >      CRITICAL CARE TIME SPENT:  60  Time spent evaluating/treating patient with medical issues that pose a high risk for life threatening deterioration and/or end-organ damage, reviewing data/labs/imaging, discussing case with multidisciplinary team, discussing plan/goals of care with patient/family. Non-inclusive of procedure time.   Patient is a 83y old  Male who presents with a chief complaint of Left sided rib pain and cough (09 Feb 2024 11:51)      BRIEF HOSPITAL COURSE:  84 y/o M with PMHx of arrythmia, diverticulosis, HLD, HTN presents to the ED c/o chest pain/Rib Pain and  cough x1 week. Pt reports that the cough is getting worse and is now having a lot of pain on left side of his rib. Torso movement and deep breathing exacerbates pain. Pt suspects he hurt something during coughing fit. Denies vomiting, but endorses nausea. Pt not on o2. Denies any trauma, palpitations, syncope, abd pain or dyspnea.     Events last 24 hours:  2/9: RRT called on floor patient having SOB, increased work of breathing, and increased agitation. Patient is chronic alcoholic and is having DT's According to family patient has chronic binge drinking and found large quantity of empty Alchohol bottles at pt's home. Patient started on ativan and CIWA protocol today. Patient noted to have significantly worsened agitation and becoming hypoxic, started on Duoneb for suspected COPD/Asthma exacerbation. Patient was agitated pulling off venti mask and increased work of breathing sating 93% patient. CXR show pulmonary edema Admitted to CCU for monitoring and potential airway compromise.         PAST MEDICAL & SURGICAL HISTORY:  Essential hypertension      Pure hypercholesterolemia      Diverticulosis      Arrhythmia      Inverted T wave      S/P ORIF (open reduction internal fixation) fracture  left 1995      S/P tonsillectomy  as a child      History of flexible sigmoidoscopy      History of right inguinal hernia repair      S/P cataract surgery  b/l 2010          Review of Systems:  Unable to assess due to patients increased agitation.     Medications:  azithromycin   Tablet 500 milliGRAM(s) Oral daily  lisinopril 40 milliGRAM(s) Oral daily  metoprolol succinate ER 25 milliGRAM(s) Oral daily  albuterol    90 MICROgram(s) HFA Inhaler 2 Puff(s) Inhalation every 6 hours PRN  albuterol/ipratropium for Nebulization 3 milliLiter(s) Nebulizer once  albuterol/ipratropium for Nebulization. 3 milliLiter(s) Nebulizer once  albuterol/ipratropium for Nebulization. 3 milliLiter(s) Nebulizer once  budesonide 160 MICROgram(s)/formoterol 4.5 MICROgram(s) Inhaler 2 Puff(s) Inhalation two times a day  guaiFENesin  milliGRAM(s) Oral every 12 hours  acetaminophen     Tablet .. 650 milliGRAM(s) Oral every 6 hours PRN  dexMEDEtomidine Infusion 0.2 MICROgram(s)/kG/Hr IV Continuous <Continuous>  LORazepam   Injectable 2 milliGRAM(s) IV Push every 4 hours  LORazepam   Injectable   IV Push   melatonin 3 milliGRAM(s) Oral at bedtime PRN  morphine  - Injectable 2 milliGRAM(s) IV Push every 6 hours PRN  ondansetron Injectable 4 milliGRAM(s) IV Push once PRN  oxyCODONE    IR 10 milliGRAM(s) Oral every 6 hours PRN  traZODone 100 milliGRAM(s) Oral at bedtime  enoxaparin Injectable 40 milliGRAM(s) SubCutaneous every 24 hours  aluminum hydroxide/magnesium hydroxide/simethicone Suspension 30 milliLiter(s) Oral every 4 hours PRN  bisacodyl Suppository 10 milliGRAM(s) Rectal daily PRN  polyethylene glycol 3350 17 Gram(s) Oral every 12 hours  senna 2 Tablet(s) Oral at bedtime  atorvastatin 20 milliGRAM(s) Oral at bedtime  cyanocobalamin 1000 MICROGram(s) Oral daily  folic acid 1 milliGRAM(s) Oral daily  thiamine 100 milliGRAM(s) Oral daily    ICU Vital Signs Last 24 Hrs  T(C): 36.8 (09 Feb 2024 12:00), Max: 37.5 (08 Feb 2024 20:00)  T(F): 98.3 (09 Feb 2024 12:00), Max: 99.5 (08 Feb 2024 20:00)  HR: 92 (09 Feb 2024 14:50) (71 - 92)  BP: 132/70 (09 Feb 2024 12:00) (117/62 - 172/90)  BP(mean): 93 (08 Feb 2024 16:00) (93 - 93)  ABP: --  ABP(mean): --  RR: 20 (09 Feb 2024 14:50) (16 - 20)  SpO2: 93% (09 Feb 2024 14:50) (93% - 98%)    O2 Parameters below as of 09 Feb 2024 12:00  Patient On (Oxygen Delivery Method): room air      I&O's Detail    08 Feb 2024 07:01  -  09 Feb 2024 07:00  --------------------------------------------------------  IN:    Oral Fluid: 250 mL  Total IN: 250 mL    OUT:    Voided (mL): 700 mL  Total OUT: 700 mL    Total NET: -450 mL    LABS:                        11.7   7.34  )-----------( 177      ( 09 Feb 2024 07:10 )             34.4     02-09    135  |  104  |  23  ----------------------------<  119<H>  4.1   |  25  |  0.87    Ca    8.6      09 Feb 2024 07:10  Phos  2.6     02-09  Mg     2.0     02-09    TPro  6.7  /  Alb  3.2<L>  /  TBili  1.2  /  DBili  x   /  AST  34  /  ALT  56  /  AlkPhos  59  02-09          CAPILLARY BLOOD GLUCOSE      POCT Blood Glucose.: 128 mg/dL (09 Feb 2024 14:01)      Urinalysis Basic - ( 09 Feb 2024 07:10 )    Color: x / Appearance: x / SG: x / pH: x  Gluc: 119 mg/dL / Ketone: x  / Bili: x / Urobili: x   Blood: x / Protein: x / Nitrite: x   Leuk Esterase: x / RBC: x / WBC x   Sq Epi: x / Non Sq Epi: x / Bacteria: x      CULTURES:  Culture Results:   No growth at 24 hours (02-07-24 @ 23:15)  Culture Results:   No growth at 24 hours (02-07-24 @ 23:15)        Physical Examination:    General: Acute respiratory distress, diaphoretic, afebrile      HEENT: Pupils equal, reactive to light.  Symmetric.    PULM: Clear to auscultation bilaterally, no significant sputum production    CVS: Regular rate and rhythm, no murmurs, rubs, or gallops    ABD: Soft, distended abdomen, patient agitated and difficult to assess on exam     EXT: No edema, nontender    SKIN: Warm and well perfused, no rashes noted.    NEURO: AOx1, agitated and combative, significant tremors, CIWA unable to fully assess due to agitation         RADIOLOGY:    < from: Xray Chest 1 View- PORTABLE-Urgent (Xray Chest 1 View- PORTABLE-Urgent .) (02.09.24 @ 13:58) >  IMPRESSION:  Stable heart mediastinum. Mild vascular congestion. No focal   consolidation .mild bibasilar atelectatic changes. No pleural collection.   No pneumothorax.    --- End of Report ---    < end of copied text >      CRITICAL CARE TIME SPENT:  60  Time spent evaluating/treating patient with medical issues that pose a high risk for life threatening deterioration and/or end-organ damage, reviewing data/labs/imaging, discussing case with multidisciplinary team, discussing plan/goals of care with patient/family. Non-inclusive of procedure time.

## 2024-02-09 NOTE — PROGRESS NOTE ADULT - SUBJECTIVE AND OBJECTIVE BOX
Patient is a 83y old  Male who presents with a chief complaint of Left sided rib pain and cough (08 Feb 2024 15:08)      SUBJECTIVE:   HPI:  82 y/o M with PMHx of arrythmia, diverticulosis, HLD, HTN presents to the ED c/o chest pain/Rib Pain and  cough x1 week. Pt reports that the cough is getting worse and is now having a lot of pain on left side of his rib. Torso movement and deep breathing exacerbates pain. Pt suspects he hurt something during coughing fit. Denies vomiting, but endorses nausea. Pt not on o2. Denies any trauma, palpitations, syncop, abd pain or dyspnea.     In the ER, patient received IVF and Morphine   (08 Feb 2024 09:23)      2/9/24: seen and examined sitting up in chair, c/o left rib pain and coughing w/ difficulty expectorating.        REVIEW OF SYSTEMS:    CONSTITUTIONAL: No weakness, fevers or chills  EYES/ENT: No visual changes;  No vertigo or throat pain   NECK: No pain or stiffness  RESPIRATORY: No cough, wheezing, hemoptysis; No shortness of breath  CARDIOVASCULAR: + anterior chest pain with deep breathing   GASTROINTESTINAL: No abdominal or epigastric pain. No nausea, vomiting, or hematemesis; No diarrhea or constipation. No melena or hematochezia.  GENITOURINARY: No dysuria, frequency or hematuria  NEUROLOGICAL: No numbness or weakness  SKIN: No itching, burning, rashes, or lesions   All other review of systems is negative unless indicated above        Vital Signs Last 24 Hrs  T(C): 37 (09 Feb 2024 07:53), Max: 37.5 (08 Feb 2024 20:00)  T(F): 98.6 (09 Feb 2024 07:53), Max: 99.5 (08 Feb 2024 20:00)  HR: 89 (09 Feb 2024 09:14) (71 - 89)  BP: 168/70 (09 Feb 2024 09:14) (117/62 - 172/90)  BP(mean): 93 (08 Feb 2024 16:00) (93 - 93)  RR: 18 (09 Feb 2024 07:53) (18 - 18)  SpO2: 98% (09 Feb 2024 08:27) (93% - 98%)    Parameters below as of 09 Feb 2024 08:27  Patient On (Oxygen Delivery Method): nasal cannula        I&O's Summary    08 Feb 2024 07:01  -  09 Feb 2024 07:00  --------------------------------------------------------  IN: 250 mL / OUT: 700 mL / NET: -450 mL        CAPILLARY BLOOD GLUCOSE          PHYSICAL EXAM:    Constitutional: NAD, awake and alert, disheveled appearing   HEENT: PERR, EOMI, Normal Hearing, MMM  Neck: Soft and supple, No LAD, No JVD  Respiratory: Breath sounds are clear bilaterally, No wheezing, rales or rhonchi  Cardiovascular: S1 and S2, regular rate and rhythm, no Murmurs, gallops or rubs  Gastrointestinal: Bowel Sounds present, soft, nontender, nondistended, no guarding, no rebound  Extremities: No peripheral edema  Vascular: 2+ peripheral pulses  Neurological: A/O x 3, no focal deficits. slight resting tremor to extremities.   Musculoskeletal: 5/5 strength b/l upper and lower extremities  Skin: No rashes    MEDICATIONS:  MEDICATIONS  (STANDING):  atorvastatin 20 milliGRAM(s) Oral at bedtime  azithromycin   Tablet 500 milliGRAM(s) Oral daily  budesonide 160 MICROgram(s)/formoterol 4.5 MICROgram(s) Inhaler 2 Puff(s) Inhalation two times a day  cyanocobalamin 1000 MICROGram(s) Oral daily  folic acid 1 milliGRAM(s) Oral daily  guaiFENesin  milliGRAM(s) Oral every 12 hours  lisinopril 40 milliGRAM(s) Oral daily  LORazepam   Injectable   IV Push   metoprolol succinate ER 25 milliGRAM(s) Oral daily  polyethylene glycol 3350 17 Gram(s) Oral every 12 hours  senna 2 Tablet(s) Oral at bedtime  sodium chloride 0.9%. 500 milliLiter(s) (100 mL/Hr) IV Continuous <Continuous>  traZODone 100 milliGRAM(s) Oral at bedtime      LABS: All Labs Reviewed:                        11.7   7.34  )-----------( 177      ( 09 Feb 2024 07:10 )             34.4     02-09    135  |  104  |  23  ----------------------------<  119<H>  4.1   |  25  |  0.87    Ca    8.6      09 Feb 2024 07:10  Phos  2.6     02-09  Mg     2.0     02-09    TPro  6.7  /  Alb  3.2<L>  /  TBili  1.2  /  DBili  x   /  AST  34  /  ALT  56  /  AlkPhos  59  02-09      RADIOLOGY/EKG:    < from: CT Chest w/ IV Cont (02.07.24 @ 22:55) >    IMPRESSION:  Indeterminate age nondisplaced left posterior 11th rib fracture.   Otherwise, no evidence of acute traumatic injury to the chest, abdomen,   or pelvis.        --- End of Report ---      RANDALL ALLEN DO; Attending Radiologist  This document has been electronically signed. Feb 7 2024 11:47PM    < end of copied text >

## 2024-02-09 NOTE — CHART NOTE - NSCHARTNOTEFT_GEN_A_CORE
called to bedside by RN for increased confusion, dyspnea, and wheezing    pt admitted for fall, left rib pain w/ 11th rib fracture. worsened by etoh withdrawal requiring standing ativan doses.     per H&P pt w/o dx of COPD or asthma but had cough in ED stated on azithromycin for possible bronchitis which may have lead to worsening resp status.     at bedside pt confused, diaphoretic, attempting to get out of bed. no specific complaints.       ROS unable to obtain ros due to acute confusion     VITALS:  T(F): 98.3 (02-09-24 @ 12:00), Max: 99.5 (02-08-24 @ 20:00)  HR: 75 (02-09-24 @ 12:00) (71 - 89)  BP: 132/70 (02-09-24 @ 12:00) (117/62 - 172/90)  RR: 16 (02-09-24 @ 12:00) (16 - 18)  SpO2: 95% (02-09-24 @ 12:00) (93% - 98%)  Wt(kg): --    I&O's Summary    08 Feb 2024 07:01  -  09 Feb 2024 07:00  --------------------------------------------------------  IN: 250 mL / OUT: 700 mL / NET: -450 mL        CAPILLARY BLOOD GLUCOSE      POCT Blood Glucose.: 128 mg/dL (09 Feb 2024 14:01)      PHYSICAL EXAM:  GENERAL: alert, confused, uncomfortable   HEENT:  pupils equal and reactive, EOMI, no dry mucous membranes   NECK:   supple, no carotid bruits, no palpable lymph nodes, no thyromegaly  CV:  +S1, +S2, tachycardic, regular   RESP:   lungs wheezing throughout all lobes   GI:  abdomen soft, non-tender, + distended, normal BS, no bruits, no abdominal masses, no palpable masses  MSK:   normal muscle tone, no atrophy, no rigidity, no contractions  EXT:  no clubbing, no cyanosis, no edema, no calf pain, swelling or erythema  VASCULAR:  pulses equal and symmetric in the upper and lower extremities  NEURO:  AAOX0, no focal neurological deficits, follows all commands, able to move extremities spontaneously  SKIN:  diaphoretic,         acute resp failure with extensive wheezing and diaphoresis etiology possible flash pulm edema vs aspiration vs start of delirium tremens     ordered for ativan 2mg stat, duoneb x 2, solumedrol 125mg and lasix 80mg. additional 2mg ativan.   stat chest xray.       pt daughter at bedside and updated on plan to upgrade to ICU.  discussed plan with Dr. Castillo and ICU team. called to bedside by RN for increased confusion, dyspnea, and wheezing    pt admitted for fall, left rib pain w/ 11th rib fracture. worsened by etoh withdrawal requiring standing ativan doses.     per H&P pt w/o dx of COPD or asthma but had cough in ED stated on azithromycin for possible bronchitis which may have lead to worsening resp status.     at bedside pt confused, diaphoretic, attempting to get out of bed. no specific complaints.       ROS unable to obtain ros due to acute confusion     VITALS:  T(F): 98.3 (02-09-24 @ 12:00), Max: 99.5 (02-08-24 @ 20:00)  HR: 75 (02-09-24 @ 12:00) (71 - 89)  BP: 132/70 (02-09-24 @ 12:00) (117/62 - 172/90)  RR: 16 (02-09-24 @ 12:00) (16 - 18)  SpO2: 95% (02-09-24 @ 12:00) (93% - 98%)  Wt(kg): --    I&O's Summary    08 Feb 2024 07:01  -  09 Feb 2024 07:00  --------------------------------------------------------  IN: 250 mL / OUT: 700 mL / NET: -450 mL        CAPILLARY BLOOD GLUCOSE      POCT Blood Glucose.: 128 mg/dL (09 Feb 2024 14:01)      PHYSICAL EXAM:  GENERAL: alert, confused, uncomfortable   HEENT:  pupils equal and reactive, EOMI, no dry mucous membranes   NECK:   supple, no carotid bruits, no palpable lymph nodes, no thyromegaly  CV:  +S1, +S2, tachycardic, regular   RESP:   lungs wheezing throughout all lobes   GI:  abdomen soft, non-tender, + distended, normal BS, no bruits, no abdominal masses, no palpable masses  MSK:   normal muscle tone, no atrophy, no rigidity, no contractions  EXT:  no clubbing, no cyanosis, no edema, no calf pain, swelling or erythema  VASCULAR:  pulses equal and symmetric in the upper and lower extremities  NEURO:  AAOX0, no focal neurological deficits, follows all commands, able to move extremities spontaneously  SKIN:  diaphoretic,         acute resp failure with extensive wheezing and diaphoresis etiology possible flash pulm edema vs aspiration vs start of delirium tremens     ordered for ativan 2mg stat, duoneb x 2, solumedrol 125mg and lasix 80mg. additional 2mg ativan.   stat chest xray.       pt daughter at bedside and updated on plan to upgrade to ICU.  discussed plan with Dr. Castillo and ICU team.      critical care time 45 mins including direct patient care, and discussing with family

## 2024-02-09 NOTE — PROVIDER CONTACT NOTE (CHANGE IN STATUS NOTIFICATION) - ACTION/TREATMENT ORDERED:
Respiratory called to 2N. Hospitalist NP notified and aware. NP to evaluate patient
125mg IVP solumederol given. Total 4mg IVP ativan given. CXR performed. 80mg IVP lasix given. BP: 153/102 HR: 118 O2Sat: 96% on nonrebreather. Pt transferred to CCU with rapid response team.

## 2024-02-09 NOTE — PROVIDER CONTACT NOTE (CHANGE IN STATUS NOTIFICATION) - SITUATION
Pt becoming increasingly SOB and agitated. Respiratory and Hospitalist NP at bedside. Rapid response called.

## 2024-02-09 NOTE — PROGRESS NOTE ADULT - NS PANP COMMENT GEN_ALL_CORE FT
Severe ETOH withdrawal  Acute metabolic encephalopathy  [facility] protocol  End tidal CO2 monitoring baseline 25  Family updated at bedside. Wife/HCP on flight returning from [facility]. Spoke to Daughter and Son.

## 2024-02-09 NOTE — PROVIDER CONTACT NOTE (CHANGE IN STATUS NOTIFICATION) - ASSESSMENT
BP: 135/115 HR: 100 RR: 24 O2Sat: 92% on nonrebreather . Unable to get temp due to agitation. BGM: 128

## 2024-02-10 DIAGNOSIS — F10.239 ALCOHOL DEPENDENCE WITH WITHDRAWAL, UNSPECIFIED: ICD-10-CM

## 2024-02-10 LAB
ANION GAP SERPL CALC-SCNC: 6 MMOL/L — SIGNIFICANT CHANGE UP (ref 5–17)
BUN SERPL-MCNC: 24 MG/DL — HIGH (ref 7–23)
CALCIUM SERPL-MCNC: 8.8 MG/DL — SIGNIFICANT CHANGE UP (ref 8.5–10.1)
CHLORIDE SERPL-SCNC: 104 MMOL/L — SIGNIFICANT CHANGE UP (ref 96–108)
CO2 SERPL-SCNC: 25 MMOL/L — SIGNIFICANT CHANGE UP (ref 22–31)
CREAT SERPL-MCNC: 0.8 MG/DL — SIGNIFICANT CHANGE UP (ref 0.5–1.3)
EGFR: 88 ML/MIN/1.73M2 — SIGNIFICANT CHANGE UP
GLUCOSE SERPL-MCNC: 187 MG/DL — HIGH (ref 70–99)
HCT VFR BLD CALC: 34.4 % — LOW (ref 39–50)
HGB BLD-MCNC: 12.2 G/DL — LOW (ref 13–17)
MAGNESIUM SERPL-MCNC: 2.2 MG/DL — SIGNIFICANT CHANGE UP (ref 1.6–2.6)
MCHC RBC-ENTMCNC: 35.5 GM/DL — SIGNIFICANT CHANGE UP (ref 32–36)
MCHC RBC-ENTMCNC: 36.6 PG — HIGH (ref 27–34)
MCV RBC AUTO: 103.3 FL — HIGH (ref 80–100)
PHOSPHATE SERPL-MCNC: 3.7 MG/DL — SIGNIFICANT CHANGE UP (ref 2.5–4.5)
PLATELET # BLD AUTO: 159 K/UL — SIGNIFICANT CHANGE UP (ref 150–400)
POTASSIUM SERPL-MCNC: 3.9 MMOL/L — SIGNIFICANT CHANGE UP (ref 3.5–5.3)
POTASSIUM SERPL-SCNC: 3.9 MMOL/L — SIGNIFICANT CHANGE UP (ref 3.5–5.3)
RBC # BLD: 3.33 M/UL — LOW (ref 4.2–5.8)
RBC # FLD: 12.8 % — SIGNIFICANT CHANGE UP (ref 10.3–14.5)
SODIUM SERPL-SCNC: 135 MMOL/L — SIGNIFICANT CHANGE UP (ref 135–145)
WBC # BLD: 7.87 K/UL — SIGNIFICANT CHANGE UP (ref 3.8–10.5)
WBC # FLD AUTO: 7.87 K/UL — SIGNIFICANT CHANGE UP (ref 3.8–10.5)

## 2024-02-10 PROCEDURE — 99291 CRITICAL CARE FIRST HOUR: CPT

## 2024-02-10 RX ORDER — HYDRALAZINE HCL 50 MG
10 TABLET ORAL EVERY 6 HOURS
Refills: 0 | Status: DISCONTINUED | OUTPATIENT
Start: 2024-02-10 | End: 2024-02-14

## 2024-02-10 RX ADMIN — Medication 1.5 MILLIGRAM(S): at 18:14

## 2024-02-10 RX ADMIN — Medication 1.5 MILLIGRAM(S): at 13:04

## 2024-02-10 RX ADMIN — Medication 1.5 MILLIGRAM(S): at 11:12

## 2024-02-10 RX ADMIN — Medication 1.5 MILLIGRAM(S): at 21:28

## 2024-02-10 RX ADMIN — PIPERACILLIN AND TAZOBACTAM 25 GRAM(S): 4; .5 INJECTION, POWDER, LYOPHILIZED, FOR SOLUTION INTRAVENOUS at 21:29

## 2024-02-10 RX ADMIN — DEXMEDETOMIDINE HYDROCHLORIDE IN 0.9% SODIUM CHLORIDE 4.29 MICROGRAM(S)/KG/HR: 4 INJECTION INTRAVENOUS at 04:08

## 2024-02-10 RX ADMIN — Medication 2 MILLIGRAM(S): at 22:12

## 2024-02-10 RX ADMIN — PIPERACILLIN AND TAZOBACTAM 25 GRAM(S): 4; .5 INJECTION, POWDER, LYOPHILIZED, FOR SOLUTION INTRAVENOUS at 13:08

## 2024-02-10 RX ADMIN — DEXMEDETOMIDINE HYDROCHLORIDE IN 0.9% SODIUM CHLORIDE 4.29 MICROGRAM(S)/KG/HR: 4 INJECTION INTRAVENOUS at 06:51

## 2024-02-10 RX ADMIN — Medication 2 MILLIGRAM(S): at 03:13

## 2024-02-10 RX ADMIN — POLYETHYLENE GLYCOL 3350 17 GRAM(S): 17 POWDER, FOR SOLUTION ORAL at 21:29

## 2024-02-10 RX ADMIN — Medication 1 MILLIGRAM(S): at 11:13

## 2024-02-10 RX ADMIN — ENOXAPARIN SODIUM 40 MILLIGRAM(S): 100 INJECTION SUBCUTANEOUS at 18:14

## 2024-02-10 RX ADMIN — BUDESONIDE AND FORMOTEROL FUMARATE DIHYDRATE 2 PUFF(S): 160; 4.5 AEROSOL RESPIRATORY (INHALATION) at 08:29

## 2024-02-10 RX ADMIN — Medication 100 MILLIGRAM(S): at 11:14

## 2024-02-10 RX ADMIN — BUDESONIDE AND FORMOTEROL FUMARATE DIHYDRATE 2 PUFF(S): 160; 4.5 AEROSOL RESPIRATORY (INHALATION) at 21:50

## 2024-02-10 RX ADMIN — PIPERACILLIN AND TAZOBACTAM 25 GRAM(S): 4; .5 INJECTION, POWDER, LYOPHILIZED, FOR SOLUTION INTRAVENOUS at 06:07

## 2024-02-10 RX ADMIN — Medication 2 MILLIGRAM(S): at 16:16

## 2024-02-10 NOTE — PROGRESS NOTE ADULT - SUBJECTIVE AND OBJECTIVE BOX
RRT called on floor patient having SOB, increased work of breathing, and increased agitation. Patient is chronic alcoholic and is having DT's According to family patient has chronic binge drinking and found large quantity of empty Alchohol bottles at pt's home. Patient started on ativan and CIWA protocol today. Patient noted to have significantly worsened agitation and becoming hypoxic, started on Duoneb for suspected COPD/Asthma exacerbation. Patient was agitated pulling off venti mask and increased work of breathing sating 93% patient. CXR show pulmonary edema Admitted to CCU for monitoring and potential airway compromise.   2/10 Case discussed on CCU rounds, still in alcohol withdrawal.    ICU Vital Signs Last 24 Hrs  T(C): 36 (10 Feb 2024 06:09), Max: 36.4 (09 Feb 2024 16:45)  T(F): 96.8 (10 Feb 2024 06:09), Max: 97.5 (09 Feb 2024 16:45)  HR: 79 (10 Feb 2024 13:00) (47 - 92)  BP: 161/91 (10 Feb 2024 13:00) (104/79 - 178/86)  BP(mean): 112 (10 Feb 2024 13:00) (76 - 121)  RR: 26 (10 Feb 2024 13:00) (13 - 28)  SpO2: 100% (10 Feb 2024 13:00) (93% - 100%)    O2 Parameters below as of 09 Feb 2024 20:00  Patient On (Oxygen Delivery Method): nasal cannula                                12.2   7.87  )-----------( 159      ( 10 Feb 2024 05:28 )             34.4         02-10    135  |  104  |  24<H>  ----------------------------<  187<H>  3.9   |  25  |  0.80    Ca    8.8      10 Feb 2024 05:28  Phos  3.7     02-10  Mg     2.2     02-10    TPro  6.7  /  Alb  3.2<L>  /  TBili  1.2  /  DBili  x   /  AST  34  /  ALT  56  /  AlkPhos  59  02-09    LIVER FUNCTIONS - ( 09 Feb 2024 07:10 )  Alb: 3.2 g/dL / Pro: 6.7 gm/dL / ALK PHOS: 59 U/L / ALT: 56 U/L / AST: 34 U/L / GGT: x

## 2024-02-11 DIAGNOSIS — E87.6 HYPOKALEMIA: ICD-10-CM

## 2024-02-11 LAB
ANION GAP SERPL CALC-SCNC: 5 MMOL/L — SIGNIFICANT CHANGE UP (ref 5–17)
BUN SERPL-MCNC: 25 MG/DL — HIGH (ref 7–23)
CALCIUM SERPL-MCNC: 8.6 MG/DL — SIGNIFICANT CHANGE UP (ref 8.5–10.1)
CHLORIDE SERPL-SCNC: 106 MMOL/L — SIGNIFICANT CHANGE UP (ref 96–108)
CO2 SERPL-SCNC: 26 MMOL/L — SIGNIFICANT CHANGE UP (ref 22–31)
CREAT SERPL-MCNC: 0.77 MG/DL — SIGNIFICANT CHANGE UP (ref 0.5–1.3)
EGFR: 89 ML/MIN/1.73M2 — SIGNIFICANT CHANGE UP
GLUCOSE SERPL-MCNC: 120 MG/DL — HIGH (ref 70–99)
HCT VFR BLD CALC: 34 % — LOW (ref 39–50)
HGB BLD-MCNC: 11.8 G/DL — LOW (ref 13–17)
MAGNESIUM SERPL-MCNC: 2.4 MG/DL — SIGNIFICANT CHANGE UP (ref 1.6–2.6)
MCHC RBC-ENTMCNC: 34.7 GM/DL — SIGNIFICANT CHANGE UP (ref 32–36)
MCHC RBC-ENTMCNC: 36.4 PG — HIGH (ref 27–34)
MCV RBC AUTO: 104.9 FL — HIGH (ref 80–100)
PHOSPHATE SERPL-MCNC: 3.8 MG/DL — SIGNIFICANT CHANGE UP (ref 2.5–4.5)
PLATELET # BLD AUTO: 178 K/UL — SIGNIFICANT CHANGE UP (ref 150–400)
POTASSIUM SERPL-MCNC: 3.4 MMOL/L — LOW (ref 3.5–5.3)
POTASSIUM SERPL-SCNC: 3.4 MMOL/L — LOW (ref 3.5–5.3)
RBC # BLD: 3.24 M/UL — LOW (ref 4.2–5.8)
RBC # FLD: 13.2 % — SIGNIFICANT CHANGE UP (ref 10.3–14.5)
SODIUM SERPL-SCNC: 137 MMOL/L — SIGNIFICANT CHANGE UP (ref 135–145)
WBC # BLD: 7.45 K/UL — SIGNIFICANT CHANGE UP (ref 3.8–10.5)
WBC # FLD AUTO: 7.45 K/UL — SIGNIFICANT CHANGE UP (ref 3.8–10.5)

## 2024-02-11 PROCEDURE — 99291 CRITICAL CARE FIRST HOUR: CPT

## 2024-02-11 PROCEDURE — 99233 SBSQ HOSP IP/OBS HIGH 50: CPT

## 2024-02-11 RX ORDER — MORPHINE SULFATE 50 MG/1
2 CAPSULE, EXTENDED RELEASE ORAL ONCE
Refills: 0 | Status: DISCONTINUED | OUTPATIENT
Start: 2024-02-11 | End: 2024-02-11

## 2024-02-11 RX ORDER — POTASSIUM CHLORIDE 20 MEQ
40 PACKET (EA) ORAL ONCE
Refills: 0 | Status: COMPLETED | OUTPATIENT
Start: 2024-02-11 | End: 2024-02-11

## 2024-02-11 RX ADMIN — Medication 1 MILLIGRAM(S): at 17:45

## 2024-02-11 RX ADMIN — MORPHINE SULFATE 2 MILLIGRAM(S): 50 CAPSULE, EXTENDED RELEASE ORAL at 20:45

## 2024-02-11 RX ADMIN — Medication 1.5 MILLIGRAM(S): at 01:57

## 2024-02-11 RX ADMIN — PIPERACILLIN AND TAZOBACTAM 25 GRAM(S): 4; .5 INJECTION, POWDER, LYOPHILIZED, FOR SOLUTION INTRAVENOUS at 13:13

## 2024-02-11 RX ADMIN — Medication 1 MILLIGRAM(S): at 13:13

## 2024-02-11 RX ADMIN — ENOXAPARIN SODIUM 40 MILLIGRAM(S): 100 INJECTION SUBCUTANEOUS at 17:46

## 2024-02-11 RX ADMIN — Medication 100 MILLIGRAM(S): at 10:48

## 2024-02-11 RX ADMIN — Medication 1 MILLIGRAM(S): at 10:47

## 2024-02-11 RX ADMIN — BUDESONIDE AND FORMOTEROL FUMARATE DIHYDRATE 2 PUFF(S): 160; 4.5 AEROSOL RESPIRATORY (INHALATION) at 20:07

## 2024-02-11 RX ADMIN — PIPERACILLIN AND TAZOBACTAM 25 GRAM(S): 4; .5 INJECTION, POWDER, LYOPHILIZED, FOR SOLUTION INTRAVENOUS at 06:08

## 2024-02-11 RX ADMIN — DEXMEDETOMIDINE HYDROCHLORIDE IN 0.9% SODIUM CHLORIDE 4.29 MICROGRAM(S)/KG/HR: 4 INJECTION INTRAVENOUS at 00:53

## 2024-02-11 RX ADMIN — PIPERACILLIN AND TAZOBACTAM 25 GRAM(S): 4; .5 INJECTION, POWDER, LYOPHILIZED, FOR SOLUTION INTRAVENOUS at 21:16

## 2024-02-11 RX ADMIN — BUDESONIDE AND FORMOTEROL FUMARATE DIHYDRATE 2 PUFF(S): 160; 4.5 AEROSOL RESPIRATORY (INHALATION) at 09:15

## 2024-02-11 RX ADMIN — Medication 40 MILLIEQUIVALENT(S): at 10:49

## 2024-02-11 RX ADMIN — Medication 1 MILLIGRAM(S): at 09:40

## 2024-02-11 RX ADMIN — AZITHROMYCIN 500 MILLIGRAM(S): 500 TABLET, FILM COATED ORAL at 10:48

## 2024-02-11 RX ADMIN — Medication 1 MILLIGRAM(S): at 21:20

## 2024-02-11 RX ADMIN — Medication 1.5 MILLIGRAM(S): at 06:02

## 2024-02-11 NOTE — PROGRESS NOTE ADULT - SUBJECTIVE AND OBJECTIVE BOX
RRT called on floor patient having SOB, increased work of breathing, and increased agitation. Patient is chronic alcoholic and is having DT's According to family patient has chronic binge drinking and found large quantity of empty Alchohol bottles at pt's home. Patient started on ativan and CIWA protocol today. Patient noted to have significantly worsened agitation and becoming hypoxic, started on Duoneb for suspected COPD/Asthma exacerbation. Patient was agitated pulling off venti mask and increased work of breathing sating 93% patient. CXR show pulmonary edema Admitted to CCU for monitoring and potential airway compromise.   2/10 Case discussed on CCU rounds, still in alcohol withdrawal.  2/11 Case discussed on ICU rounds.    ICU Vital Signs Last 24 Hrs  T(C): 37 (11 Feb 2024 16:30), Max: 37.9 (10 Feb 2024 22:08)  T(F): 98.6 (11 Feb 2024 16:30), Max: 100.2 (10 Feb 2024 22:08)  HR: 85 (11 Feb 2024 15:00) (49 - 105)  BP: 129/79 (11 Feb 2024 15:00) (101/55 - 161/74)  BP(mean): 93 (11 Feb 2024 15:00) (70 - 112)  RR: 18 (11 Feb 2024 15:00) (12 - 25)  SpO2: 98% (11 Feb 2024 15:00) (91% - 100%)    O2 Parameters below as of 10 Feb 2024 21:00  Patient On (Oxygen Delivery Method): nasal cannula  O2 Flow (L/min): 3                                11.8   7.45  )-----------( 178      ( 11 Feb 2024 06:05 )             34.0         02-11    137  |  106  |  25<H>  ----------------------------<  120<H>  3.4<L>   |  26  |  0.77    Ca    8.6      11 Feb 2024 06:05  Phos  3.8     02-11  Mg     2.4     02-11

## 2024-02-11 NOTE — PROGRESS NOTE ADULT - ASSESSMENT
A:    83yMale    Here for:    1. Acute alcohol withdrawal/DTs  2. APE 2/2 above, fluid overload  3. Reactive airway  4. Rib fracture    This patient requires critical care for support of one or more vital organ systems with a high probability of imminent or life threatening deterioration in his/her condition    P:    DTs refractory to conventional mgmt  APE suspect 2/2 excessive sympathetic response from etoh withdrawal    ATC ativan + PRN CIWA, precedex as adjunct for breakthough agitation; actively titrating and managing these medications  s/p lasix, resp status im,proved; PRN oxygen therapy and NIPPV as indicated  No known Hx of asthma or COPD, suspecu reactive airway from catecholamine response in withdrawal, improving  HD monitoring  Diet  VTE ppx  Zosyn for empiric coverage for possible PNA  f/u labs, replete lytes PRN    Dispo: Cont critical care.    Date of entry of this note is equal to the date of services rendered on 2/11/2024    TOTAL CRITICAL CARE TIME:  38 minutes (EXCLUSIVE of any non bundled procedures)    Note: This is a critically ill patient. Time spent has been in salvage of life, limb and vital organ systems. This time INCLUDES time spent directly as this patient's bedside with evaluation and management, review of chart including review of laboratory and imaging studies, interpretation of vital signs and cardiac output measurements, any necessary ventilator management, and time spent discussing plan of care with patient and family, including goals of care discussion. This also includes time spent in multidisciplinary discussion with care team and various consultants to optimize treatment plan. This time may NOT include various procedures, which will be noted seperately.

## 2024-02-11 NOTE — PROGRESS NOTE ADULT - SUBJECTIVE AND OBJECTIVE BOX
CCU Progress Note    HPI:    S:    Pt seen and examined  84 y/o M with PMHx of arrythmia, diverticulosis, HLD, HTN presents to the ED c/o chest pain/Rib Pain and  cough x1 week. Pt reports that the cough is getting worse and is now having a lot of pain on left side of his rib. Torso movement and deep breathing exacerbates pain. Pt suspects he hurt something during coughing fit. Denies vomiting, but endorses nausea. Pt not on o2. Denies any trauma, palpitations, syncope, abd pain or dyspnea.     Upgraded to CCU 2/9 for alcohol withdrawal    2/11: On precedex and ativan remains in DTs. On abx.    ROS: Unable to obtain 2/2 status (confused, altered)        Allergies    No Known Allergies    Intolerances        MEDICATIONS  (STANDING):  atorvastatin 20 milliGRAM(s) Oral at bedtime  budesonide 160 MICROgram(s)/formoterol 4.5 MICROgram(s) Inhaler 2 Puff(s) Inhalation two times a day  cyanocobalamin 1000 MICROGram(s) Oral daily  dexMEDEtomidine Infusion 0.5 MICROgram(s)/kG/Hr (10.7 mL/Hr) IV Continuous <Continuous>  enoxaparin Injectable 40 milliGRAM(s) SubCutaneous every 24 hours  folic acid Injectable 1 milliGRAM(s) IV Push daily  lisinopril 20 milliGRAM(s) Oral daily  LORazepam   Injectable 2 milliGRAM(s) IV Push every 4 hours  metoprolol succinate ER 25 milliGRAM(s) Oral daily  thiamine Injectable 100 milliGRAM(s) IV Push daily  traZODone 50 milliGRAM(s) Oral at bedtime    MEDICATIONS  (PRN):  acetaminophen     Tablet .. 650 milliGRAM(s) Oral every 6 hours PRN Temp greater or equal to 38C (100.4F), Mild Pain (1 - 3)  albuterol    90 MICROgram(s) HFA Inhaler 2 Puff(s) Inhalation every 6 hours PRN Shortness of Breath and/or Wheezing  aluminum hydroxide/magnesium hydroxide/simethicone Suspension 30 milliLiter(s) Oral every 4 hours PRN Dyspepsia  bisacodyl Suppository 10 milliGRAM(s) Rectal daily PRN Constipation  hydrALAZINE Injectable 10 milliGRAM(s) IV Push every 6 hours PRN SBP> 140  LORazepam   Injectable 2 milliGRAM(s) IV Push every 2 hours PRN Symptom-triggered: 2 point increase in CIWA -Ar score and a total score of 7 or LESS  ondansetron Injectable 4 milliGRAM(s) IV Push once PRN Nausea and/or Vomiting      Drug Dosing Weight  Height (cm): 170.2 (08 Feb 2024 09:26)  Weight (kg): 85.8 (08 Feb 2024 09:26)  BMI (kg/m2): 29.6 (08 Feb 2024 09:26)  BSA (m2): 1.97 (08 Feb 2024 09:26)      PAST MEDICAL & SURGICAL HISTORY:  Essential hypertension      Pure hypercholesterolemia      Diverticulosis      Arrhythmia      Inverted T wave      S/P ORIF (open reduction internal fixation) fracture  left 1995      S/P tonsillectomy  as a child      History of flexible sigmoidoscopy      History of right inguinal hernia repair      S/P cataract surgery  b/l 2010          FAMILY HISTORY:  Family history of cancer in mother (Mother)          REVIEW OF SYSTEMS    UNLESS OTHERWISE NOTED IN HPI above:    Constitutional:  No Weight Change, No Fever, No Chills, No Night Sweats, No Fatigue, No Malaise  ENT/Mouth:  No Hearing Changes, No Ear Pain, No Nasal Congestion, No  Sinus Pain, No Hoarseness, No sore throat, No Rhinorrhea, No Swallowing  Difficulty  Eyes:  No Eye Pain, No Swelling, No Redness, No Foreign Body, No Discharge, No Vision Changes  Cardiovascular:  No Chest Pain, No SOB, No PND, No Dyspnea on Exertion,  No Orthopnea, No Claudication, No Edema, No Palpitations  Respiratory:  No Cough, No Sputum, No Wheezing, No Smoke Exposure, No Dyspnea  Gastrointestinal:  No Nausea, No Vomiting, No Diarrhea, No  Constipation, No Pain, No Heartburn, No Anorexia, No Dysphagia, No  Hematochezia, No Melena, No Flatulence, No Jaundice  Genitourinary:  No Dysmenorrhea, No DUB, No Dyspareunia, No Dysuria, No  Urinary Frequency, No Hematuria, No Urinary Incontinence, No Urgency,  No Flank Pain, No Urinary Flow Changes, No Hesitancy  Musculoskeletal:  No Arthralgias, No Myalgias, No Joint Swelling, No  Joint Stiffness, No Back Pain, No Neck Pain, No Injury History  Skin:  No Skin Lesions, No Pruritis, No Hair Changes, No Breast/Skin Changes, No Nipple Discharge  Neuro:  No Weakness, No Numbness, No Paresthesias, No Loss of  Consciousness, No Syncope, No Dizziness, No Headache, No Coordination  Changes, No Recent Falls  Psych:  No Anxiety/Panic, No Depression, No Insomnia, No Personality  Changes, No Delusions, No Rumination, No SI/HI/AH/VH, No Social Issues,  No Memory Changes, No Violence/Abuse Hx., No Eating Concerns  Heme/Lymph:  No Bruising, No Bleeding, No Transfusions History, No Lymphadenopathy  Endocrine:  No Polyuria, No Polydipsia, No Temperature Intolerance    O:    ICU Vital Signs Last 24 Hrs  T(C): 36.7 (13 Feb 2024 04:37), Max: 37.1 (13 Feb 2024 00:03)  T(F): 98.1 (13 Feb 2024 04:37), Max: 98.8 (13 Feb 2024 00:03)  HR: 46 (13 Feb 2024 05:00) (46 - 99)  BP: 146/79 (13 Feb 2024 05:00) (112/60 - 185/91)  BP(mean): 100 (13 Feb 2024 05:00) (76 - 124)  ABP: --  ABP(mean): --  RR: 13 (13 Feb 2024 05:00) (13 - 31)  SpO2: 97% (13 Feb 2024 05:00) (86% - 100%)    O2 Parameters below as of 12 Feb 2024 22:49  Patient On (Oxygen Delivery Method): nasal cannula, high flow  O2 Flow (L/min): 30  O2 Concentration (%): 100            I&O's Detail    12 Feb 2024 07:01  -  13 Feb 2024 07:00  --------------------------------------------------------  IN:    Oral Fluid: 800 mL  Total IN: 800 mL    OUT:    Indwelling Catheter - Urethral (mL): 780 mL  Total OUT: 780 mL    Total NET: 20 mL              PE:    Adult lying in bed  No JVD trachea midline  Normocephalic, atraumatic  S1S2+  CTA B/L  Abd soft NTND  No leg swelling/edema noted  Confused and altered  Skin pink, warm    LABS:    CBC Full  -  ( 13 Feb 2024 06:22 )  WBC Count : 6.46 K/uL  RBC Count : 3.44 M/uL  Hemoglobin : 12.5 g/dL  Hematocrit : 35.8 %  Platelet Count - Automated : 209 K/uL  Mean Cell Volume : 104.1 fl  Mean Cell Hemoglobin : 36.3 pg  Mean Cell Hemoglobin Concentration : 34.9 gm/dL  Auto Neutrophil # : x  Auto Lymphocyte # : x  Auto Monocyte # : x  Auto Eosinophil # : x  Auto Basophil # : x  Auto Neutrophil % : x  Auto Lymphocyte % : x  Auto Monocyte % : x  Auto Eosinophil % : x  Auto Basophil % : x    02-13    138  |  109<H>  |  23  ----------------------------<  119<H>  4.1   |  24  |  0.70    Ca    9.1      13 Feb 2024 06:22  Phos  3.6     02-13  Mg     2.5     02-13    TPro  6.6  /  Alb  2.8<L>  /  TBili  0.8  /  DBili  x   /  AST  19  /  ALT  35  /  AlkPhos  53  02-12      Urinalysis Basic - ( 13 Feb 2024 06:22 )    Color: x / Appearance: x / SG: x / pH: x  Gluc: 119 mg/dL / Ketone: x  / Bili: x / Urobili: x   Blood: x / Protein: x / Nitrite: x   Leuk Esterase: x / RBC: x / WBC x   Sq Epi: x / Non Sq Epi: x / Bacteria: x      CAPILLARY BLOOD GLUCOSE            LIVER FUNCTIONS - ( 12 Feb 2024 05:41 )  Alb: 2.8 g/dL / Pro: 6.6 gm/dL / ALK PHOS: 53 U/L / ALT: 35 U/L / AST: 19 U/L / GGT: x

## 2024-02-12 LAB
ALBUMIN SERPL ELPH-MCNC: 2.8 G/DL — LOW (ref 3.3–5)
ALP SERPL-CCNC: 53 U/L — SIGNIFICANT CHANGE UP (ref 40–120)
ALT FLD-CCNC: 35 U/L — SIGNIFICANT CHANGE UP (ref 12–78)
ANION GAP SERPL CALC-SCNC: 5 MMOL/L — SIGNIFICANT CHANGE UP (ref 5–17)
AST SERPL-CCNC: 19 U/L — SIGNIFICANT CHANGE UP (ref 15–37)
BASOPHILS # BLD AUTO: 0.04 K/UL — SIGNIFICANT CHANGE UP (ref 0–0.2)
BASOPHILS NFR BLD AUTO: 0.5 % — SIGNIFICANT CHANGE UP (ref 0–2)
BILIRUB SERPL-MCNC: 0.8 MG/DL — SIGNIFICANT CHANGE UP (ref 0.2–1.2)
BUN SERPL-MCNC: 23 MG/DL — SIGNIFICANT CHANGE UP (ref 7–23)
CALCIUM SERPL-MCNC: 8.6 MG/DL — SIGNIFICANT CHANGE UP (ref 8.5–10.1)
CHLORIDE SERPL-SCNC: 106 MMOL/L — SIGNIFICANT CHANGE UP (ref 96–108)
CO2 SERPL-SCNC: 27 MMOL/L — SIGNIFICANT CHANGE UP (ref 22–31)
CREAT SERPL-MCNC: 0.82 MG/DL — SIGNIFICANT CHANGE UP (ref 0.5–1.3)
EGFR: 88 ML/MIN/1.73M2 — SIGNIFICANT CHANGE UP
EOSINOPHIL # BLD AUTO: 0.25 K/UL — SIGNIFICANT CHANGE UP (ref 0–0.5)
EOSINOPHIL NFR BLD AUTO: 3 % — SIGNIFICANT CHANGE UP (ref 0–6)
GLUCOSE SERPL-MCNC: 113 MG/DL — HIGH (ref 70–99)
HCT VFR BLD CALC: 35.4 % — LOW (ref 39–50)
HGB BLD-MCNC: 12.3 G/DL — LOW (ref 13–17)
IMM GRANULOCYTES NFR BLD AUTO: 0.2 % — SIGNIFICANT CHANGE UP (ref 0–0.9)
LYMPHOCYTES # BLD AUTO: 1.77 K/UL — SIGNIFICANT CHANGE UP (ref 1–3.3)
LYMPHOCYTES # BLD AUTO: 21.4 % — SIGNIFICANT CHANGE UP (ref 13–44)
MAGNESIUM SERPL-MCNC: 2.3 MG/DL — SIGNIFICANT CHANGE UP (ref 1.6–2.6)
MCHC RBC-ENTMCNC: 34.7 GM/DL — SIGNIFICANT CHANGE UP (ref 32–36)
MCHC RBC-ENTMCNC: 36.4 PG — HIGH (ref 27–34)
MCV RBC AUTO: 104.7 FL — HIGH (ref 80–100)
MONOCYTES # BLD AUTO: 1.13 K/UL — HIGH (ref 0–0.9)
MONOCYTES NFR BLD AUTO: 13.7 % — SIGNIFICANT CHANGE UP (ref 2–14)
NEUTROPHILS # BLD AUTO: 5.05 K/UL — SIGNIFICANT CHANGE UP (ref 1.8–7.4)
NEUTROPHILS NFR BLD AUTO: 61.2 % — SIGNIFICANT CHANGE UP (ref 43–77)
PHOSPHATE SERPL-MCNC: 2.8 MG/DL — SIGNIFICANT CHANGE UP (ref 2.5–4.5)
PLATELET # BLD AUTO: 228 K/UL — SIGNIFICANT CHANGE UP (ref 150–400)
POTASSIUM SERPL-MCNC: 3.4 MMOL/L — LOW (ref 3.5–5.3)
POTASSIUM SERPL-SCNC: 3.4 MMOL/L — LOW (ref 3.5–5.3)
PROT SERPL-MCNC: 6.6 GM/DL — SIGNIFICANT CHANGE UP (ref 6–8.3)
RBC # BLD: 3.38 M/UL — LOW (ref 4.2–5.8)
RBC # FLD: 13.2 % — SIGNIFICANT CHANGE UP (ref 10.3–14.5)
SODIUM SERPL-SCNC: 138 MMOL/L — SIGNIFICANT CHANGE UP (ref 135–145)
WBC # BLD: 8.26 K/UL — SIGNIFICANT CHANGE UP (ref 3.8–10.5)
WBC # FLD AUTO: 8.26 K/UL — SIGNIFICANT CHANGE UP (ref 3.8–10.5)

## 2024-02-12 PROCEDURE — 71045 X-RAY EXAM CHEST 1 VIEW: CPT | Mod: 26

## 2024-02-12 PROCEDURE — 99233 SBSQ HOSP IP/OBS HIGH 50: CPT

## 2024-02-12 RX ORDER — MORPHINE SULFATE 50 MG/1
2 CAPSULE, EXTENDED RELEASE ORAL ONCE
Refills: 0 | Status: DISCONTINUED | OUTPATIENT
Start: 2024-02-12 | End: 2024-02-12

## 2024-02-12 RX ORDER — LISINOPRIL 2.5 MG/1
20 TABLET ORAL DAILY
Refills: 0 | Status: DISCONTINUED | OUTPATIENT
Start: 2024-02-12 | End: 2024-02-14

## 2024-02-12 RX ORDER — POTASSIUM CHLORIDE 20 MEQ
40 PACKET (EA) ORAL ONCE
Refills: 0 | Status: COMPLETED | OUTPATIENT
Start: 2024-02-12 | End: 2024-02-12

## 2024-02-12 RX ORDER — TRAZODONE HCL 50 MG
50 TABLET ORAL AT BEDTIME
Refills: 0 | Status: DISCONTINUED | OUTPATIENT
Start: 2024-02-12 | End: 2024-02-18

## 2024-02-12 RX ORDER — METOPROLOL TARTRATE 50 MG
25 TABLET ORAL DAILY
Refills: 0 | Status: DISCONTINUED | OUTPATIENT
Start: 2024-02-12 | End: 2024-02-14

## 2024-02-12 RX ORDER — PREGABALIN 225 MG/1
1000 CAPSULE ORAL DAILY
Refills: 0 | Status: DISCONTINUED | OUTPATIENT
Start: 2024-02-12 | End: 2024-02-14

## 2024-02-12 RX ORDER — ATORVASTATIN CALCIUM 80 MG/1
20 TABLET, FILM COATED ORAL AT BEDTIME
Refills: 0 | Status: DISCONTINUED | OUTPATIENT
Start: 2024-02-12 | End: 2024-02-21

## 2024-02-12 RX ORDER — DEXMEDETOMIDINE HYDROCHLORIDE IN 0.9% SODIUM CHLORIDE 4 UG/ML
0.5 INJECTION INTRAVENOUS
Qty: 200 | Refills: 0 | Status: DISCONTINUED | OUTPATIENT
Start: 2024-02-12 | End: 2024-02-13

## 2024-02-12 RX ADMIN — Medication 2 MILLIGRAM(S): at 03:38

## 2024-02-12 RX ADMIN — Medication 1 MILLIGRAM(S): at 05:29

## 2024-02-12 RX ADMIN — Medication 1 MILLIGRAM(S): at 10:57

## 2024-02-12 RX ADMIN — Medication 100 MILLIGRAM(S): at 10:57

## 2024-02-12 RX ADMIN — MORPHINE SULFATE 2 MILLIGRAM(S): 50 CAPSULE, EXTENDED RELEASE ORAL at 01:44

## 2024-02-12 RX ADMIN — Medication 2 MILLIGRAM(S): at 21:51

## 2024-02-12 RX ADMIN — Medication 2 MILLIGRAM(S): at 14:14

## 2024-02-12 RX ADMIN — ENOXAPARIN SODIUM 40 MILLIGRAM(S): 100 INJECTION SUBCUTANEOUS at 18:16

## 2024-02-12 RX ADMIN — Medication 2 MILLIGRAM(S): at 10:56

## 2024-02-12 RX ADMIN — AZITHROMYCIN 500 MILLIGRAM(S): 500 TABLET, FILM COATED ORAL at 10:57

## 2024-02-12 RX ADMIN — PIPERACILLIN AND TAZOBACTAM 25 GRAM(S): 4; .5 INJECTION, POWDER, LYOPHILIZED, FOR SOLUTION INTRAVENOUS at 05:27

## 2024-02-12 RX ADMIN — Medication 40 MILLIEQUIVALENT(S): at 10:55

## 2024-02-12 RX ADMIN — Medication 25 MILLIGRAM(S): at 17:15

## 2024-02-12 RX ADMIN — BUDESONIDE AND FORMOTEROL FUMARATE DIHYDRATE 2 PUFF(S): 160; 4.5 AEROSOL RESPIRATORY (INHALATION) at 08:27

## 2024-02-12 RX ADMIN — BUDESONIDE AND FORMOTEROL FUMARATE DIHYDRATE 2 PUFF(S): 160; 4.5 AEROSOL RESPIRATORY (INHALATION) at 20:28

## 2024-02-12 RX ADMIN — LISINOPRIL 20 MILLIGRAM(S): 2.5 TABLET ORAL at 17:15

## 2024-02-12 RX ADMIN — Medication 2 MILLIGRAM(S): at 18:15

## 2024-02-12 RX ADMIN — Medication 1 MILLIGRAM(S): at 01:19

## 2024-02-12 NOTE — PROGRESS NOTE ADULT - ASSESSMENT
A:    83yMale    Here for:    1. Acute alcohol withdrawal/DTs  2. APE 2/2 above, fluid overload  3. Reactive airway  4. Rib fracture    This patient requires a higher level of care for support of one or more vital organ systems with a probability of  deterioration in his/her condition    P:    DTs refractory to conventional mgmt  APE suspect 2/2 excessive sympathetic response from etoh withdrawal    ATC ativan + PRN CIWA, precedex as adjunct for breakthough agitation; actively titrating and managing these medications  s/p lasix, resp status im,proved; PRN oxygen therapy and NIPPV as indicated  No known Hx of asthma or COPD, suspecu reactive airway from catecholamine response in withdrawal, improving  HD monitoring  Diet  VTE ppx  Zosyn for empiric coverage for possible PNA  f/u labs, replete lytes PRN    Dispo: Cont critical care.    Date of entry of this note is equal to the date of services rendered on 2/12/2024    TOTAL CARE TIME:  50+ minutes (EXCLUSIVE of any non bundled procedures)    Note: This time INCLUDES time spent directly as this patient's bedside with evaluation and management, review of chart including review of laboratory and imaging studies, interpretation of vital signs and cardiac output measurements, any necessary ventilator management, and time spent discussing plan of care with patient and family, including goals of care discussion. This also includes time spent in multidisciplinary discussion with care team and various consultants to optimize treatment plan. This time may NOT include various procedures, which will be noted seperately.

## 2024-02-12 NOTE — PROGRESS NOTE ADULT - SUBJECTIVE AND OBJECTIVE BOX
CCU Progress Note    HPI:    S:    Pt seen and examined  84 y/o M with PMHx of arrythmia, diverticulosis, HLD, HTN presents to the ED c/o chest pain/Rib Pain and  cough x1 week. Pt reports that the cough is getting worse and is now having a lot of pain on left side of his rib. Torso movement and deep breathing exacerbates pain. Pt suspects he hurt something during coughing fit. Denies vomiting, but endorses nausea. Pt not on o2. Denies any trauma, palpitations, syncope, abd pain or dyspnea.     Upgraded to CCU 2/9 for alcohol withdrawal    2/11: On precedex and ativan remains in DTs. On abx.  2/12: Remains in DTs, on off then on precedex, BZD therapy.     ROS: Unable to obtain 2/2 status (confused, altered)        Allergies    No Known Allergies    Intolerances        MEDICATIONS  (STANDING):  atorvastatin 20 milliGRAM(s) Oral at bedtime  budesonide 160 MICROgram(s)/formoterol 4.5 MICROgram(s) Inhaler 2 Puff(s) Inhalation two times a day  cyanocobalamin 1000 MICROGram(s) Oral daily  enoxaparin Injectable 40 milliGRAM(s) SubCutaneous every 24 hours  folic acid Injectable 1 milliGRAM(s) IV Push daily  lisinopril 20 milliGRAM(s) Oral daily  LORazepam   Injectable 3 milliGRAM(s) IV Push every 4 hours  metoprolol succinate ER 25 milliGRAM(s) Oral daily  thiamine Injectable 100 milliGRAM(s) IV Push daily  traZODone 50 milliGRAM(s) Oral at bedtime    MEDICATIONS  (PRN):  acetaminophen     Tablet .. 650 milliGRAM(s) Oral every 6 hours PRN Temp greater or equal to 38C (100.4F), Mild Pain (1 - 3)  albuterol    90 MICROgram(s) HFA Inhaler 2 Puff(s) Inhalation every 6 hours PRN Shortness of Breath and/or Wheezing  aluminum hydroxide/magnesium hydroxide/simethicone Suspension 30 milliLiter(s) Oral every 4 hours PRN Dyspepsia  bisacodyl Suppository 10 milliGRAM(s) Rectal daily PRN Constipation  hydrALAZINE Injectable 10 milliGRAM(s) IV Push every 6 hours PRN SBP> 140  LORazepam   Injectable 3 milliGRAM(s) IV Push every 2 hours PRN agitation, CIWA > 10  ondansetron Injectable 4 milliGRAM(s) IV Push once PRN Nausea and/or Vomiting      Drug Dosing Weight  Height (cm): 170.2 (08 Feb 2024 09:26)  Weight (kg): 85.8 (08 Feb 2024 09:26)  BMI (kg/m2): 29.6 (08 Feb 2024 09:26)  BSA (m2): 1.97 (08 Feb 2024 09:26)      PAST MEDICAL & SURGICAL HISTORY:  Essential hypertension      Pure hypercholesterolemia      Diverticulosis      Arrhythmia      Inverted T wave      S/P ORIF (open reduction internal fixation) fracture  left 1995      S/P tonsillectomy  as a child      History of flexible sigmoidoscopy      History of right inguinal hernia repair      S/P cataract surgery  b/l 2010          FAMILY HISTORY:  Family history of cancer in mother (Mother)          REVIEW OF SYSTEMS    UNLESS OTHERWISE NOTED IN HPI above:    Constitutional:  No Weight Change, No Fever, No Chills, No Night Sweats, No Fatigue, No Malaise  ENT/Mouth:  No Hearing Changes, No Ear Pain, No Nasal Congestion, No  Sinus Pain, No Hoarseness, No sore throat, No Rhinorrhea, No Swallowing  Difficulty  Eyes:  No Eye Pain, No Swelling, No Redness, No Foreign Body, No Discharge, No Vision Changes  Cardiovascular:  No Chest Pain, No SOB, No PND, No Dyspnea on Exertion,  No Orthopnea, No Claudication, No Edema, No Palpitations  Respiratory:  No Cough, No Sputum, No Wheezing, No Smoke Exposure, No Dyspnea  Gastrointestinal:  No Nausea, No Vomiting, No Diarrhea, No  Constipation, No Pain, No Heartburn, No Anorexia, No Dysphagia, No  Hematochezia, No Melena, No Flatulence, No Jaundice  Genitourinary:  No Dysmenorrhea, No DUB, No Dyspareunia, No Dysuria, No  Urinary Frequency, No Hematuria, No Urinary Incontinence, No Urgency,  No Flank Pain, No Urinary Flow Changes, No Hesitancy  Musculoskeletal:  No Arthralgias, No Myalgias, No Joint Swelling, No  Joint Stiffness, No Back Pain, No Neck Pain, No Injury History  Skin:  No Skin Lesions, No Pruritis, No Hair Changes, No Breast/Skin Changes, No Nipple Discharge  Neuro:  No Weakness, No Numbness, No Paresthesias, No Loss of  Consciousness, No Syncope, No Dizziness, No Headache, No Coordination  Changes, No Recent Falls  Psych:  No Anxiety/Panic, No Depression, No Insomnia, No Personality  Changes, No Delusions, No Rumination, No SI/HI/AH/VH, No Social Issues,  No Memory Changes, No Violence/Abuse Hx., No Eating Concerns  Heme/Lymph:  No Bruising, No Bleeding, No Transfusions History, No Lymphadenopathy  Endocrine:  No Polyuria, No Polydipsia, No Temperature Intolerance    O:    ICU Vital Signs Last 24 Hrs  T(C): 36.1 (13 Feb 2024 10:23), Max: 37.1 (13 Feb 2024 00:03)  T(F): 97 (13 Feb 2024 10:23), Max: 98.8 (13 Feb 2024 00:03)  HR: 63 (13 Feb 2024 17:00) (46 - 89)  BP: 151/72 (13 Feb 2024 17:00) (112/60 - 183/84)  BP(mean): 96 (13 Feb 2024 17:00) (76 - 124)  ABP: --  ABP(mean): --  RR: 22 (13 Feb 2024 17:00) (13 - 31)  SpO2: 98% (13 Feb 2024 17:00) (90% - 98%)    O2 Parameters below as of 12 Feb 2024 22:49  Patient On (Oxygen Delivery Method): nasal cannula, high flow  O2 Flow (L/min): 30  O2 Concentration (%): 100            I&O's Detail    12 Feb 2024 07:01  -  13 Feb 2024 07:00  --------------------------------------------------------  IN:    Oral Fluid: 800 mL  Total IN: 800 mL    OUT:    Indwelling Catheter - Urethral (mL): 780 mL  Total OUT: 780 mL    Total NET: 20 mL      13 Feb 2024 07:01  -  13 Feb 2024 17:41  --------------------------------------------------------  IN:    Oral Fluid: 300 mL  Total IN: 300 mL    OUT:    Voided (mL): 200 mL  Total OUT: 200 mL    Total NET: 100 mL          PE:    Adult lying in bed  No JVD trachea midline  Normocephalic, atraumatic  S1S2+  CTA B/L  Abd soft NTND  No leg swelling/edema noted  Confused and altered  Skin pink, warm    LABS:    CBC Full  -  ( 13 Feb 2024 06:22 )  WBC Count : 6.46 K/uL  RBC Count : 3.44 M/uL  Hemoglobin : 12.5 g/dL  Hematocrit : 35.8 %  Platelet Count - Automated : 209 K/uL  Mean Cell Volume : 104.1 fl  Mean Cell Hemoglobin : 36.3 pg  Mean Cell Hemoglobin Concentration : 34.9 gm/dL  Auto Neutrophil # : x  Auto Lymphocyte # : x  Auto Monocyte # : x  Auto Eosinophil # : x  Auto Basophil # : x  Auto Neutrophil % : x  Auto Lymphocyte % : x  Auto Monocyte % : x  Auto Eosinophil % : x  Auto Basophil % : x    02-13    138  |  109<H>  |  23  ----------------------------<  119<H>  4.1   |  24  |  0.70    Ca    9.1      13 Feb 2024 06:22  Phos  3.6     02-13  Mg     2.5     02-13    TPro  6.6  /  Alb  2.8<L>  /  TBili  0.8  /  DBili  x   /  AST  19  /  ALT  35  /  AlkPhos  53  02-12      Urinalysis Basic - ( 13 Feb 2024 06:22 )    Color: x / Appearance: x / SG: x / pH: x  Gluc: 119 mg/dL / Ketone: x  / Bili: x / Urobili: x   Blood: x / Protein: x / Nitrite: x   Leuk Esterase: x / RBC: x / WBC x   Sq Epi: x / Non Sq Epi: x / Bacteria: x      CAPILLARY BLOOD GLUCOSE            LIVER FUNCTIONS - ( 12 Feb 2024 05:41 )  Alb: 2.8 g/dL / Pro: 6.6 gm/dL / ALK PHOS: 53 U/L / ALT: 35 U/L / AST: 19 U/L / GGT: x

## 2024-02-12 NOTE — PROGRESS NOTE ADULT - ASSESSMENT
Colin 82 y/o male PMH ETOH abuse, HTN, HLD, arrythmia     Initially admitted for L rib pain, cough x 1 week PTA, suspected trauma  CT with L 11th rib fx    Subsequently developed DTs and acute pulmonary edema     Transferred to CCU 2/9   Required Precedex and diuresis       Plan:     Continues to have ETOH withdrawal symptoms with delirium   Will increase Ativan to 2 mg q4   Continue thiamine, FA  Resume trazodone    Hypertensive - resume lisinopril, Toprol  Resume statin     No obvious infectious s/s, febrile, WBC normal   Will d/c zosyn and monitor off abx     D/c Colin     PO diet     DVT ppx with lovenox       Keep in CCU today, likely transfer to floor 2/13 if stable  82 y/o male PMH ETOH abuse, HTN, HLD, arrythmia     Initially admitted for L rib pain, cough x 1 week PTA, suspected trauma  CT with L 11th rib fx    Subsequently developed DTs and acute pulmonary edema     Transferred to CCU 2/9   Required Precedex and diuresis       Plan:     Continues to have ETOH withdrawal symptoms with delirium   Will increase Ativan to 2 mg q4   Continue thiamine, FA  Resume trazodone    Hypertensive - resume lisinopril, Toprol  Resume statin     Resp stable on NC  Continue incentive spirometry     No obvious infectious s/s, febrile, WBC normal   Will d/c zosyn and monitor off abx     D/c Colin     PO diet     DVT ppx with lovenox       Keep in CCU today, likely transfer to floor 2/13 if stable  84 y/o male PMH ETOH abuse, HTN, HLD, arrythmia     Initially admitted for L rib pain, cough x 1 week PTA, suspected trauma  CT with L 11th rib fx    Subsequently developed DTs and acute pulmonary edema     Transferred to CCU 2/9   Required Precedex and diuresis       Plan:     Continues to have ETOH withdrawal symptoms with delirium   Will increase Ativan to 2 mg q4   Continue thiamine, FA  Resume trazodone    Hypertensive - resume lisinopril, Toprol  Resume statin     Resp stable on NC  Continue incentive spirometry     No obvious infectious s/s, febrile, WBC normal   Will d/c zosyn and monitor off abx     D/c Colin     PO diet     DVT ppx with lovenox       Keep in CCU today, likely transfer to floor 2/13 if stable       Wife updated at bedside

## 2024-02-12 NOTE — PROGRESS NOTE ADULT - SUBJECTIVE AND OBJECTIVE BOX
Patient is a 83y old  Male who presents with a chief complaint of Left sided rib pain and cough (11 Feb 2024 17:15)    24 hour events:     Allergies    No Known Allergies    Intolerances      REVIEW OF SYSTEMS: SEE BELOW       ICU Vital Signs Last 24 Hrs  T(C): 37.1 (12 Feb 2024 05:24), Max: 37.3 (12 Feb 2024 00:47)  T(F): 98.7 (12 Feb 2024 05:24), Max: 99.1 (12 Feb 2024 00:47)  HR: 92 (12 Feb 2024 08:29) (70 - 105)  BP: 111/82 (12 Feb 2024 05:00) (111/82 - 163/91)  BP(mean): 90 (12 Feb 2024 05:00) (85 - 141)  ABP: --  ABP(mean): --  RR: 24 (12 Feb 2024 05:00) (12 - 30)  SpO2: 100% (12 Feb 2024 08:29) (95% - 100%)    O2 Parameters below as of 12 Feb 2024 08:29  Patient On (Oxygen Delivery Method): nasal cannula            CAPILLARY BLOOD GLUCOSE          I&O's Summary    11 Feb 2024 07:01  -  12 Feb 2024 07:00  --------------------------------------------------------  IN: 0 mL / OUT: 1000 mL / NET: -1000 mL            MEDICATIONS  (STANDING):  azithromycin   Tablet 500 milliGRAM(s) Oral daily  budesonide 160 MICROgram(s)/formoterol 4.5 MICROgram(s) Inhaler 2 Puff(s) Inhalation two times a day  enoxaparin Injectable 40 milliGRAM(s) SubCutaneous every 24 hours  folic acid Injectable 1 milliGRAM(s) IV Push daily  LORazepam   Injectable   IV Push   LORazepam   Injectable 0.5 milliGRAM(s) IV Push every 4 hours  piperacillin/tazobactam IVPB.. 3.375 Gram(s) IV Intermittent every 8 hours  polyethylene glycol 3350 17 Gram(s) Oral every 12 hours  potassium chloride    Tablet ER 40 milliEquivalent(s) Oral once  thiamine Injectable 100 milliGRAM(s) IV Push daily      MEDICATIONS  (PRN):  acetaminophen     Tablet .. 650 milliGRAM(s) Oral every 6 hours PRN Temp greater or equal to 38C (100.4F), Mild Pain (1 - 3)  albuterol    90 MICROgram(s) HFA Inhaler 2 Puff(s) Inhalation every 6 hours PRN Shortness of Breath and/or Wheezing  aluminum hydroxide/magnesium hydroxide/simethicone Suspension 30 milliLiter(s) Oral every 4 hours PRN Dyspepsia  bisacodyl Suppository 10 milliGRAM(s) Rectal daily PRN Constipation  hydrALAZINE Injectable 10 milliGRAM(s) IV Push every 6 hours PRN SBP> 140  LORazepam   Injectable 2 milliGRAM(s) IV Push every 2 hours PRN Symptom-triggered: 2 point increase in CIWA -Ar score and a total score of 7 or LESS  ondansetron Injectable 4 milliGRAM(s) IV Push once PRN Nausea and/or Vomiting      PHYSICAL EXAM: SEE BELOW                          12.3   8.26  )-----------( 228      ( 12 Feb 2024 05:41 )             35.4       02-12    138  |  106  |  23  ----------------------------<  113<H>  3.4<L>   |  27  |  0.82    Ca    8.6      12 Feb 2024 05:41  Phos  2.8     02-12  Mg     2.3     02-12    TPro  6.6  /  Alb  2.8<L>  /  TBili  0.8  /  DBili  x   /  AST  19  /  ALT  35  /  AlkPhos  53  02-12            Urinalysis Basic - ( 12 Feb 2024 05:41 )    Color: x / Appearance: x / SG: x / pH: x  Gluc: 113 mg/dL / Ketone: x  / Bili: x / Urobili: x   Blood: x / Protein: x / Nitrite: x   Leuk Esterase: x / RBC: x / WBC x   Sq Epi: x / Non Sq Epi: x / Bacteria: x      .Blood None   No growth at 48 Hours -- 02-09 @ 17:59  .Blood None   No growth at 4 days -- 02-07 @ 23:15

## 2024-02-13 LAB
ANION GAP SERPL CALC-SCNC: 5 MMOL/L — SIGNIFICANT CHANGE UP (ref 5–17)
BUN SERPL-MCNC: 23 MG/DL — SIGNIFICANT CHANGE UP (ref 7–23)
CALCIUM SERPL-MCNC: 9.1 MG/DL — SIGNIFICANT CHANGE UP (ref 8.5–10.1)
CHLORIDE SERPL-SCNC: 109 MMOL/L — HIGH (ref 96–108)
CO2 SERPL-SCNC: 24 MMOL/L — SIGNIFICANT CHANGE UP (ref 22–31)
CREAT SERPL-MCNC: 0.7 MG/DL — SIGNIFICANT CHANGE UP (ref 0.5–1.3)
CULTURE RESULTS: SIGNIFICANT CHANGE UP
CULTURE RESULTS: SIGNIFICANT CHANGE UP
EGFR: 92 ML/MIN/1.73M2 — SIGNIFICANT CHANGE UP
GLUCOSE SERPL-MCNC: 119 MG/DL — HIGH (ref 70–99)
HCT VFR BLD CALC: 35.8 % — LOW (ref 39–50)
HGB BLD-MCNC: 12.5 G/DL — LOW (ref 13–17)
MAGNESIUM SERPL-MCNC: 2.5 MG/DL — SIGNIFICANT CHANGE UP (ref 1.6–2.6)
MCHC RBC-ENTMCNC: 34.9 GM/DL — SIGNIFICANT CHANGE UP (ref 32–36)
MCHC RBC-ENTMCNC: 36.3 PG — HIGH (ref 27–34)
MCV RBC AUTO: 104.1 FL — HIGH (ref 80–100)
NT-PROBNP SERPL-SCNC: 165 PG/ML — SIGNIFICANT CHANGE UP (ref 0–450)
PHOSPHATE SERPL-MCNC: 3.6 MG/DL — SIGNIFICANT CHANGE UP (ref 2.5–4.5)
PLATELET # BLD AUTO: 209 K/UL — SIGNIFICANT CHANGE UP (ref 150–400)
POTASSIUM SERPL-MCNC: 4.1 MMOL/L — SIGNIFICANT CHANGE UP (ref 3.5–5.3)
POTASSIUM SERPL-SCNC: 4.1 MMOL/L — SIGNIFICANT CHANGE UP (ref 3.5–5.3)
RBC # BLD: 3.44 M/UL — LOW (ref 4.2–5.8)
RBC # FLD: 13 % — SIGNIFICANT CHANGE UP (ref 10.3–14.5)
SODIUM SERPL-SCNC: 138 MMOL/L — SIGNIFICANT CHANGE UP (ref 135–145)
SPECIMEN SOURCE: SIGNIFICANT CHANGE UP
SPECIMEN SOURCE: SIGNIFICANT CHANGE UP
TSH SERPL-MCNC: 2.98 UU/ML — SIGNIFICANT CHANGE UP (ref 0.34–4.82)
WBC # BLD: 6.46 K/UL — SIGNIFICANT CHANGE UP (ref 3.8–10.5)
WBC # FLD AUTO: 6.46 K/UL — SIGNIFICANT CHANGE UP (ref 3.8–10.5)

## 2024-02-13 PROCEDURE — 99233 SBSQ HOSP IP/OBS HIGH 50: CPT

## 2024-02-13 RX ADMIN — Medication 25 MILLIGRAM(S): at 10:42

## 2024-02-13 RX ADMIN — Medication 1 MILLIGRAM(S): at 13:57

## 2024-02-13 RX ADMIN — Medication 3 MILLIGRAM(S): at 18:59

## 2024-02-13 RX ADMIN — Medication 2 MILLIGRAM(S): at 06:09

## 2024-02-13 RX ADMIN — Medication 50 MILLIGRAM(S): at 21:27

## 2024-02-13 RX ADMIN — Medication 3 MILLIGRAM(S): at 22:34

## 2024-02-13 RX ADMIN — Medication 3 MILLIGRAM(S): at 10:41

## 2024-02-13 RX ADMIN — BUDESONIDE AND FORMOTEROL FUMARATE DIHYDRATE 2 PUFF(S): 160; 4.5 AEROSOL RESPIRATORY (INHALATION) at 20:20

## 2024-02-13 RX ADMIN — LISINOPRIL 20 MILLIGRAM(S): 2.5 TABLET ORAL at 10:42

## 2024-02-13 RX ADMIN — Medication 100 MILLIGRAM(S): at 10:41

## 2024-02-13 RX ADMIN — ENOXAPARIN SODIUM 40 MILLIGRAM(S): 100 INJECTION SUBCUTANEOUS at 18:59

## 2024-02-13 RX ADMIN — PREGABALIN 1000 MICROGRAM(S): 225 CAPSULE ORAL at 10:42

## 2024-02-13 RX ADMIN — Medication 3 MILLIGRAM(S): at 15:28

## 2024-02-13 RX ADMIN — ATORVASTATIN CALCIUM 20 MILLIGRAM(S): 80 TABLET, FILM COATED ORAL at 21:27

## 2024-02-13 NOTE — PROGRESS NOTE ADULT - SUBJECTIVE AND OBJECTIVE BOX
Patient is a 83y old  Male who presents with a chief complaint of Left sided rib pain and cough (11 Feb 2024 17:15)    24 hour events:       Allergies    No Known Allergies    Intolerances      REVIEW OF SYSTEMS: SEE BELOW       ICU Vital Signs Last 24 Hrs  T(C): 36.1 (13 Feb 2024 10:23), Max: 37.1 (13 Feb 2024 00:03)  T(F): 97 (13 Feb 2024 10:23), Max: 98.8 (13 Feb 2024 00:03)  HR: 63 (13 Feb 2024 17:00) (46 - 89)  BP: 151/72 (13 Feb 2024 17:00) (112/60 - 183/84)  BP(mean): 96 (13 Feb 2024 17:00) (76 - 124)  ABP: --  ABP(mean): --  RR: 22 (13 Feb 2024 17:00) (13 - 31)  SpO2: 98% (13 Feb 2024 17:00) (90% - 98%)    O2 Parameters below as of 12 Feb 2024 22:49  Patient On (Oxygen Delivery Method): nasal cannula, high flow  O2 Flow (L/min): 30  O2 Concentration (%): 100            CAPILLARY BLOOD GLUCOSE          I&O's Summary    11 Feb 2024 07:01  -  12 Feb 2024 07:00  --------------------------------------------------------  IN: 0 mL / OUT: 1000 mL / NET: -1000 mL        MEDICATIONS  (STANDING):  atorvastatin 20 milliGRAM(s) Oral at bedtime  budesonide 160 MICROgram(s)/formoterol 4.5 MICROgram(s) Inhaler 2 Puff(s) Inhalation two times a day  cyanocobalamin 1000 MICROGram(s) Oral daily  dexMEDEtomidine Infusion 0.5 MICROgram(s)/kG/Hr (10.7 mL/Hr) IV Continuous <Continuous>  enoxaparin Injectable 40 milliGRAM(s) SubCutaneous every 24 hours  folic acid Injectable 1 milliGRAM(s) IV Push daily  lisinopril 20 milliGRAM(s) Oral daily  LORazepam   Injectable 3 milliGRAM(s) IV Push every 4 hours  metoprolol succinate ER 25 milliGRAM(s) Oral daily  thiamine Injectable 100 milliGRAM(s) IV Push daily  traZODone 50 milliGRAM(s) Oral at bedtime    MEDICATIONS  (PRN):  acetaminophen     Tablet .. 650 milliGRAM(s) Oral every 6 hours PRN Temp greater or equal to 38C (100.4F), Mild Pain (1 - 3)  albuterol    90 MICROgram(s) HFA Inhaler 2 Puff(s) Inhalation every 6 hours PRN Shortness of Breath and/or Wheezing  aluminum hydroxide/magnesium hydroxide/simethicone Suspension 30 milliLiter(s) Oral every 4 hours PRN Dyspepsia  bisacodyl Suppository 10 milliGRAM(s) Rectal daily PRN Constipation  hydrALAZINE Injectable 10 milliGRAM(s) IV Push every 6 hours PRN SBP> 140  LORazepam   Injectable 3 milliGRAM(s) IV Push every 2 hours PRN agitation, CIWA > 10  ondansetron Injectable 4 milliGRAM(s) IV Push once PRN Nausea and/or Vomiting      PHYSICAL EXAM: SEE BELOW                          12.3   8.26  )-----------( 228      ( 12 Feb 2024 05:41 )             35.4       02-12    138  |  106  |  23  ----------------------------<  113<H>  3.4<L>   |  27  |  0.82    Ca    8.6      12 Feb 2024 05:41  Phos  2.8     02-12  Mg     2.3     02-12    TPro  6.6  /  Alb  2.8<L>  /  TBili  0.8  /  DBili  x   /  AST  19  /  ALT  35  /  AlkPhos  53  02-12            Urinalysis Basic - ( 12 Feb 2024 05:41 )    Color: x / Appearance: x / SG: x / pH: x  Gluc: 113 mg/dL / Ketone: x  / Bili: x / Urobili: x   Blood: x / Protein: x / Nitrite: x   Leuk Esterase: x / RBC: x / WBC x   Sq Epi: x / Non Sq Epi: x / Bacteria: x      .Blood None   No growth at 48 Hours -- 02-09 @ 17:59  .Blood None   No growth at 4 days -- 02-07 @ 23:15

## 2024-02-13 NOTE — PHYSICAL THERAPY INITIAL EVALUATION ADULT - GAIT TRAINING, PT EVAL
Patient will be able to safely ambulate 300 feet with Rolling Walker by discharge from acute care setting.
3. (2wks) indep amb w/  ft

## 2024-02-13 NOTE — PROGRESS NOTE ADULT - ASSESSMENT
84 y/o male PMH ETOH abuse, HTN, HLD, arrythmia     Initially admitted for L rib pain, cough x 1 week PTA, suspected trauma  CT with L 11th rib fx    Subsequently developed DTs and acute pulmonary edema     Transferred to CCU 2/9   Required Precedex and diuresis   precedex off - 2/13.   ativan increased to 3mg q4h     Plan:     Continues to have ETOH withdrawal symptoms with delirium   Will increase Ativan to 3 mg q4   Continue thiamine, FA  Resume trazodone    Hypertensive - resume lisinopril, Toprol  Resume statin   BNP initially elevate to 700s,  echo done, normal EF, no further lasix     Resp stable on NC - will continue to wean as tolerate   Continue incentive spirometry     No obvious infectious s/s, febrile, WBC normal   monitor off abx     D/c Colin     PO diet     DVT ppx with lovenox       Keep in CCU today, likely transfer to floor 2/14 if able to wean down ativan     Wife updated at bedside

## 2024-02-13 NOTE — PHYSICAL THERAPY INITIAL EVALUATION ADULT - MANUAL MUSCLE TESTING RESULTS, REHAB EVAL
no strength deficits were identified
4/5 throughout B UE and LE/no strength deficits were identified

## 2024-02-13 NOTE — PHYSICAL THERAPY INITIAL EVALUATION ADULT - PERTINENT HX OF CURRENT PROBLEM, REHAB EVAL
82 y/o M with PMHx of arrythmia, diverticulosis, HLD, HTN presents to the ED c/o chest pain/Rib Pain and  cough x1 week. Pt reports that the cough is getting worse and is now having a lot of pain on left side of his rib. Torso movement and deep breathing exacerbates pain. Pt suspects he hurt something during coughing fit. Denies vomiting, but endorses nausea. Pt not on o2. Denies any trauma, palpitations, syncop, abd pain or dyspnea.
see IE. 2/9 RRT called on floor d/t patient having SOB, increased work of breathing, and increased agitation. Patient is chronic alcoholic and having DT's. Patient started on ativan and CIWA protocol. Patient noted to have significantly worsened agitation and becoming hypoxic, CXR show pulmonary edema. Pt was Tx to CCU for monitoring and potential airway compromise. Required Precedex and diuresis

## 2024-02-13 NOTE — PHYSICAL THERAPY INITIAL EVALUATION ADULT - GENERAL OBSERVATIONS, REHAB EVAL
pt rec'd supine in bed in CCU, O2 at 5LPM, monitors, 1:1 present, spouse. pt sleeping, aroused w/ verbal stim, pt calm, cooperative, agreeable to PT
Pt found supine in bed on 2N in NAD, +3L/min O2, agreeable to participate in PT Evaluation. Pt is able to perform bed mobility with Deborah. Pt is able to perform sit<>stand transfer to RW with CGA-Deborah. Pt is able to ambulate 75 feet with RW and CGA. Pt returned to bed per patient request with call bell and phone in reach, bed alarm on, JERROD Fatima is aware.

## 2024-02-13 NOTE — PHYSICAL THERAPY INITIAL EVALUATION ADULT - BED MOBILITY TRAINING, PT EVAL
1. (2wks) indep
Patient will be independent in all bed mobility activities by discharge from acute care setting.

## 2024-02-14 LAB
ALBUMIN SERPL ELPH-MCNC: 2.8 G/DL — LOW (ref 3.3–5)
ALP SERPL-CCNC: 55 U/L — SIGNIFICANT CHANGE UP (ref 40–120)
ALT FLD-CCNC: 47 U/L — SIGNIFICANT CHANGE UP (ref 12–78)
ANION GAP SERPL CALC-SCNC: 6 MMOL/L — SIGNIFICANT CHANGE UP (ref 5–17)
AST SERPL-CCNC: 26 U/L — SIGNIFICANT CHANGE UP (ref 15–37)
BILIRUB SERPL-MCNC: 0.5 MG/DL — SIGNIFICANT CHANGE UP (ref 0.2–1.2)
BUN SERPL-MCNC: 24 MG/DL — HIGH (ref 7–23)
CALCIUM SERPL-MCNC: 9 MG/DL — SIGNIFICANT CHANGE UP (ref 8.5–10.1)
CHLORIDE SERPL-SCNC: 109 MMOL/L — HIGH (ref 96–108)
CO2 SERPL-SCNC: 23 MMOL/L — SIGNIFICANT CHANGE UP (ref 22–31)
CREAT SERPL-MCNC: 0.67 MG/DL — SIGNIFICANT CHANGE UP (ref 0.5–1.3)
EGFR: 93 ML/MIN/1.73M2 — SIGNIFICANT CHANGE UP
FOLATE SERPL-MCNC: >20 NG/ML — SIGNIFICANT CHANGE UP
GLUCOSE SERPL-MCNC: 95 MG/DL — SIGNIFICANT CHANGE UP (ref 70–99)
HCT VFR BLD CALC: 35.8 % — LOW (ref 39–50)
HGB BLD-MCNC: 12.4 G/DL — LOW (ref 13–17)
MAGNESIUM SERPL-MCNC: 2.2 MG/DL — SIGNIFICANT CHANGE UP (ref 1.6–2.6)
MCHC RBC-ENTMCNC: 34.6 GM/DL — SIGNIFICANT CHANGE UP (ref 32–36)
MCHC RBC-ENTMCNC: 35.9 PG — HIGH (ref 27–34)
MCV RBC AUTO: 103.8 FL — HIGH (ref 80–100)
PHOSPHATE SERPL-MCNC: 3.3 MG/DL — SIGNIFICANT CHANGE UP (ref 2.5–4.5)
PLATELET # BLD AUTO: 245 K/UL — SIGNIFICANT CHANGE UP (ref 150–400)
POTASSIUM SERPL-MCNC: 3.5 MMOL/L — SIGNIFICANT CHANGE UP (ref 3.5–5.3)
POTASSIUM SERPL-SCNC: 3.5 MMOL/L — SIGNIFICANT CHANGE UP (ref 3.5–5.3)
PROCALCITONIN SERPL-MCNC: 0.03 NG/ML — SIGNIFICANT CHANGE UP (ref 0.02–0.1)
PROT SERPL-MCNC: 6.5 GM/DL — SIGNIFICANT CHANGE UP (ref 6–8.3)
RBC # BLD: 3.45 M/UL — LOW (ref 4.2–5.8)
RBC # FLD: 12.5 % — SIGNIFICANT CHANGE UP (ref 10.3–14.5)
SODIUM SERPL-SCNC: 138 MMOL/L — SIGNIFICANT CHANGE UP (ref 135–145)
VIT B12 SERPL-MCNC: >2000 PG/ML — HIGH (ref 232–1245)
WBC # BLD: 7.62 K/UL — SIGNIFICANT CHANGE UP (ref 3.8–10.5)
WBC # FLD AUTO: 7.62 K/UL — SIGNIFICANT CHANGE UP (ref 3.8–10.5)

## 2024-02-14 PROCEDURE — 99233 SBSQ HOSP IP/OBS HIGH 50: CPT

## 2024-02-14 RX ORDER — METOPROLOL TARTRATE 50 MG
25 TABLET ORAL ONCE
Refills: 0 | Status: COMPLETED | OUTPATIENT
Start: 2024-02-14 | End: 2024-02-14

## 2024-02-14 RX ORDER — FOLIC ACID 0.8 MG
1 TABLET ORAL DAILY
Refills: 0 | Status: DISCONTINUED | OUTPATIENT
Start: 2024-02-14 | End: 2024-02-21

## 2024-02-14 RX ORDER — LISINOPRIL 2.5 MG/1
40 TABLET ORAL DAILY
Refills: 0 | Status: DISCONTINUED | OUTPATIENT
Start: 2024-02-14 | End: 2024-02-21

## 2024-02-14 RX ORDER — THIAMINE MONONITRATE (VIT B1) 100 MG
100 TABLET ORAL DAILY
Refills: 0 | Status: COMPLETED | OUTPATIENT
Start: 2024-02-14 | End: 2024-02-17

## 2024-02-14 RX ORDER — POTASSIUM CHLORIDE 20 MEQ
40 PACKET (EA) ORAL ONCE
Refills: 0 | Status: COMPLETED | OUTPATIENT
Start: 2024-02-14 | End: 2024-02-14

## 2024-02-14 RX ORDER — QUETIAPINE FUMARATE 200 MG/1
25 TABLET, FILM COATED ORAL
Refills: 0 | Status: DISCONTINUED | OUTPATIENT
Start: 2024-02-14 | End: 2024-02-18

## 2024-02-14 RX ORDER — METOPROLOL TARTRATE 50 MG
50 TABLET ORAL DAILY
Refills: 0 | Status: DISCONTINUED | OUTPATIENT
Start: 2024-02-15 | End: 2024-02-17

## 2024-02-14 RX ADMIN — Medication 1 MILLIGRAM(S): at 14:42

## 2024-02-14 RX ADMIN — Medication 1 TABLET(S): at 14:42

## 2024-02-14 RX ADMIN — Medication 25 MILLIGRAM(S): at 12:57

## 2024-02-14 RX ADMIN — Medication 3 MILLIGRAM(S): at 02:23

## 2024-02-14 RX ADMIN — Medication 2 MILLIGRAM(S): at 17:03

## 2024-02-14 RX ADMIN — ENOXAPARIN SODIUM 40 MILLIGRAM(S): 100 INJECTION SUBCUTANEOUS at 16:51

## 2024-02-14 RX ADMIN — BUDESONIDE AND FORMOTEROL FUMARATE DIHYDRATE 2 PUFF(S): 160; 4.5 AEROSOL RESPIRATORY (INHALATION) at 20:25

## 2024-02-14 RX ADMIN — Medication 2 MILLIGRAM(S): at 21:08

## 2024-02-14 RX ADMIN — Medication 100 MILLIGRAM(S): at 14:42

## 2024-02-14 RX ADMIN — QUETIAPINE FUMARATE 25 MILLIGRAM(S): 200 TABLET, FILM COATED ORAL at 21:08

## 2024-02-14 RX ADMIN — Medication 2 MILLIGRAM(S): at 14:42

## 2024-02-14 RX ADMIN — ATORVASTATIN CALCIUM 20 MILLIGRAM(S): 80 TABLET, FILM COATED ORAL at 21:08

## 2024-02-14 RX ADMIN — Medication 650 MILLIGRAM(S): at 23:36

## 2024-02-14 RX ADMIN — Medication 40 MILLIEQUIVALENT(S): at 10:36

## 2024-02-14 RX ADMIN — BUDESONIDE AND FORMOTEROL FUMARATE DIHYDRATE 2 PUFF(S): 160; 4.5 AEROSOL RESPIRATORY (INHALATION) at 08:23

## 2024-02-14 RX ADMIN — Medication 3 MILLIGRAM(S): at 07:05

## 2024-02-14 RX ADMIN — Medication 2 MILLIGRAM(S): at 16:51

## 2024-02-14 RX ADMIN — LISINOPRIL 40 MILLIGRAM(S): 2.5 TABLET ORAL at 15:26

## 2024-02-14 NOTE — DIETITIAN INITIAL EVALUATION ADULT - WEIGHT (LBS)
[phone] Trilobed Flap Text: The defect edges were debeveled with a #15 scalpel blade.  Given the location of the defect and the proximity to free margins a trilobed flap was deemed most appropriate.  Using a sterile surgical marker, an appropriate trilobed flap drawn around the defect.    The area thus outlined was incised deep to adipose tissue with a #15 scalpel blade.  The skin margins were undermined to an appropriate distance in all directions utilizing iris scissors.

## 2024-02-14 NOTE — DIETITIAN INITIAL EVALUATION ADULT - PERTINENT MEDS FT
MEDICATIONS  (STANDING):  atorvastatin 20 milliGRAM(s) Oral at bedtime  budesonide 160 MICROgram(s)/formoterol 4.5 MICROgram(s) Inhaler 2 Puff(s) Inhalation two times a day  cyanocobalamin 1000 MICROGram(s) Oral daily  enoxaparin Injectable 40 milliGRAM(s) SubCutaneous every 24 hours  folic acid 1 milliGRAM(s) Oral daily  lisinopril 40 milliGRAM(s) Oral daily  LORazepam   Injectable   IV Push   LORazepam   Injectable 2 milliGRAM(s) IV Push every 4 hours  LORazepam   Injectable 2 milliGRAM(s) IV Push once  metoprolol succinate ER 25 milliGRAM(s) Oral once  multivitamin 1 Tablet(s) Oral daily  thiamine 100 milliGRAM(s) Oral daily  traZODone 50 milliGRAM(s) Oral at bedtime    MEDICATIONS  (PRN):  acetaminophen     Tablet .. 650 milliGRAM(s) Oral every 6 hours PRN Temp greater or equal to 38C (100.4F), Mild Pain (1 - 3)  albuterol    90 MICROgram(s) HFA Inhaler 2 Puff(s) Inhalation every 6 hours PRN Shortness of Breath and/or Wheezing  aluminum hydroxide/magnesium hydroxide/simethicone Suspension 30 milliLiter(s) Oral every 4 hours PRN Dyspepsia  bisacodyl Suppository 10 milliGRAM(s) Rectal daily PRN Constipation  LORazepam   Injectable 2 milliGRAM(s) IV Push every 2 hours PRN Symptom-triggered: 2 point increase in CIWA -Ar score and a total score of 7 or LESS  ondansetron Injectable 4 milliGRAM(s) IV Push once PRN Nausea and/or Vomiting    Home Medications:  atorvastatin 20 mg oral tablet: 1 tab(s) orally once a day (08 Feb 2024 09:01)  cyanocobalamin 1000 mcg oral tablet: 1 tab(s) orally once a day (08 Feb 2024 08:59)  folic acid 0.8 mg oral tablet: 1 tab(s) orally once a day (08 Feb 2024 08:59)  lisinopril 40 mg oral tablet: 1 tab(s) orally once a day (08 Feb 2024 08:59)  metoprolol succinate 25 mg oral tablet, extended release: 1 tab(s) orally once a day (08 Feb 2024 09:01)  traZODone 100 mg oral tablet: 1 tab(s) orally once a day (at bedtime) (08 Feb 2024 08:58)

## 2024-02-14 NOTE — DIETITIAN INITIAL EVALUATION ADULT - NSFNSGIIOFT_GEN_A_CORE
The patient is Stable - Low risk of patient condition declining or worsening    Shift Goals  Clinical Goals: IV ABT, ERCP tomorrow AM  Patient Goals: Rest  Family Goals: HUYEN    Progress made toward(s) clinical / shift goals:    Problem: Pain - Standard  Goal: Alleviation of pain or a reduction in pain to the patient’s comfort goal  Outcome: Progressing     Problem: Knowledge Deficit - Standard  Goal: Patient and family/care givers will demonstrate understanding of plan of care, disease process/condition, diagnostic tests and medications  Outcome: Progressing     Problem: Hemodynamics  Goal: Patient's hemodynamics, fluid balance and neurologic status will be stable or improve  Outcome: Progressing     Problem: Fluid Volume  Goal: Fluid volume balance will be maintained  Outcome: Progressing     Problem: Urinary - Renal Perfusion  Goal: Ability to achieve and maintain adequate renal perfusion and functioning will improve  Outcome: Progressing       Patient is not progressing towards the following goals:       I&O's Detail    13 Feb 2024 07:01  -  14 Feb 2024 07:00  --------------------------------------------------------  IN:    Oral Fluid: 300 mL  Total IN: 300 mL    OUT:    Voided (mL): 300 mL  Total OUT: 300 mL    Total NET: 0 mL

## 2024-02-14 NOTE — CHART NOTE - NSCHARTNOTEFT_GEN_A_CORE
Patient being downgraded out of CCU to hospitalist service. Patient seen in CCU and plan of care discussed with Attending Dr. Sotomayor     In summary:  82 y/o M with PMHx of arrythmia, diverticulosis, HLD, HTN presents to the ED c/o chest pain/Rib Pain and  cough x1 week. Pt reports that the cough is getting worse and is now having a lot of pain on left side of his rib. Torso movement and deep breathing exacerbates pain. Pt suspects he hurt something during coughing fit. Denies vomiting, but endorses nausea. Pt not on o2. Denies any trauma, palpitations, syncope, abd pain or dyspnea. Patient found to have 11th rib fracture and initially admitted to medicine.     Course c/b by RRT on 2/9:  RRT called on floor patient having SOB, increased work of breathing, and increased agitation. Patient is chronic alcoholic and is having DT's According to family patient has chronic binge drinking and found large quantity of empty Alchohol bottles at pt's home. Patient started on ativan and CIWA protocol today. Patient noted to have significantly worsened agitation and becoming hypoxic, started on Duoneb for suspected COPD/Asthma exacerbation. Patient was agitated pulling off venti mask and increased work of breathing sating 93% patient. CXR show pulmonary edema - transferred to CCU for further management.     Pt now improving respiratory wise after receiving IV diuresis for acute pulmonary edema, breathing comfortable on 3L NC. Being treated for alcohol withdrawal with DTs with IV ativan taper, s/p precedex gtt for agitation, on 1:1 for safety and improving. Now stable for downgrade to floors     Physical Exam:  ICU Vital Signs Last 24 Hrs  T(C): 36.3 (14 Feb 2024 04:42), Max: 36.3 (13 Feb 2024 17:51)  T(F): 97.4 (14 Feb 2024 04:42), Max: 97.4 (14 Feb 2024 04:42)  HR: 77 (14 Feb 2024 12:00) (63 - 90)  BP: 143/75 (14 Feb 2024 12:00) (139/72 - 176/82)  BP(mean): 94 (14 Feb 2024 12:00) (92 - 138)  RR: 25 (14 Feb 2024 09:00) (16 - 25)  SpO2: 99% (14 Feb 2024 08:00) (98% - 100%)    O2 Parameters below as of 13 Feb 2024 20:20  Patient On (Oxygen Delivery Method): nasal cannula, 3 lpm    Constitutional: NAD, awake, intermittently confused  HEENT: PERRLA, EOMI, MMM  Respiratory: Breath sounds are clear bilaterally, Normal effort on NC  Cardiovascular: S1 and S2, RRR, no murmurs, gallops or rubs  Gastrointestinal: +BS, soft, non-tender, non-distended, no CVA tenderness  Extremities: No peripheral edema, +DP pulses b/l  Neurological: A&O x 2, no focal deficits  Musculoskeletal: 5/5 strength b/l upper and lower extremities  Skin: Normal, skin warm and dry      A/P:    #Acute metabolic encephalopathy  #ETOH abuse with withdrawal c/b DTs  - s/p CCU care requiring precedex gtt, increasing doses of ativan  - CIWA protocol   - C/w IV ativan taper  - MV/folic acid/thiamine  - SW consult for ETOH abuse   - 1:1 for safety  - PT consult  - Trazodone qHS    #Atypical chest pain 2/2 left 11th rib fracture s/p suspected fall   - CT head negative   - CT chest with posterior left 11th rib fracture  - troponin neg x1  - EKG chronically abnormal, no significant change from previous EKGs  - Seen by cardiology Dr. De Guzman - no further workup planned  - TTE with preserved LVEF, mild-moderate valvular disease   - pain control     #Acute hypoxic respiratory failure 2/2 acute pulmonary edema  #Cough likely due to bronchitis   - s/p RRT on 2/9 as above  - Repeat CXR with increased vascular congestion   - s/p IV lasix , duonebs with improvement    - C/w supplemental O2, on 3L NC, wean as tolerated  - Cont inhalers   - Treated with 5 days azithromycin, zosyn for possible bronchitis     #HTN, HLD  - BP rising, resumed home lisinopril 40mg QD  - Increase Toprol to 50mg QD  - monitor and adjust meds PRN  - c/w statin    #DVT ppx  - Lovenox

## 2024-02-14 NOTE — PROGRESS NOTE ADULT - TIME BILLING
greater than 50% of time spent reviewing labs, notes, orders and radiographs, coordinating care  discussed with nursing, CCU PA
.
.

## 2024-02-14 NOTE — PROGRESS NOTE ADULT - RESPIRATORY
airway patent/good air movement/respirations non-labored
clear to auscultation bilaterally/no wheezes/no use of accessory muscles
improved with Lasix/airway patent/good air movement/respirations non-labored
clear to auscultation bilaterally/no wheezes/no use of accessory muscles

## 2024-02-14 NOTE — DIETITIAN INITIAL EVALUATION ADULT - ORAL INTAKE PTA/DIET HISTORY
Reports poor po intake x 1 mon 2/2 persistent cough causing poor appetite. Normally consumes 2 meals/ day and was still consuming 2 meals/ day over the last month however was eating less at the meals; consuming <75% of ENN x 1 mon. Does not follow any diet restrictions or use any ONS. Pt's wife does the cooking/ shopping however was on a prolonged vacation. Pt noted to drink 2-3 glasses of wine daily.

## 2024-02-14 NOTE — DIETITIAN INITIAL EVALUATION ADULT - PERTINENT LABORATORY DATA
02-14    138  |  109<H>  |  24<H>  ----------------------------<  95  3.5   |  23  |  0.67    Ca    9.0      14 Feb 2024 05:31  Phos  3.3     02-14  Mg     2.2     02-14    TPro  6.5  /  Alb  2.8<L>  /  TBili  0.5  /  DBili  x   /  AST  26  /  ALT  47  /  AlkPhos  55  02-14    Vitamin B12, Serum: >2000 pg/mL (02-13-24 @ 18:20)  Folate, Serum: >20.0 ng/mL (02-13-24 @ 18:20)

## 2024-02-14 NOTE — DIETITIAN INITIAL EVALUATION ADULT - ADD RECOMMEND
1) C/w Regular diet   2) Declines all ONS options despite max encouragement   3) Obtain vitamin D 25OH level to assess nutriture  4) Please obtain daily weights  5) c/w thiamine, folic acid, MVI/MIN  6) Encourage protein-rich foods, maximize food preferences  7) Monitor bowel movements, if no BM for >3 days, consider implementing bowel regimen.   8) Confirm goals of care regarding nutrition support  RD will continue to monitor PO intake, labs, hydration, and wt prn.

## 2024-02-14 NOTE — PROGRESS NOTE ADULT - ASSESSMENT
84 y/o male PMH ETOH abuse, HTN, HLD, arrythmia     Initially admitted for L rib pain, cough x 1 week PTA, suspected trauma  CT with L 11th rib fx    Subsequently developed DTs and acute pulmonary edema     Transferred to CCU 2/9   Required Precedex and diuresis       Plan:     Improving DT symptoms   Will start tapering Ativan   FA, thiamine   QHS trazodone  Will d/c CO later today     Hypertensive - continue lisinopril, increase Toprol  Statin     Resp stable on RA  Continue incentive spirometry     Monitor off abx     PO diet     DVT ppx with lovenox       Stable for floor, sign out given to janeth Araya

## 2024-02-14 NOTE — DIETITIAN INITIAL EVALUATION ADULT - OTHER INFO
84 y/o M with a PMHx of arrythmia, diverticulosis, HLD, HTN presented to the ED c/o chest pain/Rib Pain and cough x1 week. Pt reports that the cough is getting worse and is now having a lot of pain on left side of his rib. Torso movement and deep breathing exacerbates pain. Pt suspects he hurt something during coughing fit. Denies vomiting, but endorses nausea. Admitted for L Sided Rib Pain 2ndry to nondisplaced left posterior 11th rib fracture, abnormal ECG, and ETOH abuse/ withdrawal. S/p RR (2/9) for acute respiratory failure associated with diaphoresis and agitation; tx to CCU.     Reports an improved appetite/ intake this morning and that this was the first meal since admission that he ate well. Breakfast tray observed at bedside - 100% of tray consumed. Per NA at bedside, pt occasionally gets confused; occasionally trying to eat non-food items during visit. Reports that his UBW is 185# which he believes has been a stable wt and his clothes were fitting the same pta. RD obtained bedscale wt on 2/14 - 176#; 1+ edema may be skewing weight. Weight loss of 9# (4.9%) x? timeframe. NFPE reveals no muscle/ fat wasting, pt does not meet criteria for PCM at this time. Pt is at high risk for becoming malnourished. C/w Regular diet. Encouraged high kcal/ high protein intake, pt declines all ONS options despite max encouragement. Receptive to trial an ONS if his intake declines again. C/w thiamine, MVI/MIN, and folic acid. Please see additional recommendations below.

## 2024-02-14 NOTE — PROGRESS NOTE ADULT - MENTAL STATUS
awake, interactive, confused, very mild tremors
no tremors today, more interactive and coherent, not agitated

## 2024-02-14 NOTE — PROGRESS NOTE ADULT - SUBJECTIVE AND OBJECTIVE BOX
Patient is a 83y old  Male who presents with a chief complaint of Left sided rib pain and cough (13 Feb 2024 17:02)    24 hour events:     Allergies    No Known Allergies    Intolerances      REVIEW OF SYSTEMS: SEE BELOW       ICU Vital Signs Last 24 Hrs  T(C): 36.3 (14 Feb 2024 04:42), Max: 36.3 (13 Feb 2024 17:51)  T(F): 97.4 (14 Feb 2024 04:42), Max: 97.4 (14 Feb 2024 04:42)  HR: 79 (14 Feb 2024 08:25) (52 - 81)  BP: 176/82 (14 Feb 2024 08:00) (139/72 - 176/89)  BP(mean): 109 (14 Feb 2024 08:00) (92 - 138)  ABP: --  ABP(mean): --  RR: 18 (14 Feb 2024 08:00) (16 - 25)  SpO2: 99% (14 Feb 2024 08:00) (98% - 100%)    O2 Parameters below as of 13 Feb 2024 20:20  Patient On (Oxygen Delivery Method): nasal cannula, 3 lpm            CAPILLARY BLOOD GLUCOSE          I&O's Summary    13 Feb 2024 07:01  -  14 Feb 2024 07:00  --------------------------------------------------------  IN: 300 mL / OUT: 300 mL / NET: 0 mL            MEDICATIONS  (STANDING):  atorvastatin 20 milliGRAM(s) Oral at bedtime  budesonide 160 MICROgram(s)/formoterol 4.5 MICROgram(s) Inhaler 2 Puff(s) Inhalation two times a day  cyanocobalamin 1000 MICROGram(s) Oral daily  enoxaparin Injectable 40 milliGRAM(s) SubCutaneous every 24 hours  folic acid Injectable 1 milliGRAM(s) IV Push daily  lisinopril 40 milliGRAM(s) Oral daily  LORazepam   Injectable 3 milliGRAM(s) IV Push every 4 hours  LORazepam   Injectable   IV Push   metoprolol succinate ER 25 milliGRAM(s) Oral daily  thiamine Injectable 100 milliGRAM(s) IV Push daily  traZODone 50 milliGRAM(s) Oral at bedtime      MEDICATIONS  (PRN):  acetaminophen     Tablet .. 650 milliGRAM(s) Oral every 6 hours PRN Temp greater or equal to 38C (100.4F), Mild Pain (1 - 3)  albuterol    90 MICROgram(s) HFA Inhaler 2 Puff(s) Inhalation every 6 hours PRN Shortness of Breath and/or Wheezing  aluminum hydroxide/magnesium hydroxide/simethicone Suspension 30 milliLiter(s) Oral every 4 hours PRN Dyspepsia  bisacodyl Suppository 10 milliGRAM(s) Rectal daily PRN Constipation  hydrALAZINE Injectable 10 milliGRAM(s) IV Push every 6 hours PRN SBP> 140  LORazepam   Injectable 3 milliGRAM(s) IV Push every 2 hours PRN agitation, CIWA > 10  ondansetron Injectable 4 milliGRAM(s) IV Push once PRN Nausea and/or Vomiting      PHYSICAL EXAM: SEE BELOW                          12.4   7.62  )-----------( 245      ( 14 Feb 2024 05:31 )             35.8       02-14    138  |  109<H>  |  24<H>  ----------------------------<  95  3.5   |  23  |  0.67    Ca    9.0      14 Feb 2024 05:31  Phos  3.3     02-14  Mg     2.2     02-14    TPro  6.5  /  Alb  2.8<L>  /  TBili  0.5  /  DBili  x   /  AST  26  /  ALT  47  /  AlkPhos  55  02-14            Urinalysis Basic - ( 14 Feb 2024 05:31 )    Color: x / Appearance: x / SG: x / pH: x  Gluc: 95 mg/dL / Ketone: x  / Bili: x / Urobili: x   Blood: x / Protein: x / Nitrite: x   Leuk Esterase: x / RBC: x / WBC x   Sq Epi: x / Non Sq Epi: x / Bacteria: x      .Blood None   No growth at 4 days -- 02-09 @ 17:59

## 2024-02-15 LAB
ANION GAP SERPL CALC-SCNC: 6 MMOL/L — SIGNIFICANT CHANGE UP (ref 5–17)
BUN SERPL-MCNC: 20 MG/DL — SIGNIFICANT CHANGE UP (ref 7–23)
CALCIUM SERPL-MCNC: 9 MG/DL — SIGNIFICANT CHANGE UP (ref 8.5–10.1)
CHLORIDE SERPL-SCNC: 109 MMOL/L — HIGH (ref 96–108)
CO2 SERPL-SCNC: 25 MMOL/L — SIGNIFICANT CHANGE UP (ref 22–31)
CREAT SERPL-MCNC: 0.71 MG/DL — SIGNIFICANT CHANGE UP (ref 0.5–1.3)
CULTURE RESULTS: SIGNIFICANT CHANGE UP
CULTURE RESULTS: SIGNIFICANT CHANGE UP
EGFR: 92 ML/MIN/1.73M2 — SIGNIFICANT CHANGE UP
GLUCOSE SERPL-MCNC: 113 MG/DL — HIGH (ref 70–99)
HCT VFR BLD CALC: 33.6 % — LOW (ref 39–50)
HGB BLD-MCNC: 11.8 G/DL — LOW (ref 13–17)
MAGNESIUM SERPL-MCNC: 2.1 MG/DL — SIGNIFICANT CHANGE UP (ref 1.6–2.6)
MCHC RBC-ENTMCNC: 35.1 GM/DL — SIGNIFICANT CHANGE UP (ref 32–36)
MCHC RBC-ENTMCNC: 36.2 PG — HIGH (ref 27–34)
MCV RBC AUTO: 103.1 FL — HIGH (ref 80–100)
PHOSPHATE SERPL-MCNC: 3.1 MG/DL — SIGNIFICANT CHANGE UP (ref 2.5–4.5)
PLATELET # BLD AUTO: 239 K/UL — SIGNIFICANT CHANGE UP (ref 150–400)
POTASSIUM SERPL-MCNC: 3.7 MMOL/L — SIGNIFICANT CHANGE UP (ref 3.5–5.3)
POTASSIUM SERPL-SCNC: 3.7 MMOL/L — SIGNIFICANT CHANGE UP (ref 3.5–5.3)
RBC # BLD: 3.26 M/UL — LOW (ref 4.2–5.8)
RBC # FLD: 12.5 % — SIGNIFICANT CHANGE UP (ref 10.3–14.5)
SODIUM SERPL-SCNC: 140 MMOL/L — SIGNIFICANT CHANGE UP (ref 135–145)
SPECIMEN SOURCE: SIGNIFICANT CHANGE UP
SPECIMEN SOURCE: SIGNIFICANT CHANGE UP
WBC # BLD: 7.03 K/UL — SIGNIFICANT CHANGE UP (ref 3.8–10.5)
WBC # FLD AUTO: 7.03 K/UL — SIGNIFICANT CHANGE UP (ref 3.8–10.5)

## 2024-02-15 PROCEDURE — 99232 SBSQ HOSP IP/OBS MODERATE 35: CPT

## 2024-02-15 RX ADMIN — Medication 1 MILLIGRAM(S): at 10:20

## 2024-02-15 RX ADMIN — Medication 1.5 MILLIGRAM(S): at 10:19

## 2024-02-15 RX ADMIN — Medication 50 MILLIGRAM(S): at 21:07

## 2024-02-15 RX ADMIN — ENOXAPARIN SODIUM 40 MILLIGRAM(S): 100 INJECTION SUBCUTANEOUS at 18:35

## 2024-02-15 RX ADMIN — Medication 1.5 MILLIGRAM(S): at 18:35

## 2024-02-15 RX ADMIN — Medication 50 MILLIGRAM(S): at 10:20

## 2024-02-15 RX ADMIN — BUDESONIDE AND FORMOTEROL FUMARATE DIHYDRATE 2 PUFF(S): 160; 4.5 AEROSOL RESPIRATORY (INHALATION) at 21:06

## 2024-02-15 RX ADMIN — Medication 650 MILLIGRAM(S): at 00:30

## 2024-02-15 RX ADMIN — Medication 1 TABLET(S): at 10:19

## 2024-02-15 RX ADMIN — LISINOPRIL 40 MILLIGRAM(S): 2.5 TABLET ORAL at 10:19

## 2024-02-15 RX ADMIN — Medication 100 MILLIGRAM(S): at 10:20

## 2024-02-15 RX ADMIN — Medication 2 MILLIGRAM(S): at 02:11

## 2024-02-15 RX ADMIN — Medication 1.5 MILLIGRAM(S): at 21:17

## 2024-02-15 RX ADMIN — Medication 1.5 MILLIGRAM(S): at 14:00

## 2024-02-15 RX ADMIN — Medication 2 MILLIGRAM(S): at 06:03

## 2024-02-15 RX ADMIN — QUETIAPINE FUMARATE 25 MILLIGRAM(S): 200 TABLET, FILM COATED ORAL at 21:07

## 2024-02-15 RX ADMIN — ATORVASTATIN CALCIUM 20 MILLIGRAM(S): 80 TABLET, FILM COATED ORAL at 21:08

## 2024-02-15 RX ADMIN — BUDESONIDE AND FORMOTEROL FUMARATE DIHYDRATE 2 PUFF(S): 160; 4.5 AEROSOL RESPIRATORY (INHALATION) at 08:17

## 2024-02-15 NOTE — PROGRESS NOTE ADULT - SUBJECTIVE AND OBJECTIVE BOX
84 y/o M with PMHx of arrythmia, diverticulosis, HLD, HTN presents to the ED c/o chest pain/Rib Pain and  cough x1 week. Pt reports that the cough is getting worse and is now having a lot of pain on left side of his rib. Torso movement and deep breathing exacerbates pain. Pt suspects he hurt something during coughing fit. Denies vomiting, but endorses nausea. Pt not on o2. Denies any trauma, palpitations, syncope, abd pain or dyspnea. Patient found to have 11th rib fracture and initially admitted to medicine.     Course c/b by RRT on 2/9:  RRT called on floor patient having SOB, increased work of breathing, and increased agitation. Patient is chronic alcoholic and is having DT's According to family patient has chronic binge drinking and found large quantity of empty Alchohol bottles at pt's home. Patient started on ativan and CIWA protocol today. Patient noted to have significantly worsened agitation and becoming hypoxic, started on Duoneb for suspected COPD/Asthma exacerbation. Patient was agitated pulling off venti mask and increased work of breathing sating 93% patient. CXR show pulmonary edema - transferred to CCU for further management.     Pt now improving respiratory wise after receiving IV diuresis for acute pulmonary edema, breathing comfortable on 3L NC. Being treated for alcohol withdrawal with DTs with IV ativan taper, s/p precedex gtt for agitation, on 1:1 for safety and improving. Now stable for downgrade to floors       Medical progress:   Complaints:  State of mind:     Physical Exam:  T(C): 36.3 (14 Feb 2024 04:42), Max: 36.3 (13 Feb 2024 17:51)  T(F): 97.4 (14 Feb 2024 04:42), Max: 97.4 (14 Feb 2024 04:42)  HR: 77 (14 Feb 2024 12:00) (63 - 90)  BP: 143/75 (14 Feb 2024 12:00) (139/72 - 176/82)  BP(mean): 94 (14 Feb 2024 12:00) (92 - 138)  RR: 25 (14 Feb 2024 09:00) (16 - 25)  SpO2: 99% (14 Feb 2024 08:00) (98% - 100%)  Constitutional: NAD, awake, intermittently confused  HEENT: PERRLA, EOMI, MMM  Respiratory: Breath sounds are clear bilaterally, Normal effort on NC  Cardiovascular: S1 and S2, RRR, no murmurs, gallops or rubs  Gastrointestinal: +BS, soft, non-tender, non-distended, no CVA tenderness  Extremities: No peripheral edema, +DP pulses b/l  Neurological: A&O x 2, no focal deficits  Musculoskeletal: 5/5 strength b/l upper and lower extremities  Skin: Normal, skin warm and dry      #Acute metabolic encephalopathy  #ETOH abuse with withdrawal c/b DTs  - s/p CCU care requiring precedex gtt, increasing doses of ativan  - CIWA protocol   - C/w IV ativan taper  - MV/folic acid/thiamine  -  consult for ETOH abuse   - 1:1 for safety  - PT consult  - Trazodone qHS    #Atypical chest pain 2/2 left 11th rib fracture s/p suspected fall   - CT head negative   - CT chest with posterior left 11th rib fracture  - troponin neg x1  - EKG chronically abnormal, no significant change from previous EKGs  - Seen by cardiology Dr. De Guzman - no further workup planned  - TTE with preserved LVEF, mild-moderate valvular disease   - pain control     #Acute hypoxic respiratory failure 2/2 acute pulmonary edema  #Cough likely due to bronchitis   - s/p RRT on 2/9 as above  - Repeat CXR with increased vascular congestion   - s/p IV lasix , duonebs with improvement    - C/w supplemental O2, on 3L NC, wean as tolerated  - Cont inhalers   - Treated with 5 days azithromycin, zosyn for possible bronchitis     #HTN, HLD  - BP rising, resumed home lisinopril 40mg QD  - Increase Toprol to 50mg QD  - monitor and adjust meds PRN  - c/w statin    #DVT ppx  - Lovenox.   82 y/o M with PMHx of arrythmia, diverticulosis, HLD, HTN presents to the ED c/o chest pain/Rib Pain and  cough x1 week. Pt reports that the cough is getting worse and is now having a lot of pain on left side of his rib. Torso movement and deep breathing exacerbates pain. Pt suspects he hurt something during coughing fit. Denies vomiting, but endorses nausea. Pt not on o2. Denies any trauma, palpitations, syncope, abd pain or dyspnea. Patient found to have 11th rib fracture and initially admitted to medicine.     Course c/b by RRT on 2/9:  RRT called on floor patient having SOB, increased work of breathing, and increased agitation. Patient is chronic alcoholic and is having DT's According to family patient has chronic binge drinking and found large quantity of empty Alchohol bottles at pt's home. Patient started on ativan and CIWA protocol today. Patient noted to have significantly worsened agitation and becoming hypoxic, started on Duoneb for suspected COPD/Asthma exacerbation. Patient was agitated pulling off venti mask and increased work of breathing sating 93% patient. CXR show pulmonary edema - transferred to CCU for further management.     Pt now improving respiratory wise after receiving IV diuresis for acute pulmonary edema, breathing comfortable on 3L NC. Being treated for alcohol withdrawal with DTs with IV ativan taper, s/p precedex gtt for agitation, on 1:1 for safety and improving. Now stable for downgrade to floors       Medical progress: still tired and lethargic appearing but able to answer very simple commands. Denies any HA, CP, SOB. On 2 L of NC. Needs a incentive spirometer  Complaints: no new complaints  State of mind: still confused at time.     Physical Exam:  ICU Vital Signs Last 24 Hrs  T(C): 36.3 (15 Feb 2024 08:36), Max: 37.6 (14 Feb 2024 16:12)  T(F): 97.3 (15 Feb 2024 08:36), Max: 99.7 (14 Feb 2024 16:12)  HR: 77 (15 Feb 2024 10:16) (66 - 88)  BP: 144/82 (15 Feb 2024 10:16) (126/67 - 173/81)  BP(mean): 109 (15 Feb 2024 06:00) (83 - 114)  ABP: --  ABP(mean): --  RR: 18 (15 Feb 2024 10:16) (16 - 19)  SpO2: 97% (15 Feb 2024 10:16) (96% - 100%)    O2 Parameters below as of 15 Feb 2024 10:16  Patient On (Oxygen Delivery Method): nasal cannula  O2 Flow (L/min): 2        Constitutional: NAD, awake, intermittently confused  HEENT: PERRLA, EOMI, MMM  Respiratory: Breath sounds are clear bilaterally, Normal effort on NC  Cardiovascular: S1 and S2, RRR, no murmurs, gallops or rubs  Gastrointestinal: +BS, soft, non-tender, non-distended, no CVA tenderness  Extremities: No peripheral edema, +DP pulses b/l  Neurological: A&O x 2, no focal deficits  Musculoskeletal: 5/5 strength b/l upper and lower extremities  Skin: Normal, skin warm and dry      CBC Full  -  ( 15 Feb 2024 07:25 )  WBC Count : 7.03 K/uL  RBC Count : 3.26 M/uL  Hemoglobin : 11.8 g/dL  Hematocrit : 33.6 %  Platelet Count - Automated : 239 K/uL  Mean Cell Volume : 103.1 fl  Mean Cell Hemoglobin : 36.2 pg  Mean Cell Hemoglobin Concentration : 35.1 gm/dL  Auto Neutrophil # : x  Auto Lymphocyte # : x  Auto Monocyte # : x  Auto Eosinophil # : x  Auto Basophil # : x  Auto Neutrophil % : x  Auto Lymphocyte % : x  Auto Monocyte % : x  Auto Eosinophil % : x  Auto Basophil % : x      Urinalysis Basic - ( 15 Feb 2024 07:25 )    Color: x / Appearance: x / SG: x / pH: x  Gluc: 113 mg/dL / Ketone: x  / Bili: x / Urobili: x   Blood: x / Protein: x / Nitrite: x   Leuk Esterase: x / RBC: x / WBC x   Sq Epi: x / Non Sq Epi: x / Bacteria: x      02-15    140  |  109<H>  |  20  ----------------------------<  113<H>  3.7   |  25  |  0.71    Ca    9.0      15 Feb 2024 07:25  Phos  3.1     02-15  Mg     2.1     02-15    TPro  6.5  /  Alb  2.8<L>  /  TBili  0.5  /  DBili  x   /  AST  26  /  ALT  47  /  AlkPhos  55  02-14        #Acute metabolic encephalopathy  #ETOH abuse with withdrawal c/b DTs  - s/p CCU care requiring precedex gtt, increasing doses of ativan  - CIWA protocol   - C/w IV ativan taper  - MV/folic acid/thiamine  - SW consult for ETOH abuse   - 1:1 for safety  - PT consult  - Trazodone qHS    #Atypical chest pain 2/2 left 11th rib fracture s/p suspected fall   - CT head negative   - CT chest with posterior left 11th rib fracture  - troponin neg x1  - EKG chronically abnormal, no significant change from previous EKGs  - Seen by cardiology Dr. De Guzman - no further workup planned  - TTE with preserved LVEF, mild-moderate valvular disease   - pain control     #Acute hypoxic respiratory failure 2/2 acute pulmonary edema  #Cough likely due to bronchitis   - s/p RRT on 2/9 as above  - Repeat CXR with increased vascular congestion   - s/p IV lasix , duonebs with improvement    - C/w supplemental O2, on 3L NC, wean as tolerated  - Cont inhalers   - Treated with 5 days azithromycin, zosyn for possible bronchitis     #HTN, HLD  - BP rising, resumed home lisinopril 40mg QD  - Increase Toprol to 50mg QD  - monitor and adjust meds PRN  - c/w statin    #DVT ppx  - Lovenox.

## 2024-02-16 PROCEDURE — 99232 SBSQ HOSP IP/OBS MODERATE 35: CPT

## 2024-02-16 RX ORDER — POLYETHYLENE GLYCOL 3350 17 G/17G
17 POWDER, FOR SOLUTION ORAL AT BEDTIME
Refills: 0 | Status: DISCONTINUED | OUTPATIENT
Start: 2024-02-16 | End: 2024-02-21

## 2024-02-16 RX ADMIN — Medication 1 MILLIGRAM(S): at 22:44

## 2024-02-16 RX ADMIN — Medication 1 MILLIGRAM(S): at 10:35

## 2024-02-16 RX ADMIN — LISINOPRIL 40 MILLIGRAM(S): 2.5 TABLET ORAL at 10:30

## 2024-02-16 RX ADMIN — Medication 100 MILLIGRAM(S): at 10:30

## 2024-02-16 RX ADMIN — BUDESONIDE AND FORMOTEROL FUMARATE DIHYDRATE 2 PUFF(S): 160; 4.5 AEROSOL RESPIRATORY (INHALATION) at 08:14

## 2024-02-16 RX ADMIN — Medication 1.5 MILLIGRAM(S): at 06:16

## 2024-02-16 RX ADMIN — Medication 1 MILLIGRAM(S): at 13:45

## 2024-02-16 RX ADMIN — QUETIAPINE FUMARATE 25 MILLIGRAM(S): 200 TABLET, FILM COATED ORAL at 20:39

## 2024-02-16 RX ADMIN — Medication 1.5 MILLIGRAM(S): at 02:38

## 2024-02-16 RX ADMIN — Medication 50 MILLIGRAM(S): at 10:30

## 2024-02-16 RX ADMIN — Medication 1 MILLIGRAM(S): at 10:31

## 2024-02-16 RX ADMIN — Medication 50 MILLIGRAM(S): at 22:44

## 2024-02-16 RX ADMIN — BUDESONIDE AND FORMOTEROL FUMARATE DIHYDRATE 2 PUFF(S): 160; 4.5 AEROSOL RESPIRATORY (INHALATION) at 20:01

## 2024-02-16 RX ADMIN — Medication 2 MILLIGRAM(S): at 15:51

## 2024-02-16 RX ADMIN — Medication 1 MILLIGRAM(S): at 17:18

## 2024-02-16 RX ADMIN — Medication 1 TABLET(S): at 10:37

## 2024-02-16 RX ADMIN — ATORVASTATIN CALCIUM 20 MILLIGRAM(S): 80 TABLET, FILM COATED ORAL at 22:44

## 2024-02-16 RX ADMIN — ENOXAPARIN SODIUM 40 MILLIGRAM(S): 100 INJECTION SUBCUTANEOUS at 17:20

## 2024-02-16 NOTE — PROGRESS NOTE ADULT - SUBJECTIVE AND OBJECTIVE BOX
84 y/o M with PMHx of arrythmia, diverticulosis, HLD, HTN presents to the ED c/o chest pain/Rib Pain and  cough x1 week. Pt reports that the cough is getting worse and is now having a lot of pain on left side of his rib. Torso movement and deep breathing exacerbates pain. Pt suspects he hurt something during coughing fit. Denies vomiting, but endorses nausea. Pt not on o2. Denies any trauma, palpitations, syncope, abd pain or dyspnea. Patient found to have 11th rib fracture and initially admitted to medicine.     Course c/b by RRT on 2/9:  RRT called on floor patient having SOB, increased work of breathing, and increased agitation. Patient is chronic alcoholic and is having DT's According to family patient has chronic binge drinking and found large quantity of empty Alchohol bottles at pt's home. Patient started on ativan and CIWA protocol today. Patient noted to have significantly worsened agitation and becoming hypoxic, started on Duoneb for suspected COPD/Asthma exacerbation. Patient was agitated pulling off venti mask and increased work of breathing sating 93% patient. CXR show pulmonary edema - transferred to CCU for further management.     Pt now improving respiratory wise after receiving IV diuresis for acute pulmonary edema, breathing comfortable on 3L NC. Being treated for alcohol withdrawal with DTs with IV ativan taper, s/p precedex gtt for agitation, on 1:1 for safety and improving. Now stable for downgrade to floors       Medical progress: Still tired and lethargic appearing but able to answer very simple commands. Denies any HA, CP, SOB. On 2 L of NC. Needs a incentive spirometer  Complaints: no new complaints  State of mind: still confused at time.     Physical Exam:  ICU Vital Signs Last 24 Hrs  T(C): 36.8 (16 Feb 2024 07:50), Max: 37 (15 Feb 2024 18:29)  T(F): 98.3 (16 Feb 2024 07:50), Max: 98.6 (15 Feb 2024 18:29)  HR: 76 (16 Feb 2024 08:14) (69 - 110)  BP: 157/85 (16 Feb 2024 07:50) (137/64 - 169/85)  RR: 19 (16 Feb 2024 07:50) (18 - 19)  SpO2: 97% (16 Feb 2024 07:50) (97% - 100%)  Constitutional: NAD, awake, intermittently confused  HEENT: PERRLA, EOMI, MMM  Respiratory: Breath sounds are clear bilaterally, Normal effort on NC  Cardiovascular: S1 and S2, RRR, no murmurs, gallops or rubs  Gastrointestinal: +BS, soft, non-tender, non-distended, no CVA tenderness  Extremities: No peripheral edema, +DP pulses b/l  Neurological: A&O x 2, no focal deficits  Musculoskeletal: 5/5 strength b/l upper and lower extremities  Skin: Normal, skin warm and dry    Labs:   CBC Full  -  ( 15 Feb 2024 07:25 )  WBC Count : 7.03 K/uL  RBC Count : 3.26 M/uL  Hemoglobin : 11.8 g/dL  Hematocrit : 33.6 %  Platelet Count - Automated : 239 K/uL    Urinalysis Basic - ( 15 Feb 2024 07:25 )    Color: x / Appearance: x / SG: x / pH: x  Gluc: 113 mg/dL / Ketone: x  / Bili: x / Urobili: x   Blood: x / Protein: x / Nitrite: x   Leuk Esterase: x / RBC: x / WBC x   Sq Epi: x / Non Sq Epi: x / Bacteria: x    140  |  109<H>  |  20  ----------------------------<  113<H>  3.7   |  25  |  0.71    Ca    9.0      15 Feb 2024 07:25  Phos  3.1     02-15  Mg     2.1     02-15    # Acute metabolic encephalopathy  # ETOH abuse with withdrawal c/b DTs  - more awake this morning.   - CIWA-A protocol   - C/W IV ativan taper  - MV/folic acid/thiamine  - 1:1 for safety  - PT consult  - Trazodone qHS    # Atypical chest pain 2/2 left 11th rib fracture s/p suspected fall   - CT head negative   - CT chest with posterior left 11th rib fracture  - troponin neg x1  - EKG chronically abnormal, no significant change from previous EKGs  - Seen by cardiology Dr. De Guzman - no further workup planned  - TTE with preserved LVEF, mild-moderate valvular disease   - pain control     # Acute hypoxic respiratory failure 2/2 acute pulmonary edema  # Cough likely due to bronchitis   - s/p RRT on 2/9 as above  - Repeat CXR with increased vascular congestion   - s/p IV lasix , duonebs with improvement    - C/w supplemental O2, on 3L NC, wean as tolerated  - Cont inhalers   - Treated with 5 days azithromycin, zosyn for possible bronchitis     # HTN / HLD  - BP rising, resumed home lisinopril 40mg QD  - Increase Toprol to 50mg QD  - monitor and adjust meds PRN  - c/w statin    # DVT ppx  - Lovenox.   84 y/o M with PMHx of arrythmia, diverticulosis, HLD, HTN presents to the ED c/o chest pain/Rib Pain and  cough x1 week. Pt reports that the cough is getting worse and is now having a lot of pain on left side of his rib. Torso movement and deep breathing exacerbates pain. Pt suspects he hurt something during coughing fit. Denies vomiting, but endorses nausea. Pt not on o2. Denies any trauma, palpitations, syncope, abd pain or dyspnea. Patient found to have 11th rib fracture and initially admitted to medicine.     Course c/b by RRT on 2/9:  RRT called on floor patient having SOB, increased work of breathing, and increased agitation. Patient is chronic alcoholic and is having DT's According to family patient has chronic binge drinking and found large quantity of empty Alchohol bottles at pt's home. Patient started on ativan and CIWA protocol today. Patient noted to have significantly worsened agitation and becoming hypoxic, started on Duoneb for suspected COPD/Asthma exacerbation. Patient was agitated pulling off venti mask and increased work of breathing sating 93% patient. CXR show pulmonary edema - transferred to CCU for further management.     Pt now improving respiratory wise after receiving IV diuresis for acute pulmonary edema, breathing comfortable on 3L NC. Being treated for alcohol withdrawal with DTs with IV ativan taper, s/p precedex gtt for agitation, on 1:1 for safety and improving. Now stable for downgrade to floors       Medical progress: Still tired and lethargic appearing but able to answer very simple commands. Denies any HA, CP, SOB. On 2 L of NC. Needs a incentive spirometer. I spoke with patient's son maria esther who states patient is heavy drinker ( many years now). Patient's family has noted cognitive decline, trouble with ambulation   Complaints: no new complaints  State of mind: still confused at time.     Physical Exam:  ICU Vital Signs Last 24 Hrs  T(C): 36.8 (16 Feb 2024 07:50), Max: 37 (15 Feb 2024 18:29)  T(F): 98.3 (16 Feb 2024 07:50), Max: 98.6 (15 Feb 2024 18:29)  HR: 76 (16 Feb 2024 08:14) (69 - 110)  BP: 157/85 (16 Feb 2024 07:50) (137/64 - 169/85)  RR: 19 (16 Feb 2024 07:50) (18 - 19)  SpO2: 97% (16 Feb 2024 07:50) (97% - 100%)  Constitutional: NAD, awake, intermittently confused  HEENT: PERRLA, EOMI, MMM  Respiratory: Breath sounds are clear bilaterally, Normal effort on NC  Cardiovascular: S1 and S2, RRR, no murmurs, gallops or rubs  Gastrointestinal: +BS, soft, non-tender, non-distended, no CVA tenderness  Extremities: No peripheral edema, +DP pulses b/l  Neurological: A&O x 2, no focal deficits  Musculoskeletal: 5/5 strength b/l upper and lower extremities  Skin: Normal, skin warm and dry    Labs:   CBC Full  -  ( 15 Feb 2024 07:25 )  WBC Count : 7.03 K/uL  RBC Count : 3.26 M/uL  Hemoglobin : 11.8 g/dL  Hematocrit : 33.6 %  Platelet Count - Automated : 239 K/uL    Urinalysis Basic - ( 15 Feb 2024 07:25 )    Color: x / Appearance: x / SG: x / pH: x  Gluc: 113 mg/dL / Ketone: x  / Bili: x / Urobili: x   Blood: x / Protein: x / Nitrite: x   Leuk Esterase: x / RBC: x / WBC x   Sq Epi: x / Non Sq Epi: x / Bacteria: x    140  |  109<H>  |  20  ----------------------------<  113<H>  3.7   |  25  |  0.71    Ca    9.0      15 Feb 2024 07:25  Phos  3.1     02-15  Mg     2.1     02-15    # Acute metabolic encephalopathy  # ETOH abuse with withdrawal c/b DTs  - more awake this morning.   - CIWA-A protocol   - C/W IV ativan taper  - MV/folic acid/thiamine  - 1:1 for safety  - PT consult  - Trazodone qHS    # Atypical chest pain 2/2 left 11th rib fracture s/p suspected fall   - CT head negative   - CT chest with posterior left 11th rib fracture  - troponin neg x1  - EKG chronically abnormal, no significant change from previous EKGs  - Seen by cardiology Dr. De Guzman - no further workup planned  - TTE with preserved LVEF, mild-moderate valvular disease   - pain control     # Acute hypoxic respiratory failure 2/2 acute pulmonary edema  # Cough likely due to bronchitis   - s/p RRT on 2/9 as above  - Repeat CXR with increased vascular congestion   - s/p IV lasix , duonebs with improvement    - C/w supplemental O2, on 3L NC, wean as tolerated  - Cont inhalers   - Treated with 5 days azithromycin, zosyn for possible bronchitis     # HTN / HLD  - BP rising, resumed home lisinopril 40mg QD  - Increase Toprol to 50mg QD  - monitor and adjust meds PRN  - c/w statin    # DVT ppx  - Lovenox.

## 2024-02-17 LAB
ANION GAP SERPL CALC-SCNC: 7 MMOL/L — SIGNIFICANT CHANGE UP (ref 5–17)
BUN SERPL-MCNC: 16 MG/DL — SIGNIFICANT CHANGE UP (ref 7–23)
CALCIUM SERPL-MCNC: 9.5 MG/DL — SIGNIFICANT CHANGE UP (ref 8.5–10.1)
CHLORIDE SERPL-SCNC: 106 MMOL/L — SIGNIFICANT CHANGE UP (ref 96–108)
CO2 SERPL-SCNC: 23 MMOL/L — SIGNIFICANT CHANGE UP (ref 22–31)
CREAT SERPL-MCNC: 0.73 MG/DL — SIGNIFICANT CHANGE UP (ref 0.5–1.3)
EGFR: 91 ML/MIN/1.73M2 — SIGNIFICANT CHANGE UP
GLUCOSE SERPL-MCNC: 140 MG/DL — HIGH (ref 70–99)
HCT VFR BLD CALC: 34.9 % — LOW (ref 39–50)
HGB BLD-MCNC: 12.5 G/DL — LOW (ref 13–17)
MCHC RBC-ENTMCNC: 35.8 GM/DL — SIGNIFICANT CHANGE UP (ref 32–36)
MCHC RBC-ENTMCNC: 36.1 PG — HIGH (ref 27–34)
MCV RBC AUTO: 100.9 FL — HIGH (ref 80–100)
PLATELET # BLD AUTO: 311 K/UL — SIGNIFICANT CHANGE UP (ref 150–400)
POTASSIUM SERPL-MCNC: 3.7 MMOL/L — SIGNIFICANT CHANGE UP (ref 3.5–5.3)
POTASSIUM SERPL-SCNC: 3.7 MMOL/L — SIGNIFICANT CHANGE UP (ref 3.5–5.3)
RBC # BLD: 3.46 M/UL — LOW (ref 4.2–5.8)
RBC # FLD: 12.4 % — SIGNIFICANT CHANGE UP (ref 10.3–14.5)
SODIUM SERPL-SCNC: 136 MMOL/L — SIGNIFICANT CHANGE UP (ref 135–145)
WBC # BLD: 6.53 K/UL — SIGNIFICANT CHANGE UP (ref 3.8–10.5)
WBC # FLD AUTO: 6.53 K/UL — SIGNIFICANT CHANGE UP (ref 3.8–10.5)

## 2024-02-17 PROCEDURE — 99222 1ST HOSP IP/OBS MODERATE 55: CPT

## 2024-02-17 PROCEDURE — 70450 CT HEAD/BRAIN W/O DYE: CPT | Mod: 26

## 2024-02-17 PROCEDURE — 99232 SBSQ HOSP IP/OBS MODERATE 35: CPT

## 2024-02-17 RX ORDER — METOPROLOL TARTRATE 50 MG
25 TABLET ORAL
Refills: 0 | Status: DISCONTINUED | OUTPATIENT
Start: 2024-02-17 | End: 2024-02-21

## 2024-02-17 RX ADMIN — Medication 0.5 MILLIGRAM(S): at 17:54

## 2024-02-17 RX ADMIN — BUDESONIDE AND FORMOTEROL FUMARATE DIHYDRATE 2 PUFF(S): 160; 4.5 AEROSOL RESPIRATORY (INHALATION) at 20:10

## 2024-02-17 RX ADMIN — Medication 1 MILLIGRAM(S): at 02:08

## 2024-02-17 RX ADMIN — ENOXAPARIN SODIUM 40 MILLIGRAM(S): 100 INJECTION SUBCUTANEOUS at 17:53

## 2024-02-17 RX ADMIN — Medication 650 MILLIGRAM(S): at 22:49

## 2024-02-17 RX ADMIN — Medication 50 MILLIGRAM(S): at 21:11

## 2024-02-17 RX ADMIN — Medication 100 MILLIGRAM(S): at 10:51

## 2024-02-17 RX ADMIN — Medication 1 MILLIGRAM(S): at 10:51

## 2024-02-17 RX ADMIN — Medication 25 MILLIGRAM(S): at 21:12

## 2024-02-17 RX ADMIN — ATORVASTATIN CALCIUM 20 MILLIGRAM(S): 80 TABLET, FILM COATED ORAL at 21:12

## 2024-02-17 RX ADMIN — Medication 1 MILLIGRAM(S): at 06:13

## 2024-02-17 RX ADMIN — BUDESONIDE AND FORMOTEROL FUMARATE DIHYDRATE 2 PUFF(S): 160; 4.5 AEROSOL RESPIRATORY (INHALATION) at 08:34

## 2024-02-17 RX ADMIN — Medication 0.5 MILLIGRAM(S): at 22:49

## 2024-02-17 RX ADMIN — Medication 1 TABLET(S): at 10:51

## 2024-02-17 RX ADMIN — QUETIAPINE FUMARATE 25 MILLIGRAM(S): 200 TABLET, FILM COATED ORAL at 21:12

## 2024-02-17 RX ADMIN — Medication 25 MILLIGRAM(S): at 16:01

## 2024-02-17 NOTE — PROGRESS NOTE ADULT - ASSESSMENT
# Acute metabolic encephalopathy  # ETOH abuse with withdrawal c/b DTs. s/p prcedex  - more awake this morning.   - CIWA-A protocol   - C/W IV ativan taper  - MV/folic acid/thiamine  - 1:1 for safety; rpt CT brain per neurology   - PT consult  - Trazodone qHS    # Atypical chest pain 2/2 left 11th rib fracture s/p suspected fall   - CT head negative   - CT chest with posterior left 11th rib fracture  - troponin neg x1  - EKG chronically abnormal, no significant change from previous EKGs  - Seen by cardiology Dr. De Guzman - no further workup planned  - TTE with preserved LVEF, mild-moderate valvular disease   - pain control     # Acute hypoxic respiratory failure 2/2 acute pulmonary edema  # Cough likely due to bronchitis   - s/p RRT on 2/9 as above  - Repeat CXR with increased vascular congestion   - s/p IV lasix , duonebs with improvement    - C/w supplemental O2, on 3L NC, wean as tolerated  - Cont inhalers   - Treated with 5 days azithromycin, zosyn for possible bronchitis     # HTN / HLD  - BP rising, resumed home lisinopril 40mg QD  - Increase Toprol to 50mg QD  - monitor and adjust meds PRN  - c/w statin    # DVT ppx  - Lovenox.    pt seen with RN and wife Dayana at bedside

## 2024-02-17 NOTE — CONSULT NOTE ADULT - SUBJECTIVE AND OBJECTIVE BOX
HPI:  82 y/o M with PMHx of arrythmia, diverticulosis, HLD, HTN presents to the ED c/o chest pain/Rib Pain and  cough x1 week. Pt reports that the cough is getting worse and is now having a lot of pain on left side of his rib. Torso movement and deep breathing exacerbates pain. Pt suspects he hurt something during coughing fit. Denies vomiting, but endorses nausea. Pt not on o2. Denies any trauma, palpitations, syncop, abd pain or dyspnea.     In the ER, patient received IVF and Morphine   (08 Feb 2024 09:23)      PAST MEDICAL & SURGICAL HISTORY:  Essential hypertension      Pure hypercholesterolemia      Diverticulosis      Arrhythmia      Inverted T wave      S/P ORIF (open reduction internal fixation) fracture  left 1995      S/P tonsillectomy  as a child      History of flexible sigmoidoscopy      History of right inguinal hernia repair      S/P cataract surgery  b/l 2010          Home Medications:  atorvastatin 20 mg oral tablet: 1 tab(s) orally once a day (08 Feb 2024 09:01)  cyanocobalamin 1000 mcg oral tablet: 1 tab(s) orally once a day (08 Feb 2024 08:59)  folic acid 0.8 mg oral tablet: 1 tab(s) orally once a day (08 Feb 2024 08:59)  lisinopril 40 mg oral tablet: 1 tab(s) orally once a day (08 Feb 2024 08:59)  metoprolol succinate 25 mg oral tablet, extended release: 1 tab(s) orally once a day (08 Feb 2024 09:01)  traZODone 100 mg oral tablet: 1 tab(s) orally once a day (at bedtime) (08 Feb 2024 08:58)      MEDICATIONS  (STANDING):  atorvastatin 20 milliGRAM(s) Oral at bedtime  budesonide 160 MICROgram(s)/formoterol 4.5 MICROgram(s) Inhaler 2 Puff(s) Inhalation two times a day  enoxaparin Injectable 40 milliGRAM(s) SubCutaneous every 24 hours  folic acid 1 milliGRAM(s) Oral daily  lisinopril 40 milliGRAM(s) Oral daily  LORazepam   Injectable   IV Push   LORazepam   Injectable 0.5 milliGRAM(s) IV Push every 4 hours  metoprolol succinate ER 50 milliGRAM(s) Oral daily  multivitamin 1 Tablet(s) Oral daily  QUEtiapine 25 milliGRAM(s) Oral <User Schedule>  thiamine 100 milliGRAM(s) Oral daily  traZODone 50 milliGRAM(s) Oral at bedtime    MEDICATIONS  (PRN):  acetaminophen     Tablet .. 650 milliGRAM(s) Oral every 6 hours PRN Temp greater or equal to 38C (100.4F), Mild Pain (1 - 3)  albuterol    90 MICROgram(s) HFA Inhaler 2 Puff(s) Inhalation every 6 hours PRN Shortness of Breath and/or Wheezing  aluminum hydroxide/magnesium hydroxide/simethicone Suspension 30 milliLiter(s) Oral every 4 hours PRN Dyspepsia  bisacodyl Suppository 10 milliGRAM(s) Rectal daily PRN Constipation  LORazepam   Injectable 2 milliGRAM(s) IV Push every 2 hours PRN Symptom-triggered: 2 point increase in CIWA -Ar score and a total score of 7 or LESS  ondansetron Injectable 4 milliGRAM(s) IV Push once PRN Nausea and/or Vomiting  polyethylene glycol 3350 17 Gram(s) Oral at bedtime PRN Constipation      Allergies    No Known Allergies    Intolerances        SOCIAL HISTORY: Denies tobacco, etoh abuse or illicit drug use    FAMILY HISTORY:  Family history of cancer in mother (Mother)        Vital Signs Last 24 Hrs  T(C): 36.4 (17 Feb 2024 08:08), Max: 37 (17 Feb 2024 01:59)  T(F): 97.5 (17 Feb 2024 08:08), Max: 98.6 (17 Feb 2024 01:59)  HR: 80 (17 Feb 2024 09:02) (69 - 82)  BP: 163/83 (17 Feb 2024 08:08) (134/74 - 163/83)  BP(mean): 99 (17 Feb 2024 06:08) (90 - 99)  RR: 18 (17 Feb 2024 08:08) (16 - 19)  SpO2: 97% (17 Feb 2024 08:08) (94% - 97%)    Parameters below as of 17 Feb 2024 09:02  Patient On (Oxygen Delivery Method): room air            REVIEW OF SYSTEMS:    CONSTITUTIONAL:  As per HPI.  HEENT:  Eyes:  No diplopia or blurred vision. ENT:  No earache, sore throat or runny nose.  CARDIOVASCULAR:  No pressure, squeezing, tightness, heaviness or aching about the chest, neck, axilla or epigastrium.  RESPIRATORY:  No cough, shortness of breath, PND or orthopnea.  GASTROINTESTINAL:  No nausea, vomiting or diarrhea.  GENITOURINARY:  No dysuria, frequency or urgency.  MUSCULOSKELETAL:  As per HPI.  SKIN:  No change in skin, hair or nails.  NEUROLOGIC:  No paresthesias, fasciculations, seizures or weakness.  PSYCHIATRIC:  No disorder of thought or mood.  ENDOCRINE:  No heat or cold intolerance, polyuria or polydipsia.  HEMATOLOGICAL:  No easy bruising or bleedings:  .     PHYSICAL EXAMINATION:    GENERAL APPEARANCE:  Pt. is not currently dyspneic, in no distress. Pt. is alert, oriented, and pleasant.  HEENT:  Pupils are normal and react normally. No icterus. Mucous membranes well colored.  NECK:  Supple. No lymphadenopathy. Jugular venous pressure not elevated. Carotids equal.   HEART:   The cardiac impulse has a normal quality. Regular. Normal S1 and S2. There are no murmurs, rubs or gallops noted  CHEST:  Chest is clear to auscultation. Normal respiratory effort.  ABDOMEN:  Soft and nontender.   EXTREMITIES:  There is no cyanosis, clubbing or edema.   SKIN:  No rash or significant lesions are noted.    LABS:                          RADIOLOGY & ADDITIONAL STUDIES:        HPI:    82 y/o M with PMHx of arrythmia, diverticulosis, HLD, HTN admitted c/o chest pain/Rib Pain and cough x1 week. Pt reports that the cough is getting worse and is now having a lot of pain on left side of his rib. Torso movement and deep breathing exacerbates pain. Pt suspects he hurt something during coughing fit. Denies vomiting, but endorses nausea. Pt not on o2. Denies any trauma, palpitations, syncop, abd pain or dyspnea. Patient found to have 11th rib fracture and initially admitted to medicine. RRT on 2/9: RRT called on floor patient having SOB, increased work of breathing, and increased agitation. Patient is chronic alcoholic and is having DT's According to family patient has chronic binge drinking and found large quantity of empty Alchohol bottles at pt's home. Patient started on ativan and CIWA protocol today. Patient noted to have significantly worsened agitation and becoming hypoxic, started on Duoneb for suspected COPD/Asthma exacerbation. Patient was agitated pulling off venti mask and increased work of breathing sating 93% patient. CXR show pulmonary edema - transferred to CCU for further management. Pt now improving respiratory wise after receiving IV diuresis for acute pulmonary edema, breathing comfortable on 3L NC. Being treated for alcohol withdrawal with DTs with IV ativan taper, s/p precedex gtt for agitation, on 1:1 for safety and improving. Now stable for downgrade to floors till tired and lethargic appearing but able to answer very simple commands. Denies any HA, CP, SOB. On 2 L of NC. Needs a incentive spirometer. I spoke with patient's son maria esther who states patient is heavy drinker ( many years now). Patient's family has noted cognitive decline, trouble with ambulation, pat seen for pulmonary eval, lathergy thought to be due to meds seroquel & ativan, family @ bedside, also h/o snoring & gaspimg for air during sleep.    PAST MEDICAL & SURGICAL HISTORY:  Essential hypertension      Pure hypercholesterolemia      Diverticulosis      Arrhythmia      Inverted T wave      S/P ORIF (open reduction internal fixation) fracture  left 1995      S/P tonsillectomy  as a child      History of flexible sigmoidoscopy      History of right inguinal hernia repair      S/P cataract surgery  b/l 2010          Home Medications:  atorvastatin 20 mg oral tablet: 1 tab(s) orally once a day (08 Feb 2024 09:01)  cyanocobalamin 1000 mcg oral tablet: 1 tab(s) orally once a day (08 Feb 2024 08:59)  folic acid 0.8 mg oral tablet: 1 tab(s) orally once a day (08 Feb 2024 08:59)  lisinopril 40 mg oral tablet: 1 tab(s) orally once a day (08 Feb 2024 08:59)  metoprolol succinate 25 mg oral tablet, extended release: 1 tab(s) orally once a day (08 Feb 2024 09:01)  traZODone 100 mg oral tablet: 1 tab(s) orally once a day (at bedtime) (08 Feb 2024 08:58)      MEDICATIONS  (STANDING):  atorvastatin 20 milliGRAM(s) Oral at bedtime  budesonide 160 MICROgram(s)/formoterol 4.5 MICROgram(s) Inhaler 2 Puff(s) Inhalation two times a day  enoxaparin Injectable 40 milliGRAM(s) SubCutaneous every 24 hours  folic acid 1 milliGRAM(s) Oral daily  lisinopril 40 milliGRAM(s) Oral daily  LORazepam   Injectable   IV Push   LORazepam   Injectable 0.5 milliGRAM(s) IV Push every 4 hours  metoprolol succinate ER 50 milliGRAM(s) Oral daily  multivitamin 1 Tablet(s) Oral daily  QUEtiapine 25 milliGRAM(s) Oral <User Schedule>  thiamine 100 milliGRAM(s) Oral daily  traZODone 50 milliGRAM(s) Oral at bedtime    MEDICATIONS  (PRN):  acetaminophen     Tablet .. 650 milliGRAM(s) Oral every 6 hours PRN Temp greater or equal to 38C (100.4F), Mild Pain (1 - 3)  albuterol    90 MICROgram(s) HFA Inhaler 2 Puff(s) Inhalation every 6 hours PRN Shortness of Breath and/or Wheezing  aluminum hydroxide/magnesium hydroxide/simethicone Suspension 30 milliLiter(s) Oral every 4 hours PRN Dyspepsia  bisacodyl Suppository 10 milliGRAM(s) Rectal daily PRN Constipation  LORazepam   Injectable 2 milliGRAM(s) IV Push every 2 hours PRN Symptom-triggered: 2 point increase in CIWA -Ar score and a total score of 7 or LESS  ondansetron Injectable 4 milliGRAM(s) IV Push once PRN Nausea and/or Vomiting  polyethylene glycol 3350 17 Gram(s) Oral at bedtime PRN Constipation      Allergies    No Known Allergies    Intolerances        SOCIAL HISTORY: Denies tobacco, etoh abuse or illicit drug use    FAMILY HISTORY:  Family history of cancer in mother (Mother)        Vital Signs Last 24 Hrs  T(C): 36.4 (17 Feb 2024 08:08), Max: 37 (17 Feb 2024 01:59)  T(F): 97.5 (17 Feb 2024 08:08), Max: 98.6 (17 Feb 2024 01:59)  HR: 80 (17 Feb 2024 09:02) (69 - 82)  BP: 163/83 (17 Feb 2024 08:08) (134/74 - 163/83)  BP(mean): 99 (17 Feb 2024 06:08) (90 - 99)  RR: 18 (17 Feb 2024 08:08) (16 - 19)  SpO2: 97% (17 Feb 2024 08:08) (94% - 97%)    Parameters below as of 17 Feb 2024 09:02  Patient On (Oxygen Delivery Method): room air            REVIEW OF SYSTEMS:    CONSTITUTIONAL:  As per HPI.  HEENT:  Eyes:  No diplopia or blurred vision. ENT:  No earache, sore throat or runny nose.  CARDIOVASCULAR:  No pressure, squeezing, tightness, heaviness or aching about the chest, neck, axilla or epigastrium.  RESPIRATORY:  No cough, shortness of breath, PND or orthopnea.  GASTROINTESTINAL:  No nausea, vomiting or diarrhea.  GENITOURINARY:  No dysuria, frequency or urgency.  MUSCULOSKELETAL:  As per HPI.  SKIN:  No change in skin, hair or nails.  NEUROLOGIC:  No paresthesias, fasciculations, seizures or weakness.  PSYCHIATRIC:  No disorder of thought or mood.  ENDOCRINE:  No heat or cold intolerance, polyuria or polydipsia.  HEMATOLOGICAL:  No easy bruising or bleedings:  .     PHYSICAL EXAMINATION:    GENERAL APPEARANCE:  Pt. is not currently dyspneic, in no distress. Pt. is alert, oriented, and pleasant.  HEENT:  Pupils are normal and react normally. No icterus. Mucous membranes well colored.  NECK:  Supple. No lymphadenopathy. Jugular venous pressure not elevated. Carotids equal.   HEART:   The cardiac impulse has a normal quality. Regular. Normal S1 and S2. There are no murmurs, rubs or gallops noted  CHEST:  Chest is clear to auscultation. Normal respiratory effort.  ABDOMEN:  Soft and nontender.   EXTREMITIES:  There is no cyanosis, clubbing or edema.   SKIN:  No rash or significant lesions are noted.    LABS:                          RADIOLOGY & ADDITIONAL STUDIES:

## 2024-02-17 NOTE — CONSULT NOTE ADULT - SUBJECTIVE AND OBJECTIVE BOX
83 year old man with ETOH abuse, HTN, HLD, admitted on 2/7 with chest/rib pain in setting of chronic cough.  During this admission growing concern for ETOH withdrawal, was placed on CIWA protocol, and had hypoxic respiratory failure in setting of possible COPD exacerbation and was transferred to ICU from 2/9-14.  Now back on medicine, family reporting persistent somnolence, and disorientation.      Patient only able to provide trace collateral.  Denies having any specific complaints of pain or shortness of breath at this time.  His wife and daughter are at the bedside, giving collateral.  They have noticed that he is perseverating this admission, and he is dificulty to redirect at times, for example repeatedly trying to get out of bed to go to bathroom, despite repeated efforts to keep him confined to the bed.  Of note, family noted that he has become acustomed to the orientation questions, and has learned to answer the hospital name, and date.  They denied any focal neurological changes, including facial weakness, or slurred speech, although his voice is very hypophonic.  They denied noticing any motor weakness, or ataxia.  Of note, the wife reports that patient's mental status was baseline at home prior to this admission.  She noticed that he often fell asleep in the evening after consuming alcohol, and at times he would forget somethings, such as the movie they were watching when he fell asleep, but otherwise they believe he has been mentally sharp up to the time of this admission.  He is a well educated person, was a , and has published several books.      On exam:   GEN: NAD.  ONe to one at bedside.   CV: RRR, S1, S2  PULM: CTAB  GI: soft, non tender  EXTREM: no CCE  HEENT: no nuchal rigidity  LYMPH: no cervical lymphadenopathy    NEURO:   Appears fatigued, drowsy, but does wake up and attend the examiner.  He is oriented to year, and hospital.  He knows his daughter and wife's names.  He does perseverate.  He names common objects, and follows bulbar commands, but did not follow appendicular commands.  Palmar mental sign is present on the R.    Pupils 3-2mm, symmetric, full VF's, normal EOM, conjugate gaze, no nystagmus, no facial weakness, tongue midline, palate symmetric.   MOTOR: normal bulk and tone.  No drift.  5/5 throughout to confrontation upper and lower extremities.  No asterixis.    SENSORY: intact symmetrically to light touch.   COORDINATION: normal FNF    CTH images reviewed: no hemorrhage, no large territorial infarct.

## 2024-02-17 NOTE — CONSULT NOTE ADULT - ASSESSMENT
83 year old man with ETOH abuse, HTN, HLD, admitted on 2/7 with chest/rib pain in setting of chronic cough.  Treated this admission for ETOH withdrawal and hypoxic respiratory failure.  Has had clouded sensorium this admission.  ON exam, does have bradyphrenia, and perseveration.  No acute findings on the CT at time of admission.    Overall, most consistent with delirium in setting of ETOH withdrawal, medical illness, and medication effect.    -would readdress indication for any sedating medications, including his quetiapine, ativan, and trazodone.   -labs checked, no deficiency in B12, or abnormality in TSH.  He is being supplemented with thiamine and MV.    -will obtain a repeat CTH and an EEG to rule out any new structural lesion, or electrophysiologic explanation for his change in mental status.    -if no acute findings on the above diagnostic studies, would continue medical care, and lower the delirogenic medications.  Please page or call neurology with any acute neuro changes, or other issues we can help address.      rowing concern for ETOH withdrawal, was placed on CIWA protocol, and had hypoxic respiratory failure in setting of possible COPD exacerbation and was transferred to ICU from 2/9-14.  Now back on medicine, family reporting persistent somnolence, and disorientation.  
82 y/o M with PMHx of arrythmia, diverticulosis, HLD, HTN admitted c/o chest pain/Rib Pain and cough x1 week. Pt reports that the cough is getting worse and is now having a lot of pain on left side of his rib. Torso movement and deep breathing exacerbates pain. Pt suspects he hurt something during coughing fit. Denies vomiting, but endorses nausea. Pt not on o2. Denies any trauma, palpitations, syncop, abd pain or dyspnea. Patient found to have 11th rib fracture and initially admitted to medicine. RRT on 2/9: RRT called on floor patient having SOB, increased work of breathing, and increased agitation. Patient is chronic alcoholic and is having DT's According to family patient has chronic binge drinking and found large quantity of empty Alchohol bottles at pt's home. Patient started on ativan and CIWA protocol today. Patient noted to have significantly worsened agitation and becoming hypoxic, started on Duoneb for suspected COPD/Asthma exacerbation. Patient was agitated pulling off venti mask and increased work of breathing sating 93% patient. CXR show pulmonary edema - transferred to CCU for further management. Pt now improving respiratory wise after receiving IV diuresis for acute pulmonary edema, breathing comfortable on 3L NC. Being treated for alcohol withdrawal with DTs with IV ativan taper, s/p precedex gtt for agitation, on 1:1 for safety and improving. Now stable for downgrade to floors till tired and lethargic appearing but able to answer very simple commands. Denies any HA, CP, SOB. On 2 L of NC. Needs a incentive spirometer. I spoke with patient's son maria esther who states patient is heavy drinker ( many years now). Patient's family has noted cognitive decline, trouble with ambulation, pat seen for pulmonary eval, lathergy thought to be due to meds seroquel & ativan, family @ bedside, also h/o snoring & gaspimg for air during sleep.    PROBLEMS:    Acute hypoxic respiratory failure 2/2 acute pulmonary edema  Cough likely due to bronchitis    Acute metabolic encephalopathy  ETOH abuse with withdrawal c/b DTs  Atypical chest pain 2/2 left 11th rib fracture s/p suspected fall  AMADOR  HTN / HLD     PLAN:    CIWA-A protocol-taper/HOLD seroquel  MV/folic acid/thiamine  Atypical chest pain 2/2 left 11th rib fracture s/p suspected fall-CT chest with posterior left 11th rib fracture-pain control   Acute hypoxic respiratory failure 2/2 acute pulmonary edema/Cough likely due to bronchitis-s/p IV lasix , duonebs with improvement    S/P Treated with 5 days azithromycin, zosyn for possible bronchitis   Sleep study as out-pat  DVT ppx-Lovenox.

## 2024-02-17 NOTE — PROGRESS NOTE ADULT - SUBJECTIVE AND OBJECTIVE BOX
CC: 82 y/o M with PMHx of arrythmia, diverticulosis, HLD, HTN presents to the ED c/o chest pain/Rib Pain and  cough x1 week. Pt reports that the cough is getting worse and is now having a lot of pain on left side of his rib. Torso movement and deep breathing exacerbates pain. Pt suspects he hurt something during coughing fit. Denies vomiting, but endorses nausea. Pt not on o2. Denies any trauma, palpitations, syncope, abd pain or dyspnea. Patient found to have 11th rib fracture and initially admitted to medicine.     Course c/b by RRT on 2/9:  RRT called on floor patient having SOB, increased work of breathing, and increased agitation. Patient is chronic alcoholic and is having DT's According to family patient has chronic binge drinking and found large quantity of empty Alchohol bottles at pt's home. Patient started on ativan and CIWA protocol today. Patient noted to have significantly worsened agitation and becoming hypoxic, started on Duoneb for suspected COPD/Asthma exacerbation. Patient was agitated pulling off venti mask and increased work of breathing sating 93% patient. CXR show pulmonary edema - transferred to CCU for further management.   Pt now improving respiratory wise after receiving IV diuresis for acute pulmonary edema, breathing comfortable on 3L NC. Being treated for alcohol withdrawal with DTs with IV ativan taper, s/p precedex gtt for agitation, on 1:1 for safety and improving. Now stable for downgrade to floors     2/17 - sleepy, easly arousable, got trazdone, ativan etc overnight - wife at bedside     Vital Signs Last 24 Hrs  T(F): 97.9 (17 Feb 2024 17:30), Max: 98.6 (17 Feb 2024 01:59)  HR: 69 (17 Feb 2024 17:30) (69 - 83)  BP: 147/76 (17 Feb 2024 17:30) (113/64 - 163/83)  BP(mean): 99 (17 Feb 2024 06:08) (90 - 99)  RR: 18 (17 Feb 2024 17:30) (16 - 19)  SpO2: 98% (17 Feb 2024 17:30) (94% - 98%)  Constitutional: NAD, sleepy - easily arousable, able to mumble his name   HEENT: PERR, EOMI  Neck: Soft and supple,  No JVD  Respiratory: Breath sounds are clear bilaterally, No wheezing, rales or rhonchi  Cardiovascular: S1 and S2, regular rate and rhythm, no Murmurs  Gastrointestinal: Bowel Sounds present, soft, nontender, nondistended  Extremities: No peripheral edema  Vascular: 2+ peripheral pulses  Neurological: A/O x 1, no focal deficits  Musculoskeletal: 5/5 strength b/l upper and lower extremities  Skin: No rashes    MEDICATIONS:  MEDICATIONS  (STANDING):  atorvastatin 20 milliGRAM(s) Oral at bedtime  budesonide 160 MICROgram(s)/formoterol 4.5 MICROgram(s) Inhaler 2 Puff(s) Inhalation two times a day  enoxaparin Injectable 40 milliGRAM(s) SubCutaneous every 24 hours  folic acid 1 milliGRAM(s) Oral daily  lisinopril 40 milliGRAM(s) Oral daily  LORazepam   Injectable   IV Push   LORazepam   Injectable 0.5 milliGRAM(s) IV Push every 4 hours  metoprolol tartrate 25 milliGRAM(s) Oral two times a day  multivitamin 1 Tablet(s) Oral daily  QUEtiapine 25 milliGRAM(s) Oral <User Schedule>  traZODone 50 milliGRAM(s) Oral at bedtime      LABS: All Labs Reviewed                      12.5   6.53  )-----------( 311      ( 17 Feb 2024 10:25 )             34.9  136  |  106  |  16  ----------------------------<  140<H>  3.7   |  23  |  0.73  Ca    9.5      17 Feb 2024 10:25      RADIOLOGY/EKG:  < from: Xray Chest 1 View- PORTABLE-Routine (Xray Chest 1 View- PORTABLE-Routine in AM.) (02.12.24 @ 07:41) >  IMPRESSION:  Enlarged cardiac silhouette.  Low lung volumes.  Right lower lung linear atelectasis.

## 2024-02-18 LAB
ANION GAP SERPL CALC-SCNC: 5 MMOL/L — SIGNIFICANT CHANGE UP (ref 5–17)
BUN SERPL-MCNC: 15 MG/DL — SIGNIFICANT CHANGE UP (ref 7–23)
CALCIUM SERPL-MCNC: 8.9 MG/DL — SIGNIFICANT CHANGE UP (ref 8.5–10.1)
CHLORIDE SERPL-SCNC: 108 MMOL/L — SIGNIFICANT CHANGE UP (ref 96–108)
CO2 SERPL-SCNC: 24 MMOL/L — SIGNIFICANT CHANGE UP (ref 22–31)
CREAT SERPL-MCNC: 0.66 MG/DL — SIGNIFICANT CHANGE UP (ref 0.5–1.3)
EGFR: 94 ML/MIN/1.73M2 — SIGNIFICANT CHANGE UP
GLUCOSE SERPL-MCNC: 98 MG/DL — SIGNIFICANT CHANGE UP (ref 70–99)
HCT VFR BLD CALC: 34.8 % — LOW (ref 39–50)
HGB BLD-MCNC: 12.5 G/DL — LOW (ref 13–17)
MAGNESIUM SERPL-MCNC: 2.1 MG/DL — SIGNIFICANT CHANGE UP (ref 1.6–2.6)
MCHC RBC-ENTMCNC: 35.9 GM/DL — SIGNIFICANT CHANGE UP (ref 32–36)
MCHC RBC-ENTMCNC: 36.2 PG — HIGH (ref 27–34)
MCV RBC AUTO: 100.9 FL — HIGH (ref 80–100)
PLATELET # BLD AUTO: 332 K/UL — SIGNIFICANT CHANGE UP (ref 150–400)
POTASSIUM SERPL-MCNC: 3.4 MMOL/L — LOW (ref 3.5–5.3)
POTASSIUM SERPL-SCNC: 3.4 MMOL/L — LOW (ref 3.5–5.3)
RBC # BLD: 3.45 M/UL — LOW (ref 4.2–5.8)
RBC # FLD: 12.4 % — SIGNIFICANT CHANGE UP (ref 10.3–14.5)
SODIUM SERPL-SCNC: 137 MMOL/L — SIGNIFICANT CHANGE UP (ref 135–145)
WBC # BLD: 7.39 K/UL — SIGNIFICANT CHANGE UP (ref 3.8–10.5)
WBC # FLD AUTO: 7.39 K/UL — SIGNIFICANT CHANGE UP (ref 3.8–10.5)

## 2024-02-18 PROCEDURE — 99232 SBSQ HOSP IP/OBS MODERATE 35: CPT

## 2024-02-18 RX ORDER — POTASSIUM CHLORIDE 20 MEQ
40 PACKET (EA) ORAL ONCE
Refills: 0 | Status: COMPLETED | OUTPATIENT
Start: 2024-02-18 | End: 2024-02-18

## 2024-02-18 RX ORDER — TRAZODONE HCL 50 MG
25 TABLET ORAL AT BEDTIME
Refills: 0 | Status: DISCONTINUED | OUTPATIENT
Start: 2024-02-18 | End: 2024-02-21

## 2024-02-18 RX ADMIN — Medication 0.5 MILLIGRAM(S): at 05:45

## 2024-02-18 RX ADMIN — LISINOPRIL 40 MILLIGRAM(S): 2.5 TABLET ORAL at 10:59

## 2024-02-18 RX ADMIN — Medication 2 MILLIGRAM(S): at 00:21

## 2024-02-18 RX ADMIN — Medication 1 TABLET(S): at 10:59

## 2024-02-18 RX ADMIN — Medication 1 MILLIGRAM(S): at 11:00

## 2024-02-18 RX ADMIN — Medication 25 MILLIGRAM(S): at 20:39

## 2024-02-18 RX ADMIN — ENOXAPARIN SODIUM 40 MILLIGRAM(S): 100 INJECTION SUBCUTANEOUS at 17:50

## 2024-02-18 RX ADMIN — Medication 650 MILLIGRAM(S): at 20:39

## 2024-02-18 RX ADMIN — BUDESONIDE AND FORMOTEROL FUMARATE DIHYDRATE 2 PUFF(S): 160; 4.5 AEROSOL RESPIRATORY (INHALATION) at 19:36

## 2024-02-18 RX ADMIN — Medication 40 MILLIEQUIVALENT(S): at 13:42

## 2024-02-18 RX ADMIN — ATORVASTATIN CALCIUM 20 MILLIGRAM(S): 80 TABLET, FILM COATED ORAL at 20:38

## 2024-02-18 RX ADMIN — Medication 25 MILLIGRAM(S): at 10:59

## 2024-02-18 RX ADMIN — Medication 2 MILLIGRAM(S): at 20:44

## 2024-02-18 RX ADMIN — BUDESONIDE AND FORMOTEROL FUMARATE DIHYDRATE 2 PUFF(S): 160; 4.5 AEROSOL RESPIRATORY (INHALATION) at 08:13

## 2024-02-18 RX ADMIN — Medication 0.5 MILLIGRAM(S): at 17:49

## 2024-02-18 RX ADMIN — Medication 0.5 MILLIGRAM(S): at 02:48

## 2024-02-18 RX ADMIN — Medication 650 MILLIGRAM(S): at 21:30

## 2024-02-18 NOTE — PROGRESS NOTE ADULT - ASSESSMENT
# Acute metabolic encephalopathy  # ETOH abuse with withdrawal c/b DTs. s/p prcedex  - more awake this morning.   - CIWA-A protocol   - C/W IV ativan taper  - MV/folic acid/thiamine  2/18 cont 1:1 for safety; rpt CT brain per neurology - no chnages; EEG pending   - Trazodone qHS - but redice dose to 25qhs; DC Seroquel     # Atypical chest pain 2/2 left 11th rib fracture s/p suspected fall   - CT head negative   - CT chest with posterior left 11th rib fracture  - troponin neg x1  - EKG chronically abnormal, no significant change from previous EKGs  - Seen by cardiology Dr. De Guzman - no further workup planned  - TTE with preserved LVEF, mild-moderate valvular disease   - pain control     # Acute hypoxic respiratory failure 2/2 acute pulmonary edema  # Cough likely due to bronchitis   - s/p RRT on 2/9 as above  - Repeat CXR with increased vascular congestion   - s/p IV lasix , duonebs with improvement    - C/w supplemental O2, on 3L NC, wean as tolerated  - Cont inhalers   - Treated with 5 days azithromycin, zosyn for possible bronchitis     # HTN / HLD  - BP rising, resumed home lisinopril 40mg QD  - Increase Toprol to 50mg QD  - monitor and adjust meds PRN  - c/w statin    # DVT ppx  - Lovenox.    pt seen with RN and wife Dayana at bedside

## 2024-02-18 NOTE — PROGRESS NOTE ADULT - ASSESSMENT
82 y/o M with PMHx of arrythmia, diverticulosis, HLD, HTN admitted c/o chest pain/Rib Pain and cough x1 week. Pt reports that the cough is getting worse and is now having a lot of pain on left side of his rib. Torso movement and deep breathing exacerbates pain. Pt suspects he hurt something during coughing fit. Denies vomiting, but endorses nausea. Pt not on o2. Denies any trauma, palpitations, syncop, abd pain or dyspnea. Patient found to have 11th rib fracture and initially admitted to medicine. RRT on 2/9: RRT called on floor patient having SOB, increased work of breathing, and increased agitation. Patient is chronic alcoholic and is having DT's According to family patient has chronic binge drinking and found large quantity of empty Alchohol bottles at pt's home. Patient started on ativan and CIWA protocol today. Patient noted to have significantly worsened agitation and becoming hypoxic, started on Duoneb for suspected COPD/Asthma exacerbation. Patient was agitated pulling off venti mask and increased work of breathing sating 93% patient. CXR show pulmonary edema - transferred to CCU for further management. Pt now improving respiratory wise after receiving IV diuresis for acute pulmonary edema, breathing comfortable on 3L NC. Being treated for alcohol withdrawal with DTs with IV ativan taper, s/p precedex gtt for agitation, on 1:1 for safety and improving. Now stable for downgrade to floors till tired and lethargic appearing but able to answer very simple commands. Denies any HA, CP, SOB. On 2 L of NC. Needs a incentive spirometer. I spoke with patient's son maria esther who states patient is heavy drinker ( many years now). Patient's family has noted cognitive decline, trouble with ambulation, pat seen for pulmonary eval, lathergy thought to be due to meds seroquel & ativan, family @ bedside, also h/o snoring & gaspimg for air during sleep.    PROBLEMS:    Acute hypoxic respiratory failure 2/2 acute pulmonary edema  Cough likely due to bronchitis    Acute metabolic encephalopathy  ETOH abuse with withdrawal c/b DTs  Atypical chest pain 2/2 left 11th rib fracture s/p suspected fall  [facility]ADOR  HTN / HLD     PLAN:    pulmonary better  CIWA-A protocol-taper  MV/folic acid/thiamine  Atypical chest pain 2/2 left 11th rib fracture s/p suspected fall-CT chest with posterior left 11th rib fracture-pain control   Acute hypoxic respiratory failure 2/2 acute pulmonary edema/Cough likely due to bronchitis-s/p IV lasix , duonebs with improvement    S/P Treated with 5 days azithromycin, zosyn for possible bronchitis   Sleep study as out-pat  DVT ppx-Lovenox.

## 2024-02-18 NOTE — PROGRESS NOTE ADULT - SUBJECTIVE AND OBJECTIVE BOX
Subjective:    pat better, lying in bed, no new complaint, family @ bedside.    Home Medications:  atorvastatin 20 mg oral tablet: 1 tab(s) orally once a day (08 Feb 2024 09:01)  cyanocobalamin 1000 mcg oral tablet: 1 tab(s) orally once a day (08 Feb 2024 08:59)  folic acid 0.8 mg oral tablet: 1 tab(s) orally once a day (08 Feb 2024 08:59)  lisinopril 40 mg oral tablet: 1 tab(s) orally once a day (08 Feb 2024 08:59)  metoprolol succinate 25 mg oral tablet, extended release: 1 tab(s) orally once a day (08 Feb 2024 09:01)  traZODone 100 mg oral tablet: 1 tab(s) orally once a day (at bedtime) (08 Feb 2024 08:58)    MEDICATIONS  (STANDING):  atorvastatin 20 milliGRAM(s) Oral at bedtime  budesonide 160 MICROgram(s)/formoterol 4.5 MICROgram(s) Inhaler 2 Puff(s) Inhalation two times a day  enoxaparin Injectable 40 milliGRAM(s) SubCutaneous every 24 hours  folic acid 1 milliGRAM(s) Oral daily  lisinopril 40 milliGRAM(s) Oral daily  LORazepam   Injectable   IV Push   LORazepam   Injectable 0.5 milliGRAM(s) IV Push every 12 hours  metoprolol tartrate 25 milliGRAM(s) Oral two times a day  multivitamin 1 Tablet(s) Oral daily  potassium chloride   Powder 40 milliEquivalent(s) Oral once  traZODone 50 milliGRAM(s) Oral at bedtime  traZODone 25 milliGRAM(s) Oral at bedtime    MEDICATIONS  (PRN):  acetaminophen     Tablet .. 650 milliGRAM(s) Oral every 6 hours PRN Temp greater or equal to 38C (100.4F), Mild Pain (1 - 3)  albuterol    90 MICROgram(s) HFA Inhaler 2 Puff(s) Inhalation every 6 hours PRN Shortness of Breath and/or Wheezing  aluminum hydroxide/magnesium hydroxide/simethicone Suspension 30 milliLiter(s) Oral every 4 hours PRN Dyspepsia  bisacodyl Suppository 10 milliGRAM(s) Rectal daily PRN Constipation  LORazepam   Injectable 2 milliGRAM(s) IV Push every 2 hours PRN Symptom-triggered: 2 point increase in CIWA -Ar score and a total score of 7 or LESS  ondansetron Injectable 4 milliGRAM(s) IV Push once PRN Nausea and/or Vomiting  polyethylene glycol 3350 17 Gram(s) Oral at bedtime PRN Constipation      Allergies    No Known Allergies    Intolerances        Vital Signs Last 24 Hrs  T(C): 37 (18 Feb 2024 10:58), Max: 37 (18 Feb 2024 10:58)  T(F): 98.6 (18 Feb 2024 10:58), Max: 98.6 (18 Feb 2024 10:58)  HR: 69 (18 Feb 2024 10:58) (67 - 95)  BP: 132/74 (18 Feb 2024 10:58) (130/68 - 155/79)  BP(mean): --  RR: 18 (18 Feb 2024 10:58) (17 - 19)  SpO2: 97% (18 Feb 2024 10:58) (95% - 100%)    Parameters below as of 18 Feb 2024 10:58  Patient On (Oxygen Delivery Method): room air          PHYSICAL EXAMINATION:    NECK:  Supple. No lymphadenopathy. Jugular venous pressure not elevated. Carotids equal.   HEART:   The cardiac impulse has a normal quality. Reg., Nl S1 and S2.  There are no murmurs, rubs or gallops noted  CHEST:  Chest crackles to auscultation. Normal respiratory effort.  ABDOMEN:  Soft and nontender.   EXTREMITIES:  There is no edema.       LABS:                        12.5   7.39  )-----------( 332      ( 18 Feb 2024 07:48 )             34.8     02-18    137  |  108  |  15  ----------------------------<  98  3.4<L>   |  24  |  0.66    Ca    8.9      18 Feb 2024 07:48  Mg     2.1     02-18        Urinalysis Basic - ( 18 Feb 2024 07:48 )    Color: x / Appearance: x / SG: x / pH: x  Gluc: 98 mg/dL / Ketone: x  / Bili: x / Urobili: x   Blood: x / Protein: x / Nitrite: x   Leuk Esterase: x / RBC: x / WBC x   Sq Epi: x / Non Sq Epi: x / Bacteria: x

## 2024-02-18 NOTE — PROGRESS NOTE ADULT - SUBJECTIVE AND OBJECTIVE BOX
CC: 84 y/o M with PMHx of arrythmia, diverticulosis, HLD, HTN presents to the ED c/o chest pain/Rib Pain and  cough x1 week. Pt reports that the cough is getting worse and is now having a lot of pain on left side of his rib. Torso movement and deep breathing exacerbates pain. Pt suspects he hurt something during coughing fit. Denies vomiting, but endorses nausea. Pt not on o2. Denies any trauma, palpitations, syncope, abd pain or dyspnea. Patient found to have 11th rib fracture and initially admitted to medicine.     Course c/b by RRT on 2/9:  RRT called on floor patient having SOB, increased work of breathing, and increased agitation. Patient is chronic alcoholic and is having DT's According to family patient has chronic binge drinking and found large quantity of empty Alchohol bottles at pt's home. Patient started on ativan and CIWA protocol today. Patient noted to have significantly worsened agitation and becoming hypoxic, started on Duoneb for suspected COPD/Asthma exacerbation. Patient was agitated pulling off venti mask and increased work of breathing sating 93% patient. CXR show pulmonary edema - transferred to CCU for further management.   Pt now improving respiratory wise after receiving IV diuresis for acute pulmonary edema, breathing comfortable on 3L NC. Being treated for alcohol withdrawal with DTs with IV ativan taper, s/p precedex gtt for agitation, on 1:1 for safety and improving. Now stable for downgrade to floors     2/17 - sleepy, easly arousable, got trazdone, ativan etc overnight - wife at bedside   2/18 - was agitated last night and got seroquel trazodone and this morning got ativan - but more easily arousable and responsive today  family at bedside     Vital Signs Last 24 Hrs  T(F): 97.5 (18 Feb 2024 16:45), Max: 98.6 (18 Feb 2024 10:58)  HR: 84 (18 Feb 2024 17:52) (58 - 95)  BP: 165/95 (18 Feb 2024 17:52) (132/74 - 165/95)  RR: 18 (18 Feb 2024 16:45) (17 - 19)  SpO2: 98% (18 Feb 2024 16:45) (95% - 100%)  Patient On (Oxygen Delivery Method): room air  Constitutional: NAD, sleepy - easily arousable, able to mumble his name   HEENT: PERR, EOMI  Neck: Soft and supple,  No JVD  Respiratory: Breath sounds are clear bilaterally, No wheezing, rales or rhonchi  Cardiovascular: S1 and S2, regular rate and rhythm, no Murmurs  Gastrointestinal: Bowel Sounds present, soft, nontender, nondistended  Extremities: No peripheral edema  Vascular: 2+ peripheral pulses  Neurological: A/O x 1, no focal deficits  Musculoskeletal: 5/5 strength b/l upper and lower extremities  Skin: No rashes    RADIOLOGY/EKG:  < from: Xray Chest 1 View- PORTABLE-Routine (Xray Chest 1 View- PORTABLE-Routine in AM.) (02.12.24 @ 07:41) >  IMPRESSION:  Enlarged cardiac silhouette.  Low lung volumes.  Right lower lung linear atelectasis.    LABS: All Labs Reviewed:                        12.5   7.39  )-----------( 332      ( 18 Feb 2024 07:48 )             34.8     02-18    137  |  108  |  15  ----------------------------<  98  3.4<L>   |  24  |  0.66    Ca    8.9      18 Feb 2024 07:48  Mg     2.1     02-18      MEDS:   acetaminophen     Tablet .. 650 milliGRAM(s) Oral every 6 hours PRN  albuterol    90 MICROgram(s) HFA Inhaler 2 Puff(s) Inhalation every 6 hours PRN  aluminum hydroxide/magnesium hydroxide/simethicone Suspension 30 milliLiter(s) Oral every 4 hours PRN  atorvastatin 20 milliGRAM(s) Oral at bedtime  bisacodyl Suppository 10 milliGRAM(s) Rectal daily PRN  budesonide 160 MICROgram(s)/formoterol 4.5 MICROgram(s) Inhaler 2 Puff(s) Inhalation two times a day  enoxaparin Injectable 40 milliGRAM(s) SubCutaneous every 24 hours  folic acid 1 milliGRAM(s) Oral daily  lisinopril 40 milliGRAM(s) Oral daily  LORazepam   Injectable 2 milliGRAM(s) IV Push every 2 hours PRN  LORazepam   Injectable 0.5 milliGRAM(s) IV Push every 12 hours  LORazepam   Injectable   IV Push   metoprolol tartrate 25 milliGRAM(s) Oral two times a day  multivitamin 1 Tablet(s) Oral daily  ondansetron Injectable 4 milliGRAM(s) IV Push once PRN  polyethylene glycol 3350 17 Gram(s) Oral at bedtime PRN  traZODone 50 milliGRAM(s) Oral at bedtime  traZODone 25 milliGRAM(s) Oral at bedtime

## 2024-02-19 PROCEDURE — 99232 SBSQ HOSP IP/OBS MODERATE 35: CPT

## 2024-02-19 PROCEDURE — 95816 EEG AWAKE AND DROWSY: CPT | Mod: 26

## 2024-02-19 RX ADMIN — BUDESONIDE AND FORMOTEROL FUMARATE DIHYDRATE 2 PUFF(S): 160; 4.5 AEROSOL RESPIRATORY (INHALATION) at 22:34

## 2024-02-19 RX ADMIN — Medication 1 MILLIGRAM(S): at 11:13

## 2024-02-19 RX ADMIN — Medication 2 MILLIGRAM(S): at 20:45

## 2024-02-19 RX ADMIN — ENOXAPARIN SODIUM 40 MILLIGRAM(S): 100 INJECTION SUBCUTANEOUS at 17:46

## 2024-02-19 RX ADMIN — Medication 0.5 MILLIGRAM(S): at 05:56

## 2024-02-19 RX ADMIN — Medication 0.5 MILLIGRAM(S): at 17:46

## 2024-02-19 RX ADMIN — Medication 25 MILLIGRAM(S): at 21:52

## 2024-02-19 RX ADMIN — Medication 2 MILLIGRAM(S): at 01:44

## 2024-02-19 RX ADMIN — LISINOPRIL 40 MILLIGRAM(S): 2.5 TABLET ORAL at 11:13

## 2024-02-19 RX ADMIN — Medication 1 TABLET(S): at 11:12

## 2024-02-19 RX ADMIN — Medication 25 MILLIGRAM(S): at 21:53

## 2024-02-19 RX ADMIN — Medication 25 MILLIGRAM(S): at 11:13

## 2024-02-19 RX ADMIN — ATORVASTATIN CALCIUM 20 MILLIGRAM(S): 80 TABLET, FILM COATED ORAL at 21:52

## 2024-02-19 NOTE — PROGRESS NOTE ADULT - ASSESSMENT
# Acute metabolic encephalopathy  # ETOH abuse with withdrawal c/b DTs. s/p prcedex  - more awake this morning.   - CIWA-A protocol   - C/W IV ativan taper  - MV/folic acid/thiamine  2/18 cont 1:1 for safety; rpt CT brain per neurology - no changes; EEG pending   - Trazodone qHS - but reduce dose to 25qhs; DC Seroquel   2/19 - cont 1:1 today, can tucker dc tmr   rpt CT unremarkable  EEG done today - unremarkable  seems to be doing better with lower doses of trazodone and off seroquel       # Atypical chest pain 2/2 left 11th rib fracture s/p suspected fall   - CT head negative   - CT chest with posterior left 11th rib fracture  - troponin neg x1  - EKG chronically abnormal, no significant change from previous EKGs  - Seen by cardiology Dr. De Guzman - no further workup planned  - TTE with preserved LVEF, mild-moderate valvular disease   - pain control     # Acute hypoxic respiratory failure 2/2 acute pulmonary edema  # Cough likely due to bronchitis   - s/p RRT on 2/9 as above  - Repeat CXR with increased vascular congestion   - s/p IV lasix , duonebs with improvement    - C/w supplemental O2, on 3L NC, wean as tolerated  - Cont inhalers   - Treated with 5 days azithromycin, zosyn for possible bronchitis     # HTN / HLD  - BP rising, resumed home lisinopril 40mg QD  - Increase Toprol to 50mg QD  - monitor and adjust meds PRN  - c/w statin    # DVT ppx  - Lovenox.    2/19 DISPO pt seen with RN and wife Dayana at bedside   Needs to be more awake for discharge and work with PT   will write for hospital bed in the interim   DC 1:1 tmr 2-20  JULIANA ceballos on 2-21     HOSPITAL BED   Patient with reduced mobility, deconditioning s/p ICU stay on precedex drip   For now patient needs a hospitable bed as he requires frequent repositioning that is not possible with an ordinary bed.   Patient is unable to get OOB without assistance at this time and will require gel overlay also.  Recommend elevation of HOB to 30 degrees at least to reduce the risk of aspiration and for pain management.     # Acute metabolic encephalopathy  # ETOH abuse with withdrawal c/b DTs. s/p prcedex  - more awake this morning.   - CIWA-A protocol   - C/W IV ativan taper  - MV/folic acid/thiamine  2/18 cont 1:1 for safety; rpt CT brain per neurology - no changes; EEG pending   - Trazodone qHS - but reduce dose to 25qhs; DC Seroquel   2/19 - cont 1:1 today, can tucker dc tmr   rpt CT unremarkable  EEG done today - slowing noted but unremarkable for Sz activity; irr HR while on EEG - will get EKG for AM   seems to be doing better with lower doses of trazodone and off seroquel       # Atypical chest pain 2/2 left 11th rib fracture s/p suspected fall   - CT head negative   - CT chest with posterior left 11th rib fracture  - troponin neg x1  - EKG chronically abnormal, no significant change from previous EKGs  - Seen by cardiology Dr. De Guzman - no further workup planned  - TTE with preserved LVEF, mild-moderate valvular disease   - pain control     # Acute hypoxic respiratory failure 2/2 acute pulmonary edema  # Cough likely due to bronchitis   - s/p RRT on 2/9 as above  - Repeat CXR with increased vascular congestion   - s/p IV lasix , duonebs with improvement    - C/w supplemental O2, on 3L NC, wean as tolerated  - Cont inhalers   - Treated with 5 days azithromycin, zosyn for possible bronchitis     # HTN / HLD  - BP rising, resumed home lisinopril 40mg QD  - Increase Toprol to 50mg QD  - monitor and adjust meds PRN  - c/w statin    # DVT ppx  - Lovenox.    2/19 DISPO pt seen with RN and wife Dayana at bedside   Needs to be more awake for discharge and work with PT   will write for hospital bed in the interim   follow-up EKG in AM   DC 1:1 tmr 2-20  DC tucker on 2-21     HOSPITAL BED   Patient with reduced mobility, deconditioning s/p ICU stay on precedex drip   For now patient needs a hospitable bed as he requires frequent repositioning that is not possible with an ordinary bed.   Patient is unable to get OOB without assistance at this time and will require gel overlay also.  Recommend elevation of HOB to 30 degrees at least to reduce the risk of aspiration and for pain management.

## 2024-02-19 NOTE — EEG REPORT - NS EEG TEXT BOX
*** [facility] ***   COMPREHENSIVE EPILEPSY CENTER   REPORT OF ROUTINE VIDEO EEG     [facility]: [address], [place] [address], Ph#: [phone]  [facility]: [id number], [place] [address], Ph#: [phone]  [facility]: [address] [place] [address], Ph#: [phone]    Patient Name: OSKAR IBARRA  Age and : 83y ([birth year])  MRN #: [id number]  Location: [place] [address]  Referring Physician: Magda Lancaster    Study Date: 24    _____________________________________________________________  TECHNICAL INFORMATION    Placement and Labeling of Electrodes:  The EEG was performed utilizing 20 channels referential EEG connections (coronal over temporal over parasagittal montage) using all standard 10-20 electrode placements with EKG.  Recording was at a sampling rate of 256 samples per second per channel.  Time synchronized digital video recording was done simultaneously with EEG recording.  A low light infrared camera was used for low light recording.  Gera and seizure detection algorithms were utilized.    _____________________________________________________________  HISTORY    Patient is a 83y old  Male who presents with a chief complaint of Left sided rib pain and cough; AMS (2024 18:38)      PERTINENT MEDICATION:  MEDICATIONS  (STANDING):  atorvastatin 20 milliGRAM(s) Oral at bedtime  budesonide 160 MICROgram(s)/formoterol 4.5 MICROgram(s) Inhaler 2 Puff(s) Inhalation two times a day  enoxaparin Injectable 40 milliGRAM(s) SubCutaneous every 24 hours  folic acid 1 milliGRAM(s) Oral daily  lisinopril 40 milliGRAM(s) Oral daily  LORazepam   Injectable 0.5 milliGRAM(s) IV Push every 12 hours  LORazepam   Injectable   IV Push   metoprolol tartrate 25 milliGRAM(s) Oral two times a day  multivitamin 1 Tablet(s) Oral daily  traZODone 25 milliGRAM(s) Oral at bedtime    _____________________________________________________________  STUDY INTERPRETATION    Findings: The background was continuous and reactive. During wakefulness, the posterior dominant rhythm consisted of symmetric, poorly-modulated 6.5Hz activity.    Background Slowing:  Continuous diffuse irregular theta/delta activity.    Focal Slowing:   None were present.    Sleep Background:  Drowsiness was characterized by fragmentation, attenuation, and slowing of the background activity.    Stage II sleep transients were not recorded.    Other Non-Epileptiform Findings:  None were present.    Interictal Epileptiform Activity:   None were present.    Events:  Clinical events: None recorded.  Seizures: None recorded.    Activation Procedures:   Hyperventilation was not performed.    Photic stimulation was performed and did not elicit any abnormality.     Artifacts:  Intermittent myogenic and movement artifacts were noted.    ECG:  The heart rate on single channel ECG was predominantly between 60-80 BPM, irregular at times.    _____________________________________________________________  **EEG SUMMARY/CLASSIFICATION**    Abnormal EEG in an encephalopathic patient.  - Background slowing, generalized.  - Irregular heart rate noted, incidentally.  __________________________________________________________  **EEG IMPRESSION/CLINICAL CORRELATE**    Abnormal EEG study.  - Mild to moderate nonspecific diffuse or multifocal cerebral dysfunction.   - Incidentally noted irregular heart rate at times. Correlate with 12-lead EKG and clinically.  - No epileptiform patterns or seizures recorded.  _____________________________________________________________    Bruce Iglesias MD, BUNNY  Attending Physician, [facility]  ------------------------------------  EEG Reading Room: [address] [place] [address]  Epilepsy Answering Service after 5PM and before 8:30AM: Ph#: [phone]  Also reachable via TEAMS

## 2024-02-19 NOTE — PROGRESS NOTE ADULT - SUBJECTIVE AND OBJECTIVE BOX
Subjective:    pat better, EEG today, no respiratory distress.    Home Medications:  atorvastatin 20 mg oral tablet: 1 tab(s) orally once a day (08 Feb 2024 09:01)  cyanocobalamin 1000 mcg oral tablet: 1 tab(s) orally once a day (08 Feb 2024 08:59)  folic acid 0.8 mg oral tablet: 1 tab(s) orally once a day (08 Feb 2024 08:59)  lisinopril 40 mg oral tablet: 1 tab(s) orally once a day (08 Feb 2024 08:59)  metoprolol succinate 25 mg oral tablet, extended release: 1 tab(s) orally once a day (08 Feb 2024 09:01)  traZODone 100 mg oral tablet: 1 tab(s) orally once a day (at bedtime) (08 Feb 2024 08:58)    MEDICATIONS  (STANDING):  atorvastatin 20 milliGRAM(s) Oral at bedtime  budesonide 160 MICROgram(s)/formoterol 4.5 MICROgram(s) Inhaler 2 Puff(s) Inhalation two times a day  enoxaparin Injectable 40 milliGRAM(s) SubCutaneous every 24 hours  folic acid 1 milliGRAM(s) Oral daily  lisinopril 40 milliGRAM(s) Oral daily  LORazepam   Injectable   IV Push   LORazepam   Injectable 0.5 milliGRAM(s) IV Push every 12 hours  metoprolol tartrate 25 milliGRAM(s) Oral two times a day  multivitamin 1 Tablet(s) Oral daily  traZODone 25 milliGRAM(s) Oral at bedtime    MEDICATIONS  (PRN):  acetaminophen     Tablet .. 650 milliGRAM(s) Oral every 6 hours PRN Temp greater or equal to 38C (100.4F), Mild Pain (1 - 3)  albuterol    90 MICROgram(s) HFA Inhaler 2 Puff(s) Inhalation every 6 hours PRN Shortness of Breath and/or Wheezing  aluminum hydroxide/magnesium hydroxide/simethicone Suspension 30 milliLiter(s) Oral every 4 hours PRN Dyspepsia  bisacodyl Suppository 10 milliGRAM(s) Rectal daily PRN Constipation  LORazepam   Injectable 2 milliGRAM(s) IV Push every 2 hours PRN Symptom-triggered: 2 point increase in CIWA -Ar score and a total score of 7 or LESS  ondansetron Injectable 4 milliGRAM(s) IV Push once PRN Nausea and/or Vomiting  polyethylene glycol 3350 17 Gram(s) Oral at bedtime PRN Constipation      Allergies    No Known Allergies    Intolerances        Vital Signs Last 24 Hrs  T(C): 36.4 (19 Feb 2024 08:07), Max: 36.8 (18 Feb 2024 20:25)  T(F): 97.5 (19 Feb 2024 08:07), Max: 98.2 (18 Feb 2024 20:25)  HR: 86 (19 Feb 2024 08:07) (58 - 107)  BP: 162/65 (19 Feb 2024 08:07) (119/92 - 165/95)  BP(mean): --  RR: 19 (19 Feb 2024 08:07) (18 - 21)  SpO2: 97% (19 Feb 2024 08:07) (96% - 99%)    Parameters below as of 19 Feb 2024 08:07  Patient On (Oxygen Delivery Method): room air          PHYSICAL EXAMINATION:    NECK:  Supple. No lymphadenopathy. Jugular venous pressure not elevated. Carotids equal.   HEART:   The cardiac impulse has a normal quality. Reg., Nl S1 and S2.  There are no murmurs, rubs or gallops noted  CHEST:  Chest rhonchi to auscultation. Normal respiratory effort.  ABDOMEN:  Soft and nontender.   EXTREMITIES:  There is no edema.       LABS:                        12.5   7.39  )-----------( 332      ( 18 Feb 2024 07:48 )             34.8     02-18    137  |  108  |  15  ----------------------------<  98  3.4<L>   |  24  |  0.66    Ca    8.9      18 Feb 2024 07:48  Mg     2.1     02-18        Urinalysis Basic - ( 18 Feb 2024 07:48 )    Color: x / Appearance: x / SG: x / pH: x  Gluc: 98 mg/dL / Ketone: x  / Bili: x / Urobili: x   Blood: x / Protein: x / Nitrite: x   Leuk Esterase: x / RBC: x / WBC x   Sq Epi: x / Non Sq Epi: x / Bacteria: x

## 2024-02-19 NOTE — PROGRESS NOTE ADULT - SUBJECTIVE AND OBJECTIVE BOX
CC: 82 y/o M with PMHx of arrythmia, diverticulosis, HLD, HTN presents to the ED c/o chest pain/Rib Pain and  cough x1 week. Pt reports that the cough is getting worse and is now having a lot of pain on left side of his rib. Torso movement and deep breathing exacerbates pain. Pt suspects he hurt something during coughing fit. Denies vomiting, but endorses nausea. Pt not on o2. Denies any trauma, palpitations, syncope, abd pain or dyspnea. Patient found to have 11th rib fracture and initially admitted to medicine.     Course c/b by RRT on 2/9:  RRT called on floor patient having SOB, increased work of breathing, and increased agitation. Patient is chronic alcoholic and is having DT's According to family patient has chronic binge drinking and found large quantity of empty Alchohol bottles at pt's home. Patient started on ativan and CIWA protocol today. Patient noted to have significantly worsened agitation and becoming hypoxic, started on Duoneb for suspected COPD/Asthma exacerbation. Patient was agitated pulling off venti mask and increased work of breathing sating 93% patient. CXR show pulmonary edema - transferred to CCU for further management.   Pt now improving respiratory wise after receiving IV diuresis for acute pulmonary edema, breathing comfortable on 3L NC. Being treated for alcohol withdrawal with DTs with IV ativan taper, s/p precedex gtt for agitation, on 1:1 for safety and improving. Now stable for downgrade to floors     2/17 - sleepy, easly arousable, got trazdone, ativan etc overnight - wife at bedside   2/18 - was agitated last night and got seroquel trazodone and this morning got ativan - but more easily arousable and responsive today  family at bedside  2/19 - becoming more awake, wife at bedside; got EEG this morning      Vital Signs Last 24 Hrs  T(C): 36.8 (19 Feb 2024 20:56), Max: 37 (19 Feb 2024 15:48)  T(F): 98.2 (19 Feb 2024 20:56), Max: 98.6 (19 Feb 2024 15:48)  HR: 112 (19 Feb 2024 20:56) (67 - 112)  BP: 152/95 (19 Feb 2024 20:56) (119/92 - 162/65)  RR: 18 (19 Feb 2024 20:56) (18 - 20)  SpO2: 99% (19 Feb 2024 20:56) (96% - 99%)  Parameters below as of 19 Feb 2024 17:45  Patient On (Oxygen Delivery Method): room air  Constitutional: NAD, sleepy - easily arousable, able to mumble his name   HEENT: PERR, EOMI  Neck: Soft and supple,  No JVD  Respiratory: Breath sounds are clear bilaterally, No wheezing, rales or rhonchi  Cardiovascular: S1 and S2, regular rate and rhythm, no Murmurs  Gastrointestinal: Bowel Sounds present, soft, nontender, nondistended  Extremities: No peripheral edema  Vascular: 2+ peripheral pulses  Neurological: A/O x 2, no focal deficits  Musculoskeletal: 5/5 strength b/l upper and lower extremities  Skin: No rashes    RADIOLOGY/EKG:  < from: Xray Chest 1 View- PORTABLE-Routine (Xray Chest 1 View- PORTABLE-Routine in AM.) (02.12.24 @ 07:41) >  IMPRESSION:  Enlarged cardiac silhouette.  Low lung volumes.  Right lower lung linear atelectasis.    LABS: All Labs Reviewed:                        12.5   7.39  )-----------( 332      ( 18 Feb 2024 07:48 )             34.8     137  |  108  |  15  ----------------------------<  98  3.4<L>   |  24  |  0.66    Ca    8.9      18 Feb 2024 07:48  Mg     2.1     02-18        acetaminophen     Tablet .. 650 milliGRAM(s) Oral every 6 hours PRN  albuterol    90 MICROgram(s) HFA Inhaler 2 Puff(s) Inhalation every 6 hours PRN  aluminum hydroxide/magnesium hydroxide/simethicone Suspension 30 milliLiter(s) Oral every 4 hours PRN  atorvastatin 20 milliGRAM(s) Oral at bedtime  bisacodyl Suppository 10 milliGRAM(s) Rectal daily PRN  budesonide 160 MICROgram(s)/formoterol 4.5 MICROgram(s) Inhaler 2 Puff(s) Inhalation two times a day  enoxaparin Injectable 40 milliGRAM(s) SubCutaneous every 24 hours  folic acid 1 milliGRAM(s) Oral daily  lisinopril 40 milliGRAM(s) Oral daily  LORazepam   Injectable 2 milliGRAM(s) IV Push every 2 hours PRN  LORazepam   Injectable 0.5 milliGRAM(s) IV Push every 12 hours  LORazepam   Injectable   IV Push   metoprolol tartrate 25 milliGRAM(s) Oral two times a day  multivitamin 1 Tablet(s) Oral daily  ondansetron Injectable 4 milliGRAM(s) IV Push once PRN  polyethylene glycol 3350 17 Gram(s) Oral at bedtime PRN  traZODone 25 milliGRAM(s) Oral at bedtime

## 2024-02-19 NOTE — PROGRESS NOTE ADULT - ASSESSMENT
82 y/o M with PMHx of arrythmia, diverticulosis, HLD, HTN admitted c/o chest pain/Rib Pain and cough x1 week. Pt reports that the cough is getting worse and is now having a lot of pain on left side of his rib. Torso movement and deep breathing exacerbates pain. Pt suspects he hurt something during coughing fit. Denies vomiting, but endorses nausea. Pt not on o2. Denies any trauma, palpitations, syncop, abd pain or dyspnea. Patient found to have 11th rib fracture and initially admitted to medicine. RRT on 2/9: RRT called on floor patient having SOB, increased work of breathing, and increased agitation. Patient is chronic alcoholic and is having DT's According to family patient has chronic binge drinking and found large quantity of empty Alchohol bottles at pt's home. Patient started on ativan and CIWA protocol today. Patient noted to have significantly worsened agitation and becoming hypoxic, started on Duoneb for suspected COPD/Asthma exacerbation. Patient was agitated pulling off venti mask and increased work of breathing sating 93% patient. CXR show pulmonary edema - transferred to CCU for further management. Pt now improving respiratory wise after receiving IV diuresis for acute pulmonary edema, breathing comfortable on 3L NC. Being treated for alcohol withdrawal with DTs with IV ativan taper, s/p precedex gtt for agitation, on 1:1 for safety and improving. Now stable for downgrade to floors till tired and lethargic appearing but able to answer very simple commands. Denies any HA, CP, SOB. On 2 L of NC. Needs a incentive spirometer. I spoke with patient's son maria esther who states patient is heavy drinker ( many years now). Patient's family has noted cognitive decline, trouble with ambulation, pat seen for pulmonary eval, lathergy thought to be due to meds seroquel & ativan, family @ bedside, also h/o snoring & gaspimg for air during sleep.    PROBLEMS:    Acute hypoxic respiratory failure 2/2 acute pulmonary edema  Cough likely due to bronchitis    Acute metabolic encephalopathy  ETOH abuse with withdrawal c/b DTs  Atypical chest pain 2/2 left 11th rib fracture s/p suspected fall  AMADOR  HTN / HLD     PLAN:    pulmonary better  CIWA-A protocol-taper  MV/folic acid/thiamine  Atypical chest pain 2/2 left 11th rib fracture s/p suspected fall-CT chest with posterior left 11th rib fracture-pain control   Acute hypoxic respiratory failure 2/2 acute pulmonary edema/Cough likely due to bronchitis-s/p IV lasix, duonebs with improvement    S/P Treated with 5 days azithromycin, zosyn for possible bronchitis   Sleep study as out-pat  DVT ppx-Lovenox.

## 2024-02-20 LAB
AMMONIA BLD-MCNC: <10 UMOL/L — LOW (ref 11–32)
ANION GAP SERPL CALC-SCNC: 4 MMOL/L — LOW (ref 5–17)
BUN SERPL-MCNC: 24 MG/DL — HIGH (ref 7–23)
CALCIUM SERPL-MCNC: 8.6 MG/DL — SIGNIFICANT CHANGE UP (ref 8.5–10.1)
CHLORIDE SERPL-SCNC: 110 MMOL/L — HIGH (ref 96–108)
CO2 SERPL-SCNC: 25 MMOL/L — SIGNIFICANT CHANGE UP (ref 22–31)
CREAT SERPL-MCNC: 0.73 MG/DL — SIGNIFICANT CHANGE UP (ref 0.5–1.3)
EGFR: 91 ML/MIN/1.73M2 — SIGNIFICANT CHANGE UP
GLUCOSE SERPL-MCNC: 104 MG/DL — HIGH (ref 70–99)
HCT VFR BLD CALC: 35.4 % — LOW (ref 39–50)
HGB BLD-MCNC: 12.4 G/DL — LOW (ref 13–17)
MAGNESIUM SERPL-MCNC: 1.9 MG/DL — SIGNIFICANT CHANGE UP (ref 1.6–2.6)
MCHC RBC-ENTMCNC: 35 GM/DL — SIGNIFICANT CHANGE UP (ref 32–36)
MCHC RBC-ENTMCNC: 35.4 PG — HIGH (ref 27–34)
MCV RBC AUTO: 101.1 FL — HIGH (ref 80–100)
PLATELET # BLD AUTO: 359 K/UL — SIGNIFICANT CHANGE UP (ref 150–400)
POTASSIUM SERPL-MCNC: 3.3 MMOL/L — LOW (ref 3.5–5.3)
POTASSIUM SERPL-SCNC: 3.3 MMOL/L — LOW (ref 3.5–5.3)
RBC # BLD: 3.5 M/UL — LOW (ref 4.2–5.8)
RBC # FLD: 12.6 % — SIGNIFICANT CHANGE UP (ref 10.3–14.5)
SODIUM SERPL-SCNC: 139 MMOL/L — SIGNIFICANT CHANGE UP (ref 135–145)
WBC # BLD: 8.83 K/UL — SIGNIFICANT CHANGE UP (ref 3.8–10.5)
WBC # FLD AUTO: 8.83 K/UL — SIGNIFICANT CHANGE UP (ref 3.8–10.5)

## 2024-02-20 PROCEDURE — 99233 SBSQ HOSP IP/OBS HIGH 50: CPT

## 2024-02-20 PROCEDURE — 93010 ELECTROCARDIOGRAM REPORT: CPT

## 2024-02-20 PROCEDURE — 99232 SBSQ HOSP IP/OBS MODERATE 35: CPT

## 2024-02-20 RX ORDER — POTASSIUM CHLORIDE 20 MEQ
40 PACKET (EA) ORAL ONCE
Refills: 0 | Status: COMPLETED | OUTPATIENT
Start: 2024-02-20 | End: 2024-02-20

## 2024-02-20 RX ADMIN — Medication 650 MILLIGRAM(S): at 21:17

## 2024-02-20 RX ADMIN — Medication 1 MILLIGRAM(S): at 09:57

## 2024-02-20 RX ADMIN — Medication 25 MILLIGRAM(S): at 21:18

## 2024-02-20 RX ADMIN — Medication 2 MILLIGRAM(S): at 01:19

## 2024-02-20 RX ADMIN — Medication 1 TABLET(S): at 09:57

## 2024-02-20 RX ADMIN — Medication 0.5 MILLIGRAM(S): at 06:25

## 2024-02-20 RX ADMIN — Medication 25 MILLIGRAM(S): at 09:57

## 2024-02-20 RX ADMIN — Medication 650 MILLIGRAM(S): at 21:47

## 2024-02-20 RX ADMIN — Medication 40 MILLIEQUIVALENT(S): at 12:37

## 2024-02-20 RX ADMIN — ATORVASTATIN CALCIUM 20 MILLIGRAM(S): 80 TABLET, FILM COATED ORAL at 21:18

## 2024-02-20 RX ADMIN — ENOXAPARIN SODIUM 40 MILLIGRAM(S): 100 INJECTION SUBCUTANEOUS at 18:28

## 2024-02-20 RX ADMIN — LISINOPRIL 40 MILLIGRAM(S): 2.5 TABLET ORAL at 09:57

## 2024-02-20 NOTE — PROGRESS NOTE ADULT - SUBJECTIVE AND OBJECTIVE BOX
CC: 84 y/o M with PMHx of arrythmia, diverticulosis, HLD, HTN presents to the ED c/o chest pain/Rib Pain and  cough x1 week. Pt reports that the cough is getting worse and is now having a lot of pain on left side of his rib. Torso movement and deep breathing exacerbates pain. Pt suspects he hurt something during coughing fit. Denies vomiting, but endorses nausea. Pt not on o2. Denies any trauma, palpitations, syncope, abd pain or dyspnea. Patient found to have 11th rib fracture and initially admitted to medicine.     Course c/b by RRT on 2/9:  RRT called on floor patient having SOB, increased work of breathing, and increased agitation. Patient is chronic alcoholic and is having DT's According to family patient has chronic binge drinking and found large quantity of empty Alchohol bottles at pt's home. Patient started on ativan and CIWA protocol today. Patient noted to have significantly worsened agitation and becoming hypoxic, started on Duoneb for suspected COPD/Asthma exacerbation. Patient was agitated pulling off venti mask and increased work of breathing sating 93% patient. CXR show pulmonary edema - transferred to CCU for further management.   Pt now improving respiratory wise after receiving IV diuresis for acute pulmonary edema, breathing comfortable on 3L NC. Being treated for alcohol withdrawal with DTs with IV ativan taper, s/p precedex gtt for agitation, on 1:1 for safety and improving. Now stable for downgrade to floors     Patient's mentation is the same as it was last week. Able to tell me the name and last name. Unable to recall where he is. Nursing strongly recommended, out of bed to chair.     REVIEW OF SYSTEMS:  - limited historian    PE:   T(C): 36.7 (20 Feb 2024 08:24), Max: 37 (19 Feb 2024 15:48)  T(F): 98.1 (20 Feb 2024 08:24), Max: 98.6 (19 Feb 2024 15:48)  HR: 66 (20 Feb 2024 08:24) (66 - 112)  BP: 122/67 (20 Feb 2024 08:24) (122/67 - 153/92)  RR: 18 (20 Feb 2024 08:24) (18 - 18)  SpO2: 97% (20 Feb 2024 08:24) (97% - 99%)  O2 Parameters below as of 20 Feb 2024 08:24  Patient On (Oxygen Delivery Method): room air  Constitutional: NAD, sleepy - easily arousable, able to mumble his name   HEENT: PERR, EOMI  Neck: Soft and supple,  No JVD  Respiratory: Breath sounds are clear bilaterally, No wheezing, rales or rhonchi  Cardiovascular: S1 and S2, regular rate and rhythm, no Murmurs  Gastrointestinal: abdomen is distended. no BM since 2/18  Extremities: No peripheral edema  Vascular: 2+ peripheral pulses  Neurological: A/O x 2, no focal deficits  Musculoskeletal: 5/5 strength b/l upper and lower extremities  Skin: No rashes      CBC Full  -  ( 20 Feb 2024 06:24 )  WBC Count : 8.83 K/uL  RBC Count : 3.50 M/uL  Hemoglobin : 12.4 g/dL  Hematocrit : 35.4 %  Platelet Count - Automated : 359 K/uL  Mean Cell Volume : 101.1 fl  Mean Cell Hemoglobin : 35.4 pg  Mean Cell Hemoglobin Concentration : 35.0 gm/dL  Auto Neutrophil # : x  Auto Lymphocyte # : x  Auto Monocyte # : x  Auto Eosinophil # : x  Auto Basophil # : x  Auto Neutrophil % : x  Auto Lymphocyte % : x  Auto Monocyte % : x  Auto Eosinophil % : x  Auto Basophil % : x      Urinalysis Basic - ( 20 Feb 2024 06:24 )    Color: x / Appearance: x / SG: x / pH: x  Gluc: 104 mg/dL / Ketone: x  / Bili: x / Urobili: x   Blood: x / Protein: x / Nitrite: x   Leuk Esterase: x / RBC: x / WBC x   Sq Epi: x / Non Sq Epi: x / Bacteria: x      02-20    139  |  110<H>  |  24<H>  ----------------------------<  104<H>  3.3<L>   |  25  |  0.73    Ca    8.6      20 Feb 2024 06:24  Mg     1.9     02-20      # Acute metabolic encephalopathy  # ETOH abuse with withdrawal c/b DTs. s/p prcedex  - limit delirogenic medications as much as possible  - would encourage out of bed to chair, frequent reorientation  - PT evaluation  - continue thiamine supplementation  - counseled on ETOH cessation with patient and his family at the bedside.    - neurology follow up appreciated.     # Atypical chest pain 2/2 left 11th rib fracture s/p suspected fall   - CT head negative   - CT chest with posterior left 11th rib fracture  - troponin neg x1  - EKG chronically abnormal, no significant change from previous EKGs  - Seen by cardiology Dr. De Guzman - no further workup planned  - TTE with preserved LVEF, mild-moderate valvular disease   - pain control     # Acute hypoxic respiratory failure 2/2 acute pulmonary edema  # Cough likely due to bronchitis   - s/p RRT on 2/9 as above  - Repeat CXR with increased vascular congestion   - s/p IV lasix , duonebs with improvement    - C/w supplemental O2, on 3L NC, wean as tolerated  - Cont inhalers   - Treated with 5 days azithromycin, zosyn for possible bronchitis     # HTN / HLD  - BP rising, resumed home lisinopril 40mg QD  - Increase Toprol to 50mg QD  - monitor and adjust meds PRN  - c/w statin    # DVT ppx  - Lovenox.

## 2024-02-20 NOTE — PROGRESS NOTE ADULT - ASSESSMENT
83 year old man with ETOH abuse, HTN, HLD, admitted on 2/7 with chest/rib pain in setting of chronic cough.  Treated this admission for ETOH withdrawal and hypoxic respiratory failure.  Has had clouded sensorium this admission.  ON exam, more alert today, and increased command following.  No acute findings on the CT at time of admission.  EEG without evidence of ictal or interictal activity.    Overall, most consistent with delirium in setting of ETOH withdrawal, medical illness, and medication effect.  Seroquel was stopped on 2/18.     -limit delirogenic medications as much as possible  -would encourage out of bed to chair, frequent reorientation  -PT evaluation  -continue thiamine supplementation  -counseled on ETOH cessation with patient and his family at the bedside.    -No further inpatient neuro recommendations anticipated at this point.  Please page or call neurology with any acute neuro changes, or other issues we can help address.

## 2024-02-20 NOTE — PROGRESS NOTE ADULT - SUBJECTIVE AND OBJECTIVE BOX
I saw and examined the patient at the bedside.   No acute events.  More alert today, but still disoriented.   Continuing to receive ativan for CIWA up to this morning, but on a taper schedule.   On exam:   GEN: NAD.    CV: RRR, S1, S2  PULM: CTAB  GI: soft, non tender  HEENT: no nuchal rigidity  NEURO:   Awake, and alert, attending the examiner.  He is oriented to year, not to hospital.  He names common objects, and follows bulbar and appendicular commands.  Palmar mental sign is present on the R.    Pupils 3-2mm, symmetric, full VF's, normal EOM, conjugate gaze, no nystagmus, no facial weakness, tongue midline, palate symmetric.   MOTOR: normal bulk and tone.  No drift.  5/5 throughout to confrontation upper and lower extremities.  No asterixis.    SENSORY: intact symmetrically to light touch.   COORDINATION: normal FNF    CTH images reviewed: no hemorrhage, no large territorial infarct.    _____________________________________________________________  **EEG SUMMARY/CLASSIFICATION**    Abnormal EEG in an encephalopathic patient.  - Background slowing, generalized.  - Irregular heart rate noted, incidentally.  __________________________________________________________  **EEG IMPRESSION/CLINICAL CORRELATE**    Abnormal EEG study.  - Mild to moderate nonspecific diffuse or multifocal cerebral dysfunction.   - Incidentally noted irregular heart rate at times. Correlate with 12-lead EKG and clinically.  - No epileptiform patterns or seizures recorded.  _____________________________________________________________

## 2024-02-20 NOTE — PROGRESS NOTE ADULT - SUBJECTIVE AND OBJECTIVE BOX
Subjective:    pat better, lying in bed, no new complaint.    Home Medications:  atorvastatin 20 mg oral tablet: 1 tab(s) orally once a day (08 Feb 2024 09:01)  cyanocobalamin 1000 mcg oral tablet: 1 tab(s) orally once a day (08 Feb 2024 08:59)  folic acid 0.8 mg oral tablet: 1 tab(s) orally once a day (08 Feb 2024 08:59)  lisinopril 40 mg oral tablet: 1 tab(s) orally once a day (08 Feb 2024 08:59)  metoprolol succinate 25 mg oral tablet, extended release: 1 tab(s) orally once a day (08 Feb 2024 09:01)  traZODone 100 mg oral tablet: 1 tab(s) orally once a day (at bedtime) (08 Feb 2024 08:58)    MEDICATIONS  (STANDING):  atorvastatin 20 milliGRAM(s) Oral at bedtime  budesonide 160 MICROgram(s)/formoterol 4.5 MICROgram(s) Inhaler 2 Puff(s) Inhalation two times a day  enoxaparin Injectable 40 milliGRAM(s) SubCutaneous every 24 hours  folic acid 1 milliGRAM(s) Oral daily  lisinopril 40 milliGRAM(s) Oral daily  metoprolol tartrate 25 milliGRAM(s) Oral two times a day  multivitamin 1 Tablet(s) Oral daily  traZODone 25 milliGRAM(s) Oral at bedtime    MEDICATIONS  (PRN):  acetaminophen     Tablet .. 650 milliGRAM(s) Oral every 6 hours PRN Temp greater or equal to 38C (100.4F), Mild Pain (1 - 3)  albuterol    90 MICROgram(s) HFA Inhaler 2 Puff(s) Inhalation every 6 hours PRN Shortness of Breath and/or Wheezing  aluminum hydroxide/magnesium hydroxide/simethicone Suspension 30 milliLiter(s) Oral every 4 hours PRN Dyspepsia  bisacodyl Suppository 10 milliGRAM(s) Rectal daily PRN Constipation  ondansetron Injectable 4 milliGRAM(s) IV Push once PRN Nausea and/or Vomiting  polyethylene glycol 3350 17 Gram(s) Oral at bedtime PRN Constipation      Allergies    No Known Allergies    Intolerances        Vital Signs Last 24 Hrs  T(C): 36.4 (20 Feb 2024 15:51), Max: 37 (20 Feb 2024 01:00)  T(F): 97.5 (20 Feb 2024 15:51), Max: 98.6 (20 Feb 2024 01:00)  HR: 61 (20 Feb 2024 15:51) (61 - 112)  BP: 105/60 (20 Feb 2024 15:51) (105/60 - 153/92)  BP(mean): 82 (20 Feb 2024 08:24) (82 - 82)  RR: 18 (20 Feb 2024 15:51) (18 - 18)  SpO2: 97% (20 Feb 2024 15:51) (97% - 99%)    Parameters below as of 20 Feb 2024 15:51  Patient On (Oxygen Delivery Method): room air          PHYSICAL EXAMINATION:    NECK:  Supple. No lymphadenopathy. Jugular venous pressure not elevated. Carotids equal.   HEART:   The cardiac impulse has a normal quality. Reg., Nl S1 and S2.  There are no murmurs, rubs or gallops noted  CHEST:  Chest rhonchi to auscultation. Normal respiratory effort.  ABDOMEN:  Soft and nontender.   EXTREMITIES:  There is no edema.       LABS:                        12.4   8.83  )-----------( 359      ( 20 Feb 2024 06:24 )             35.4     02-20    139  |  110<H>  |  24<H>  ----------------------------<  104<H>  3.3<L>   |  25  |  0.73    Ca    8.6      20 Feb 2024 06:24  Mg     1.9     02-20        Urinalysis Basic - ( 20 Feb 2024 06:24 )    Color: x / Appearance: x / SG: x / pH: x  Gluc: 104 mg/dL / Ketone: x  / Bili: x / Urobili: x   Blood: x / Protein: x / Nitrite: x   Leuk Esterase: x / RBC: x / WBC x   Sq Epi: x / Non Sq Epi: x / Bacteria: x

## 2024-02-20 NOTE — PROGRESS NOTE ADULT - ASSESSMENT
82 y/o M with PMHx of arrythmia, diverticulosis, HLD, HTN admitted c/o chest pain/Rib Pain and cough x1 week. Pt reports that the cough is getting worse and is now having a lot of pain on left side of his rib. Torso movement and deep breathing exacerbates pain. Pt suspects he hurt something during coughing fit. Denies vomiting, but endorses nausea. Pt not on o2. Denies any trauma, palpitations, syncop, abd pain or dyspnea. Patient found to have 11th rib fracture and initially admitted to medicine. RRT on 2/9: RRT called on floor patient having SOB, increased work of breathing, and increased agitation. Patient is chronic alcoholic and is having DT's According to family patient has chronic binge drinking and found large quantity of empty Alchohol bottles at pt's home. Patient started on ativan and CIWA protocol today. Patient noted to have significantly worsened agitation and becoming hypoxic, started on Duoneb for suspected COPD/Asthma exacerbation. Patient was agitated pulling off venti mask and increased work of breathing sating 93% patient. CXR show pulmonary edema - transferred to CCU for further management. Pt now improving respiratory wise after receiving IV diuresis for acute pulmonary edema, breathing comfortable on 3L NC. Being treated for alcohol withdrawal with DTs with IV ativan taper, s/p precedex gtt for agitation, on 1:1 for safety and improving. Now stable for downgrade to floors till tired and lethargic appearing but able to answer very simple commands. Denies any HA, CP, SOB. On 2 L of NC. Needs a incentive spirometer. I spoke with patient's son maria esther who states patient is heavy drinker ( many years now). Patient's family has noted cognitive decline, trouble with ambulation, pat seen for pulmonary eval, lathergy thought to be due to meds seroquel & ativan, family @ bedside, also h/o snoring & gaspimg for air during sleep.    PROBLEMS:    Acute hypoxic respiratory failure 2/2 acute pulmonary edema  Cough likely due to bronchitis    Acute metabolic encephalopathy  ETOH abuse with withdrawal c/b DTs  Atypical chest pain 2/2 left 11th rib fracture s/p suspected fall  AMADOR  HTN / HLD     PLAN:    pulmonary stable  CIWA-A protocol-taper  MV/folic acid/thiamine  Atypical chest pain 2/2 left 11th rib fracture s/p suspected fall-CT chest with posterior left 11th rib fracture-pain control   Acute hypoxic respiratory failure 2/2 acute pulmonary edema/Cough likely due to bronchitis-s/p IV lasix, duonebs with improvement    S/P Treated with 5 days azithromycin, zosyn for possible bronchitis   Sleep study as out-pat  DVT ppx-Lovenox.

## 2024-02-21 ENCOUNTER — TRANSCRIPTION ENCOUNTER (OUTPATIENT)
Age: 84
End: 2024-02-21

## 2024-02-21 VITALS
TEMPERATURE: 98 F | RESPIRATION RATE: 18 BRPM | HEART RATE: 84 BPM | OXYGEN SATURATION: 98 % | DIASTOLIC BLOOD PRESSURE: 67 MMHG | SYSTOLIC BLOOD PRESSURE: 139 MMHG

## 2024-02-21 LAB
ALBUMIN SERPL ELPH-MCNC: 3.3 G/DL — SIGNIFICANT CHANGE UP (ref 3.3–5)
ALP SERPL-CCNC: 102 U/L — SIGNIFICANT CHANGE UP (ref 40–120)
ALT FLD-CCNC: 76 U/L — SIGNIFICANT CHANGE UP (ref 12–78)
ANION GAP SERPL CALC-SCNC: 6 MMOL/L — SIGNIFICANT CHANGE UP (ref 5–17)
AST SERPL-CCNC: 31 U/L — SIGNIFICANT CHANGE UP (ref 15–37)
BILIRUB SERPL-MCNC: 0.6 MG/DL — SIGNIFICANT CHANGE UP (ref 0.2–1.2)
BUN SERPL-MCNC: 26 MG/DL — HIGH (ref 7–23)
CALCIUM SERPL-MCNC: 8.9 MG/DL — SIGNIFICANT CHANGE UP (ref 8.5–10.1)
CHLORIDE SERPL-SCNC: 112 MMOL/L — HIGH (ref 96–108)
CO2 SERPL-SCNC: 23 MMOL/L — SIGNIFICANT CHANGE UP (ref 22–31)
CREAT SERPL-MCNC: 0.73 MG/DL — SIGNIFICANT CHANGE UP (ref 0.5–1.3)
EGFR: 91 ML/MIN/1.73M2 — SIGNIFICANT CHANGE UP
GLUCOSE SERPL-MCNC: 104 MG/DL — HIGH (ref 70–99)
HCT VFR BLD CALC: 36.9 % — LOW (ref 39–50)
HGB BLD-MCNC: 12.8 G/DL — LOW (ref 13–17)
MCHC RBC-ENTMCNC: 34.7 GM/DL — SIGNIFICANT CHANGE UP (ref 32–36)
MCHC RBC-ENTMCNC: 35.4 PG — HIGH (ref 27–34)
MCV RBC AUTO: 101.9 FL — HIGH (ref 80–100)
PLATELET # BLD AUTO: 415 K/UL — HIGH (ref 150–400)
POTASSIUM SERPL-MCNC: 3.6 MMOL/L — SIGNIFICANT CHANGE UP (ref 3.5–5.3)
POTASSIUM SERPL-SCNC: 3.6 MMOL/L — SIGNIFICANT CHANGE UP (ref 3.5–5.3)
PROT SERPL-MCNC: 7 GM/DL — SIGNIFICANT CHANGE UP (ref 6–8.3)
RBC # BLD: 3.62 M/UL — LOW (ref 4.2–5.8)
RBC # FLD: 12.5 % — SIGNIFICANT CHANGE UP (ref 10.3–14.5)
SARS-COV-2 RNA SPEC QL NAA+PROBE: SIGNIFICANT CHANGE UP
SODIUM SERPL-SCNC: 141 MMOL/L — SIGNIFICANT CHANGE UP (ref 135–145)
WBC # BLD: 10.6 K/UL — HIGH (ref 3.8–10.5)
WBC # FLD AUTO: 10.6 K/UL — HIGH (ref 3.8–10.5)

## 2024-02-21 PROCEDURE — 99239 HOSP IP/OBS DSCHRG MGMT >30: CPT

## 2024-02-21 RX ORDER — POLYETHYLENE GLYCOL 3350 17 G/17G
17 POWDER, FOR SOLUTION ORAL
Qty: 0 | Refills: 0 | DISCHARGE
Start: 2024-02-21

## 2024-02-21 RX ADMIN — LISINOPRIL 40 MILLIGRAM(S): 2.5 TABLET ORAL at 09:19

## 2024-02-21 RX ADMIN — Medication 1 TABLET(S): at 09:20

## 2024-02-21 RX ADMIN — ENOXAPARIN SODIUM 40 MILLIGRAM(S): 100 INJECTION SUBCUTANEOUS at 17:48

## 2024-02-21 RX ADMIN — Medication 25 MILLIGRAM(S): at 09:19

## 2024-02-21 RX ADMIN — Medication 1 MILLIGRAM(S): at 09:19

## 2024-02-21 NOTE — DISCHARGE NOTE PROVIDER - NSDCCPCAREPLAN_GEN_ALL_CORE_FT
PRINCIPAL DISCHARGE DIAGNOSIS  Diagnosis: Rib fractures  Assessment and Plan of Treatment: You were admitted for chest pain, found to have 11th rib fracture.      SECONDARY DISCHARGE DIAGNOSES  Diagnosis: Alcohol dependence with withdrawal  Assessment and Plan of Treatment: Hospital course complicated by alcohol withdrawal with delirium tremens, was sedated in ICU. Now improved. Recommend alcohol cessation. Recommend AA meetings.    Diagnosis: H/O alcohol abuse  Assessment and Plan of Treatment:     Diagnosis: Unsteady gait  Assessment and Plan of Treatment:     Diagnosis: Coughing  Assessment and Plan of Treatment: Likley due to bronchitis. Now resolved

## 2024-02-21 NOTE — PROGRESS NOTE ADULT - REASON FOR ADMISSION
Left sided rib pain and cough

## 2024-02-21 NOTE — DISCHARGE NOTE PROVIDER - ATTENDING DISCHARGE PHYSICAL EXAMINATION:
Patient seen today, awake, alert, oriented to self.     Vital Signs Last 24 Hrs  T(C): 36.8 (21 Feb 2024 07:43), Max: 36.8 (21 Feb 2024 07:43)  T(F): 98.2 (21 Feb 2024 07:43), Max: 98.2 (21 Feb 2024 07:43)  HR: 84 (21 Feb 2024 07:43) (61 - 84)  BP: 139/67 (21 Feb 2024 07:43) (105/60 - 139/67)  RR: 18 (21 Feb 2024 07:43) (18 - 18)  SpO2: 98% (21 Feb 2024 07:43) (97% - 98%)    Parameters below as of 21 Feb 2024 07:43  Patient On (Oxygen Delivery Method): room air    PHYSICAL EXAM:  GENERAL: NAD, lying in bed comfortably  CHEST/LUNG: Clear to auscultation bilaterally; No rales, rhonchi, wheezing. Unlabored respirations  HEART: Regular rate and rhythm; No murmurs  ABDOMEN: Bowel sounds present; Soft, Nontender, Nondistended.   EXTREMITIES:  2+ Peripheral Pulses, brisk capillary refill. No clubbing, cyanosis, or edema  NERVOUS SYSTEM:  Alert & Oriented X3, speech clear. No deficits   MSK: FROM all 4 extremities, full and equal strength

## 2024-02-21 NOTE — DISCHARGE NOTE PROVIDER - NSDCFUSCHEDAPPT_GEN_ALL_CORE_FT
[facility]  [facility] [address]  Scheduled Appointment: 04/15/2024

## 2024-02-21 NOTE — DISCHARGE NOTE PROVIDER - HOSPITAL COURSE
82 y/o M with PMHx of arrythmia, diverticulosis, HLD, HTN presents to the ED c/o chest pain/Rib Pain and  cough x1 week. Pt reports that the cough is getting worse and is now having a lot of pain on left side of his rib. Torso movement and deep breathing exacerbates pain. Pt suspects he hurt something during coughing fit. Denies vomiting, but endorses nausea. Pt not on o2. Denies any trauma, palpitations, syncope, abd pain or dyspnea. Patient found to have 11th rib fracture and initially admitted to medicine.     Course c/b by RRT on 2/9:  RRT called on floor patient having SOB, increased work of breathing, and increased agitation. Patient is chronic alcoholic and is having DT's According to family patient has chronic binge drinking and found large quantity of empty Alchohol bottles at pt's home. Patient started on ativan and CIWA protocol today. Patient noted to have significantly worsened agitation and becoming hypoxic, started on Duoneb for suspected COPD/Asthma exacerbation. Patient was agitated pulling off venti mask and increased work of breathing sating 93% patient. CXR show pulmonary edema - transferred to CCU for further management.   Pt now improving respiratory wise after receiving IV diuresis for acute pulmonary edema, breathing comfortable on 3L NC. Being treated for alcohol withdrawal with DTs with IV ativan taper, s/p precedex gtt for agitation, on 1:1 for safety and improving. Now stable for downgrade to floors       # Acute metabolic encephalopathy  # ETOH abuse with withdrawal c/b DTs. s/p precedex  - limit delirogenic medications as much as possible  - would encourage out of bed to chair, frequent reorientation  - PT evaluation  - continue thiamine supplementation  - was counseled on ETOH cessation with patient and his family at the bedside.    - neurology follow up appreciated.     # Atypical chest pain 2/2 left 11th rib fracture s/p suspected fall   - CT head negative   - CT chest with posterior left 11th rib fracture  - troponin neg x1  - EKG chronically abnormal, no significant change from previous EKGs  - Seen by cardiology Dr. De Guzman - no further workup planned  - TTE with preserved LVEF, mild-moderate valvular disease   - pain control     # Acute hypoxic respiratory failure 2/2 acute pulmonary edema  # Cough likely due to bronchitis   - s/p RRT on 2/9 as above  - Repeat CXR with increased vascular congestion   - s/p IV lasix , duonebs with improvement    - C/w supplemental O2, on 3L NC, wean as tolerated  - Cont inhalers   - Treated with 5 days azithromycin, zosyn for possible bronchitis     # HTN / HLD  - BP rising, resumed home lisinopril 40mg QD  - Increase Toprol to 50mg QD  - monitor and adjust meds PRN  - c/w statin    # DVT ppx  - Lovenox.

## 2024-02-21 NOTE — PROGRESS NOTE ADULT - ASSESSMENT
84 y/o M with PMHx of arrythmia, diverticulosis, HLD, HTN admitted c/o chest pain/Rib Pain and cough x1 week. Pt reports that the cough is getting worse and is now having a lot of pain on left side of his rib. Torso movement and deep breathing exacerbates pain. Pt suspects he hurt something during coughing fit. Denies vomiting, but endorses nausea. Pt not on o2. Denies any trauma, palpitations, syncop, abd pain or dyspnea. Patient found to have 11th rib fracture and initially admitted to medicine. RRT on 2/9: RRT called on floor patient having SOB, increased work of breathing, and increased agitation. Patient is chronic alcoholic and is having DT's According to family patient has chronic binge drinking and found large quantity of empty Alchohol bottles at pt's home. Patient started on ativan and CIWA protocol today. Patient noted to have significantly worsened agitation and becoming hypoxic, started on Duoneb for suspected COPD/Asthma exacerbation. Patient was agitated pulling off venti mask and increased work of breathing sating 93% patient. CXR show pulmonary edema - transferred to CCU for further management. Pt now improving respiratory wise after receiving IV diuresis for acute pulmonary edema, breathing comfortable on 3L NC. Being treated for alcohol withdrawal with DTs with IV ativan taper, s/p precedex gtt for agitation, on 1:1 for safety and improving. Now stable for downgrade to floors till tired and lethargic appearing but able to answer very simple commands. Denies any HA, CP, SOB. On 2 L of NC. Needs a incentive spirometer. I spoke with patient's son maria esther who states patient is heavy drinker ( many years now). Patient's family has noted cognitive decline, trouble with ambulation, pat seen for pulmonary eval, lathergy thought to be due to meds seroquel & ativan, family @ bedside, also h/o snoring & gaspimg for air during sleep.    PROBLEMS:    Acute hypoxic respiratory failure 2/2 acute pulmonary edema  Cough likely due to bronchitis    Acute metabolic encephalopathy  ETOH abuse with withdrawal c/b DTs  Atypical chest pain 2/2 left 11th rib fracture s/p suspected fall  AMADOR  HTN / HLD     PLAN:    pulmonary stable  CIWA-A protocol-taper  MV/folic acid/thiamine  Atypical chest pain 2/2 left 11th rib fracture s/p suspected fall-CT chest with posterior left 11th rib fracture-pain control   Acute hypoxic respiratory failure 2/2 acute pulmonary edema/Cough likely due to bronchitis-s/p IV lasix, duonebs with improvement    S/P Treated with 5 days azithromycin, zosyn for possible bronchitis   Sleep study as out-pat-d/w wife  DVT ppx-Lovenox.

## 2024-02-21 NOTE — DISCHARGE NOTE PROVIDER - NSDCMRMEDTOKEN_GEN_ALL_CORE_FT
atorvastatin 20 mg oral tablet: 1 tab(s) orally once a day  bisacodyl 10 mg rectal suppository: 1 suppository(ies) rectal once a day As needed Constipation  cyanocobalamin 1000 mcg oral tablet: 1 tab(s) orally once a day  folic acid 0.8 mg oral tablet: 1 tab(s) orally once a day  lisinopril 40 mg oral tablet: 1 tab(s) orally once a day  metoprolol succinate 25 mg oral tablet, extended release: 1 tab(s) orally once a day  Multiple Vitamins oral tablet: 1 tab(s) orally once a day  polyethylene glycol 3350 oral powder for reconstitution: 17 gram(s) orally once a day (at bedtime) As needed Constipation  traZODone 100 mg oral tablet: 1 tab(s) orally once a day (at bedtime)

## 2024-02-21 NOTE — DISCHARGE NOTE NURSING/CASE MANAGEMENT/SOCIAL WORK - PATIENT PORTAL LINK FT
You can access the FollowMyHealth Patient Portal offered by [facility] by registering at the following website: [email or web address]. By joining [employer]’s FollowMyHealth portal, you will also be able to view your health information using other applications (apps) compatible with our system.

## 2024-02-21 NOTE — PROGRESS NOTE ADULT - SUBJECTIVE AND OBJECTIVE BOX
Subjective:    pat better, awake & alert, lying in bed, no new complaint.    Home Medications:  atorvastatin 20 mg oral tablet: 1 tab(s) orally once a day (08 Feb 2024 09:01)  bisacodyl 10 mg rectal suppository: 1 suppository(ies) rectal once a day As needed Constipation (21 Feb 2024 14:18)  cyanocobalamin 1000 mcg oral tablet: 1 tab(s) orally once a day (08 Feb 2024 08:59)  folic acid 0.8 mg oral tablet: 1 tab(s) orally once a day (08 Feb 2024 08:59)  lisinopril 40 mg oral tablet: 1 tab(s) orally once a day (08 Feb 2024 08:59)  metoprolol succinate 25 mg oral tablet, extended release: 1 tab(s) orally once a day (08 Feb 2024 09:01)  Multiple Vitamins oral tablet: 1 tab(s) orally once a day (21 Feb 2024 14:18)  polyethylene glycol 3350 oral powder for reconstitution: 17 gram(s) orally once a day (at bedtime) As needed Constipation (21 Feb 2024 14:18)  traZODone 100 mg oral tablet: 1 tab(s) orally once a day (at bedtime) (08 Feb 2024 08:58)    MEDICATIONS  (STANDING):  atorvastatin 20 milliGRAM(s) Oral at bedtime  budesonide 160 MICROgram(s)/formoterol 4.5 MICROgram(s) Inhaler 2 Puff(s) Inhalation two times a day  enoxaparin Injectable 40 milliGRAM(s) SubCutaneous every 24 hours  folic acid 1 milliGRAM(s) Oral daily  lisinopril 40 milliGRAM(s) Oral daily  metoprolol tartrate 25 milliGRAM(s) Oral two times a day  multivitamin 1 Tablet(s) Oral daily  traZODone 25 milliGRAM(s) Oral at bedtime    MEDICATIONS  (PRN):  acetaminophen     Tablet .. 650 milliGRAM(s) Oral every 6 hours PRN Temp greater or equal to 38C (100.4F), Mild Pain (1 - 3)  albuterol    90 MICROgram(s) HFA Inhaler 2 Puff(s) Inhalation every 6 hours PRN Shortness of Breath and/or Wheezing  aluminum hydroxide/magnesium hydroxide/simethicone Suspension 30 milliLiter(s) Oral every 4 hours PRN Dyspepsia  bisacodyl Suppository 10 milliGRAM(s) Rectal daily PRN Constipation  ondansetron Injectable 4 milliGRAM(s) IV Push once PRN Nausea and/or Vomiting  polyethylene glycol 3350 17 Gram(s) Oral at bedtime PRN Constipation      Allergies    No Known Allergies    Intolerances        Vital Signs Last 24 Hrs  T(C): 36.8 (21 Feb 2024 07:43), Max: 36.8 (21 Feb 2024 07:43)  T(F): 98.2 (21 Feb 2024 07:43), Max: 98.2 (21 Feb 2024 07:43)  HR: 84 (21 Feb 2024 07:43) (83 - 84)  BP: 139/67 (21 Feb 2024 07:43) (136/65 - 139/67)  BP(mean): --  RR: 18 (21 Feb 2024 07:43) (18 - 18)  SpO2: 98% (21 Feb 2024 07:43) (98% - 98%)    Parameters below as of 21 Feb 2024 07:43  Patient On (Oxygen Delivery Method): room air          PHYSICAL EXAMINATION:    NECK:  Supple. No lymphadenopathy. Jugular venous pressure not elevated. Carotids equal.   HEART:   The cardiac impulse has a normal quality. Reg., Nl S1 and S2.  There are no murmurs, rubs or gallops noted  CHEST:  Chest is clear to auscultation. Normal respiratory effort.  ABDOMEN:  Soft and nontender.   EXTREMITIES:  There is no edema.       LABS:                        12.8   10.60 )-----------( 415      ( 21 Feb 2024 07:16 )             36.9     02-21    141  |  112<H>  |  26<H>  ----------------------------<  104<H>  3.6   |  23  |  0.73    Ca    8.9      21 Feb 2024 07:16  Mg     1.9     02-20    TPro  7.0  /  Alb  3.3  /  TBili  0.6  /  DBili  x   /  AST  31  /  ALT  76  /  AlkPhos  102  02-21      Urinalysis Basic - ( 21 Feb 2024 07:16 )    Color: x / Appearance: x / SG: x / pH: x  Gluc: 104 mg/dL / Ketone: x  / Bili: x / Urobili: x   Blood: x / Protein: x / Nitrite: x   Leuk Esterase: x / RBC: x / WBC x   Sq Epi: x / Non Sq Epi: x / Bacteria: x

## 2024-02-21 NOTE — PROGRESS NOTE ADULT - PROVIDER SPECIALTY LIST ADULT
CCU
Hospitalist
Pulmonology
Critical Care
Hospitalist
Hospitalist
Pulmonology
Critical Care
Hospitalist
Neurology
Pulmonology
Pulmonology
Critical Care

## 2024-02-21 NOTE — DISCHARGE NOTE NURSING/CASE MANAGEMENT/SOCIAL WORK - NSDCPEFALRISK_GEN_ALL_CORE
For information on Fall & Injury Prevention, visit: [email or web address] OR  [email or web address] OR  https://www.cdc.gov/steadi/patient.html

## 2024-02-21 NOTE — DISCHARGE NOTE PROVIDER - CARE PROVIDER_API CALL
Gordon Corral  [facility]  [address]  [place] [address]  Phone: [phone]  Fax: [phone]  Follow Up Time:

## 2024-04-06 DIAGNOSIS — R97.20 ELEVATED PROSTATE, SPECIFIC ANTIGEN [PSA]: ICD-10-CM

## 2024-04-08 ENCOUNTER — APPOINTMENT (OUTPATIENT)
Dept: FAMILY MEDICINE | Facility: CLINIC | Age: 84
End: 2024-04-08
Payer: MEDICARE

## 2024-04-08 VITALS
BODY MASS INDEX: 28.41 KG/M2 | OXYGEN SATURATION: 96 % | TEMPERATURE: 97.8 F | HEART RATE: 88 BPM | HEIGHT: 67 IN | SYSTOLIC BLOOD PRESSURE: 132 MMHG | WEIGHT: 181 LBS | DIASTOLIC BLOOD PRESSURE: 72 MMHG

## 2024-04-08 DIAGNOSIS — E78.00 PURE HYPERCHOLESTEROLEMIA, UNSPECIFIED: ICD-10-CM

## 2024-04-08 DIAGNOSIS — G47.00 INSOMNIA, UNSPECIFIED: ICD-10-CM

## 2024-04-08 DIAGNOSIS — I10 ESSENTIAL (PRIMARY) HYPERTENSION: ICD-10-CM

## 2024-04-08 DIAGNOSIS — F10.90 ALCOHOL USE, UNSPECIFIED, UNCOMPLICATED: ICD-10-CM

## 2024-04-08 DIAGNOSIS — D75.89 OTHER SPECIFIED DISEASES OF BLOOD AND BLOOD-FORMING ORGANS: ICD-10-CM

## 2024-04-08 PROCEDURE — 99214 OFFICE O/P EST MOD 30 MIN: CPT

## 2024-04-08 PROCEDURE — 36415 COLL VENOUS BLD VENIPUNCTURE: CPT

## 2024-04-08 RX ORDER — ZOLPIDEM TARTRATE 5 MG/1
5 TABLET ORAL
Qty: 30 | Refills: 0 | Status: COMPLETED | COMMUNITY
Start: 2024-01-11 | End: 2024-04-08

## 2024-04-08 NOTE — ASSESSMENT
[FreeTextEntry1] : He has a history of hypertension, hypercholesterolemia, and insomnia. He is here to follow up after a hospitalization and rehab stay for a rib fracture complicated by acute alcohol withdrawal.   Alcohol use disorder is a new diagnosis and explains his elevated MCV on his labs from his physical in 1/2024. He states that he was only drinking 2-3 glasses of wine at night but he admits that he was having 2-3 glasses of cognac at night as well. He has since stopped drinking entirely. He is taking Trazodone but still not sleeping well. He was taking 200 mg of Trazodone at home but only 150 mg at rehab.   He is getting home PT once a week and is doing home exercises as well. He is using a 4 point cane but still feels unsteady on his feet.  He saw cardiology last year and had an Echo and a nuclear stress test which were both normal.  He had an elevated PSA at his physical and wanted to repeat this in 3 months.  cold symptoms

## 2024-04-08 NOTE — HEALTH RISK ASSESSMENT
[No falls in past year] : Patient reported no falls in the past year [0] : 2) Feeling down, depressed, or hopeless: Not at all (0) [PHQ-2 Negative - No further assessment needed] : PHQ-2 Negative - No further assessment needed [Former] : Former [5-9] : 5-9 [> 15 Years] : > 15 Years [[id number]] : 0

## 2024-04-08 NOTE — HISTORY OF PRESENT ILLNESS
[Spouse] : spouse [FreeTextEntry1] : OSKAR IBARRA is a 83 year old male here for a follow up visit. [de-identified] : He is here for hospital follow up.  Date of admission: 2/8/24 Date of discharge: 2/21/24 Reason for Admission: Left sided rib pain and cough  Patient presented to the ER with one week of left sided chest/rib pain and cough. He was diagnosed with a fracture of the 11th rib and admitted to the hospital on 2/8. On 2/9 he was short of breath and agitated and was felt to be having DTs. His family found a large number of empty alcohol bottles in his house. He was started on Ativan for alcohol withdrawal.   He was also diagnosed with acute pulmonary edema in the CCU. He received IV diuretics and his breathing improved. He had a workup for altered mental status. His CT head was negative. And he was felt to have metabolic encephalopathy related to alcohol withdrawal. After hospitalization for nearly 2 weeks he was discharge to rehab.   He returned home about 3 weeks ago and has been receiving home care visits. He had a temperature of 100.4 on 3/14 and the visiting nurse called for recommendations. Since he had no other symptoms she was advised to check his temperature again and bring him to the office if it was higher.

## 2024-04-08 NOTE — PLAN
[FreeTextEntry1] : Continue all medications as prescribed. Check labs as above. Will adjust any medications based upon lab results.  He will continue to work with PT and exercise on his own. His wife requests a temporary handicapped parking permit since it is difficult for him to walk long distances. I filled out a permit for 6 months.  Reviewed age-appropriate preventive screening tests with patient.  Discussed clean eating (eg Mediterranean style eating plan) and regular exercise/staying as physically active as possible.  Include balance exercises and strength training and core strengthening exercises for bone health and to decrease risk for falls.  Reviewed importance of good self care (e.g. meditation, yoga, adequate rest, regular exercise, magnesium, clean eating, etc.).  Follow up for next physical in 1/2025 or sooner based upon lab results.

## 2024-04-09 ENCOUNTER — TRANSCRIPTION ENCOUNTER (OUTPATIENT)
Age: 84
End: 2024-04-09

## 2024-04-09 LAB
ALBUMIN SERPL ELPH-MCNC: 4.2 G/DL
ALP BLD-CCNC: 87 U/L
ALT SERPL-CCNC: 31 U/L
ANION GAP SERPL CALC-SCNC: 13 MMOL/L
AST SERPL-CCNC: 23 U/L
BASOPHILS # BLD AUTO: 0.05 K/UL
BASOPHILS NFR BLD AUTO: 1 %
BILIRUB SERPL-MCNC: 0.3 MG/DL
BUN SERPL-MCNC: 11 MG/DL
CALCIUM SERPL-MCNC: 9.2 MG/DL
CHLORIDE SERPL-SCNC: 104 MMOL/L
CHOLEST SERPL-MCNC: 135 MG/DL
CO2 SERPL-SCNC: 21 MMOL/L
CREAT SERPL-MCNC: 0.76 MG/DL
EGFR: 90 ML/MIN/1.73M2
EOSINOPHIL # BLD AUTO: 0.15 K/UL
EOSINOPHIL NFR BLD AUTO: 3.1 %
FOLATE SERPL-MCNC: >20 NG/ML
GLUCOSE SERPL-MCNC: 114 MG/DL
HCT VFR BLD CALC: 37.4 %
HDLC SERPL-MCNC: 50 MG/DL
HGB BLD-MCNC: 12.5 G/DL
IMM GRANULOCYTES NFR BLD AUTO: 0.4 %
LDLC SERPL CALC-MCNC: 66 MG/DL
LYMPHOCYTES # BLD AUTO: 1.46 K/UL
LYMPHOCYTES NFR BLD AUTO: 29.9 %
MAN DIFF?: NORMAL
MCHC RBC-ENTMCNC: 33.4 GM/DL
MCHC RBC-ENTMCNC: 34.1 PG
MCV RBC AUTO: 101.9 FL
MONOCYTES # BLD AUTO: 0.46 K/UL
MONOCYTES NFR BLD AUTO: 9.4 %
NEUTROPHILS # BLD AUTO: 2.74 K/UL
NEUTROPHILS NFR BLD AUTO: 56.2 %
NONHDLC SERPL-MCNC: 85 MG/DL
PLATELET # BLD AUTO: 317 K/UL
POTASSIUM SERPL-SCNC: 4.5 MMOL/L
PROT SERPL-MCNC: 6.5 G/DL
PSA SERPL-MCNC: 3.79 NG/ML
RBC # BLD: 3.67 M/UL
RBC # FLD: 12.9 %
SODIUM SERPL-SCNC: 138 MMOL/L
TRIGL SERPL-MCNC: 106 MG/DL
VIT B12 SERPL-MCNC: >2000 PG/ML
WBC # FLD AUTO: 4.88 K/UL

## 2024-04-15 ENCOUNTER — APPOINTMENT (OUTPATIENT)
Dept: FAMILY MEDICINE | Facility: CLINIC | Age: 84
End: 2024-04-15

## 2024-07-08 ENCOUNTER — APPOINTMENT (OUTPATIENT)
Dept: FAMILY MEDICINE | Facility: CLINIC | Age: 84
End: 2024-07-08
Payer: MEDICARE

## 2024-07-08 VITALS
DIASTOLIC BLOOD PRESSURE: 62 MMHG | OXYGEN SATURATION: 97 % | SYSTOLIC BLOOD PRESSURE: 130 MMHG | WEIGHT: 170 LBS | TEMPERATURE: 97.2 F | BODY MASS INDEX: 26.68 KG/M2 | HEIGHT: 67 IN | HEART RATE: 95 BPM

## 2024-07-08 DIAGNOSIS — E78.00 PURE HYPERCHOLESTEROLEMIA, UNSPECIFIED: ICD-10-CM

## 2024-07-08 DIAGNOSIS — F10.90 ALCOHOL USE, UNSPECIFIED, UNCOMPLICATED: ICD-10-CM

## 2024-07-08 DIAGNOSIS — G47.00 INSOMNIA, UNSPECIFIED: ICD-10-CM

## 2024-07-08 DIAGNOSIS — I10 ESSENTIAL (PRIMARY) HYPERTENSION: ICD-10-CM

## 2024-07-08 DIAGNOSIS — L82.0 INFLAMED SEBORRHEIC KERATOSIS: ICD-10-CM

## 2024-07-08 PROCEDURE — 36415 COLL VENOUS BLD VENIPUNCTURE: CPT

## 2024-07-08 PROCEDURE — 99214 OFFICE O/P EST MOD 30 MIN: CPT

## 2024-07-09 ENCOUNTER — TRANSCRIPTION ENCOUNTER (OUTPATIENT)
Age: 84
End: 2024-07-09

## 2024-12-09 ENCOUNTER — APPOINTMENT (OUTPATIENT)
Dept: FAMILY MEDICINE | Facility: CLINIC | Age: 84
End: 2024-12-09
Payer: MEDICARE

## 2024-12-09 VITALS
HEIGHT: 67 IN | RESPIRATION RATE: 18 BRPM | HEART RATE: 71 BPM | OXYGEN SATURATION: 96 % | WEIGHT: 170 LBS | BODY MASS INDEX: 26.68 KG/M2 | TEMPERATURE: 97.2 F | DIASTOLIC BLOOD PRESSURE: 68 MMHG | SYSTOLIC BLOOD PRESSURE: 122 MMHG

## 2024-12-09 DIAGNOSIS — J18.9 PNEUMONIA, UNSPECIFIED ORGANISM: ICD-10-CM

## 2024-12-09 PROCEDURE — 99213 OFFICE O/P EST LOW 20 MIN: CPT

## 2024-12-09 RX ORDER — AMOXICILLIN AND CLAVULANATE POTASSIUM 875; 125 MG/1; MG/1
875-125 TABLET, COATED ORAL
Qty: 14 | Refills: 0 | Status: ACTIVE | COMMUNITY
Start: 2024-12-09 | End: 1900-01-01

## 2024-12-09 RX ORDER — AZITHROMYCIN 250 MG/1
250 TABLET, FILM COATED ORAL
Qty: 1 | Refills: 0 | Status: COMPLETED | COMMUNITY
Start: 2024-12-09 | End: 2024-12-14

## 2025-01-11 ENCOUNTER — RESULT CHARGE (OUTPATIENT)
Age: 85
End: 2025-01-11

## 2025-01-16 PROBLEM — R73.01 IMPAIRED FASTING GLUCOSE: Status: ACTIVE | Noted: 2021-05-24

## 2025-01-17 ENCOUNTER — NON-APPOINTMENT (OUTPATIENT)
Age: 85
End: 2025-01-17

## 2025-01-17 ENCOUNTER — APPOINTMENT (OUTPATIENT)
Dept: FAMILY MEDICINE | Facility: CLINIC | Age: 85
End: 2025-01-17
Payer: MEDICARE

## 2025-01-17 VITALS
BODY MASS INDEX: 27.62 KG/M2 | WEIGHT: 176 LBS | HEIGHT: 67 IN | OXYGEN SATURATION: 97 % | DIASTOLIC BLOOD PRESSURE: 60 MMHG | TEMPERATURE: 97.3 F | SYSTOLIC BLOOD PRESSURE: 128 MMHG | HEART RATE: 86 BPM

## 2025-01-17 DIAGNOSIS — Z00.00 ENCOUNTER FOR GENERAL ADULT MEDICAL EXAMINATION W/OUT ABNORMAL FINDINGS: ICD-10-CM

## 2025-01-17 DIAGNOSIS — E78.00 PURE HYPERCHOLESTEROLEMIA, UNSPECIFIED: ICD-10-CM

## 2025-01-17 DIAGNOSIS — I10 ESSENTIAL (PRIMARY) HYPERTENSION: ICD-10-CM

## 2025-01-17 DIAGNOSIS — G47.00 INSOMNIA, UNSPECIFIED: ICD-10-CM

## 2025-01-17 DIAGNOSIS — R73.01 IMPAIRED FASTING GLUCOSE: ICD-10-CM

## 2025-01-17 PROCEDURE — 36415 COLL VENOUS BLD VENIPUNCTURE: CPT

## 2025-01-17 PROCEDURE — 93000 ELECTROCARDIOGRAM COMPLETE: CPT

## 2025-01-17 PROCEDURE — G0439: CPT

## 2025-01-18 LAB
ALBUMIN SERPL ELPH-MCNC: 4.4 G/DL
ALP BLD-CCNC: 58 U/L
ALT SERPL-CCNC: 25 U/L
ANION GAP SERPL CALC-SCNC: 13 MMOL/L
AST SERPL-CCNC: 19 U/L
BASOPHILS # BLD AUTO: 0.04 K/UL
BASOPHILS NFR BLD AUTO: 0.6 %
BILIRUB SERPL-MCNC: 0.7 MG/DL
BUN SERPL-MCNC: 16 MG/DL
CALCIUM SERPL-MCNC: 9.6 MG/DL
CHLORIDE SERPL-SCNC: 102 MMOL/L
CHOLEST SERPL-MCNC: 141 MG/DL
CO2 SERPL-SCNC: 22 MMOL/L
CREAT SERPL-MCNC: 0.79 MG/DL
EGFR: 88 ML/MIN/1.73M2
EOSINOPHIL # BLD AUTO: 0.28 K/UL
EOSINOPHIL NFR BLD AUTO: 4.3 %
ESTIMATED AVERAGE GLUCOSE: 108 MG/DL
GLUCOSE SERPL-MCNC: 108 MG/DL
HBA1C MFR BLD HPLC: 5.4 %
HCT VFR BLD CALC: 43.6 %
HDLC SERPL-MCNC: 55 MG/DL
HGB BLD-MCNC: 15.1 G/DL
IMM GRANULOCYTES NFR BLD AUTO: 0.3 %
LDLC SERPL CALC-MCNC: 69 MG/DL
LYMPHOCYTES # BLD AUTO: 1.78 K/UL
LYMPHOCYTES NFR BLD AUTO: 27.2 %
MAN DIFF?: NORMAL
MCHC RBC-ENTMCNC: 34 PG
MCHC RBC-ENTMCNC: 34.6 G/DL
MCV RBC AUTO: 98.2 FL
MONOCYTES # BLD AUTO: 0.58 K/UL
MONOCYTES NFR BLD AUTO: 8.9 %
NEUTROPHILS # BLD AUTO: 3.84 K/UL
NEUTROPHILS NFR BLD AUTO: 58.7 %
NONHDLC SERPL-MCNC: 86 MG/DL
PLATELET # BLD AUTO: 288 K/UL
POTASSIUM SERPL-SCNC: 4.1 MMOL/L
PROT SERPL-MCNC: 6.9 G/DL
PSA SERPL-MCNC: 4.58 NG/ML
RBC # BLD: 4.44 M/UL
RBC # FLD: 13.2 %
SODIUM SERPL-SCNC: 138 MMOL/L
TRIGL SERPL-MCNC: 87 MG/DL
WBC # FLD AUTO: 6.54 K/UL

## 2025-01-20 ENCOUNTER — TRANSCRIPTION ENCOUNTER (OUTPATIENT)
Age: 85
End: 2025-01-20

## 2025-01-30 ENCOUNTER — RX RENEWAL (OUTPATIENT)
Age: 85
End: 2025-01-30

## 2025-06-02 ENCOUNTER — RX RENEWAL (OUTPATIENT)
Age: 85
End: 2025-06-02